# Patient Record
Sex: FEMALE | Race: WHITE | Employment: FULL TIME | URBAN - METROPOLITAN AREA
[De-identification: names, ages, dates, MRNs, and addresses within clinical notes are randomized per-mention and may not be internally consistent; named-entity substitution may affect disease eponyms.]

---

## 2017-10-04 ENCOUNTER — HOSPITAL ENCOUNTER (OUTPATIENT)
Age: 42
Setting detail: SPECIMEN
Discharge: HOME | End: 2017-10-04
Payer: COMMERCIAL

## 2017-10-04 DIAGNOSIS — R20.2 PARESTHESIA OF LEFT ARM: ICD-10-CM

## 2017-10-04 DIAGNOSIS — R20.2 PARESTHESIA OF LEFT FOOT: ICD-10-CM

## 2017-10-04 LAB
ABSOLUTE EOS #: 0.1 K/UL (ref 0–0.4)
ABSOLUTE LYMPH #: 2.5 K/UL (ref 1–4.8)
ABSOLUTE MONO #: 0.7 K/UL (ref 0.1–1.2)
BASOPHILS # BLD: 0 %
BASOPHILS ABSOLUTE: 0 K/UL (ref 0–0.2)
DIFFERENTIAL TYPE: NORMAL
EOSINOPHILS RELATIVE PERCENT: 2 %
HCT VFR BLD CALC: 41 % (ref 36–46)
HEMOGLOBIN: 13.7 G/DL (ref 12–16)
LYMPHOCYTES # BLD: 37 %
MCH RBC QN AUTO: 27.6 PG (ref 26–34)
MCHC RBC AUTO-ENTMCNC: 33.3 G/DL (ref 31–37)
MCV RBC AUTO: 82.8 FL (ref 80–100)
MONOCYTES # BLD: 11 %
PDW BLD-RTO: 14.1 % (ref 12.5–15.4)
PLATELET # BLD: 231 K/UL (ref 140–450)
PLATELET ESTIMATE: NORMAL
PMV BLD AUTO: 8.9 FL (ref 6–12)
RBC # BLD: 4.95 M/UL (ref 4–5.2)
RBC # BLD: NORMAL 10*6/UL
SEG NEUTROPHILS: 50 %
SEGMENTED NEUTROPHILS ABSOLUTE COUNT: 3.4 K/UL (ref 1.8–7.7)
THYROXINE, FREE: 1.41 NG/DL (ref 0.93–1.7)
TSH SERPL DL<=0.05 MIU/L-ACNC: 2.03 MIU/L (ref 0.3–5)
VITAMIN B-12: 554 PG/ML (ref 211–946)
WBC # BLD: 6.7 K/UL (ref 3.5–11)
WBC # BLD: NORMAL 10*3/UL

## 2017-10-12 ENCOUNTER — HOSPITAL ENCOUNTER (OUTPATIENT)
Dept: GENERAL RADIOLOGY | Facility: CLINIC | Age: 42
Discharge: HOME | End: 2017-10-12
Payer: COMMERCIAL

## 2017-10-12 DIAGNOSIS — R20.2 PARESTHESIA OF LEFT ARM: ICD-10-CM

## 2017-10-12 PROCEDURE — 72040 X-RAY EXAM NECK SPINE 2-3 VW: CPT

## 2017-11-08 ENCOUNTER — OFFICE VISIT (OUTPATIENT)
Dept: NEUROLOGY | Age: 42
End: 2017-11-08
Payer: COMMERCIAL

## 2017-11-08 VITALS
HEART RATE: 83 BPM | SYSTOLIC BLOOD PRESSURE: 125 MMHG | DIASTOLIC BLOOD PRESSURE: 83 MMHG | BODY MASS INDEX: 55.32 KG/M2 | HEIGHT: 61 IN | WEIGHT: 293 LBS

## 2017-11-08 DIAGNOSIS — M54.12 CERVICAL RADICULOPATHY AT C6: Primary | ICD-10-CM

## 2017-11-08 PROBLEM — R35.0 FREQUENCY OF URINATION: Status: ACTIVE | Noted: 2017-11-08

## 2017-11-08 PROBLEM — R39.15 URGENCY OF URINATION: Status: ACTIVE | Noted: 2017-11-08

## 2017-11-08 PROCEDURE — 99203 OFFICE O/P NEW LOW 30 MIN: CPT | Performed by: NURSE PRACTITIONER

## 2017-11-08 NOTE — PROGRESS NOTES
A1c 5.0, cholesterol 125, HDL 33, LDL 74, and triglycerides 91. Past Medical History:   Diagnosis Date    Allergic rhinitis     Asthma     GERD (gastroesophageal reflux disease)     Hypertension     Insulin resistance 9/9/2014    Iron deficiency anemia, unspecified        Past Surgical History:   Procedure Laterality Date    TONSILLECTOMY         Family History   Problem Relation Age of Onset    Cancer Mother     High Blood Pressure Mother     High Blood Pressure Father     High Blood Pressure Brother     Heart Disease Maternal Grandmother        Social History     Social History    Marital status: Single     Spouse name: N/A    Number of children: N/A    Years of education: N/A     Social History Main Topics    Smoking status: Never Smoker    Smokeless tobacco: Not on file    Alcohol use No    Drug use: Unknown    Sexual activity: Not on file     Other Topics Concern    Not on file     Social History Narrative    No narrative on file       Current Outpatient Prescriptions   Medication Sig Dispense Refill    pioglitazone (ACTOS) 45 MG tablet TAKE 1 TABLET BY MOUTH ONE TIME A DAY 30 tablet 5    lisinopril (PRINIVIL;ZESTRIL) 20 MG tablet TAKE 1 TABLET BY MOUTH ONE TIME A DAY 30 tablet 5    ranitidine (ZANTAC) 150 MG tablet Take 1 tablet by mouth 2 times daily 60 tablet 5    cetirizine (ZYRTEC) 10 MG tablet TAKE 1 TABLET BY MOUTH ONE TIME A DAY 30 tablet 5    nystatin (MYCOSTATIN) 025883 UNIT/GM powder Apply 3 times daily.  60 g 0     Current Facility-Administered Medications   Medication Dose Route Frequency Provider Last Rate Last Dose    methylPREDNISolone acetate (DEPO-MEDROL) injection 80 mg  80 mg Intramuscular Once Heather Alicea, DO           Allergies   Allergen Reactions    Dicloxacillin     Metformin And Related Diarrhea            REVIEW OF SYSTEMS    CONSTITUTIONAL Weight: absent, Appetite: absent, Fatigue: present      HEENT Ears: ringing, Eyes: glasses, Visual Examination    Neurologic Exam     Mental Status   Oriented to person, place, and time. Attention: normal.   Speech: speech is normal   Level of consciousness: alert  Normal comprehension. Cranial Nerves     CN II   Visual fields full to confrontation. CN III, IV, VI   Pupils are equal, round, and reactive to light. Extraocular motions are normal.     CN V   Facial sensation intact. CN VII   Facial expression full, symmetric. CN VIII   CN VIII normal.     CN IX, X   CN IX normal.     CN XI   CN XI normal.     CN XII   CN XII normal.     Motor Exam   Muscle bulk: normal  Overall muscle tone: normal  Right arm pronator drift: absent    Strength   Strength 5/5 throughout. Sensory Exam   Light touch normal.   Vibration normal.   Pinprick normal.   Sensory deficit distribution on right: C6    Gait, Coordination, and Reflexes     Gait  Gait: normal    Coordination   Romberg: negative  Finger to nose coordination: normal  Tandem walking coordination: normal    Tremor   Resting tremor: absent  Intention tremor: absent    Reflexes   Right brachioradialis: 2+  Left brachioradialis: 2+  Right biceps: 2+  Left biceps: 2+  Right triceps: 2+  Left triceps: 2+  Right patellar: 1+  Left patellar: 1+  Right achilles: 1+  Left achilles: 1+  Right plantar: normal  Left plantar: normal  Right ankle clonus: absent  Left ankle clonus: absent            Assessment/Plan: :    Symptoms are suggestive of a C6 cervical radiculopathy, with tingling primarily in her left arm in the first 2 fingers, as well as the left foot. Her symptoms are also associated with neck pain. There is no evidence of myelopathy. EMG/nerve conduction studies were completed showing no evidence of a mononeuropathy. Laboratory evaluations, including B12, thyroid studies, and hemoglobin A1c were negative. 1. We will obtain an MRI  Of the cervical spine without contrast  2.  Patient was also advised to try wearing wrist splints, especially at night, to relieve her symptoms  3. We discussed the use of gabapentin, but patient does not feel that her symptoms are intolerable at this time.   4. She will return in follow up in 3-4 weeks             Signed: Zaki Wu CNP

## 2017-11-25 ENCOUNTER — HOSPITAL ENCOUNTER (OUTPATIENT)
Dept: MRI IMAGING | Age: 42
Discharge: HOME | End: 2017-11-25
Payer: COMMERCIAL

## 2017-11-25 DIAGNOSIS — M54.12 CERVICAL RADICULOPATHY AT C6: ICD-10-CM

## 2017-11-25 PROCEDURE — 72141 MRI NECK SPINE W/O DYE: CPT

## 2017-11-30 ENCOUNTER — TELEPHONE (OUTPATIENT)
Dept: NEUROLOGY | Age: 42
End: 2017-11-30

## 2017-11-30 DIAGNOSIS — M48.02 CERVICAL STENOSIS OF SPINE: Primary | ICD-10-CM

## 2017-11-30 NOTE — TELEPHONE ENCOUNTER
Sharita March CNP has referred Moni to NS due to abnormal MRI C spine. Moni states she can see Dr. Baljinder Jordan. [place]. Will do a referral and fax MRI and 11/8 visit note.

## 2018-01-18 PROBLEM — M48.02 NEURAL FORAMINAL STENOSIS OF CERVICAL SPINE: Status: ACTIVE | Noted: 2018-01-18

## 2018-02-07 PROBLEM — N39.3 STRESS INCONTINENCE: Status: ACTIVE | Noted: 2018-02-07

## 2018-02-07 PROBLEM — N39.41 URGE INCONTINENCE OF URINE: Status: ACTIVE | Noted: 2018-02-07

## 2018-03-05 ENCOUNTER — TELEPHONE (OUTPATIENT)
Dept: NEUROLOGY | Age: 43
End: 2018-03-05

## 2018-09-05 ENCOUNTER — HOSPITAL ENCOUNTER (EMERGENCY)
Facility: CLINIC | Age: 43
Discharge: HOME | End: 2018-09-05
Attending: EMERGENCY MEDICINE
Payer: COMMERCIAL

## 2018-09-05 ENCOUNTER — HOSPITAL ENCOUNTER (EMERGENCY)
Facility: CLINIC | Age: 43
Discharge: HOME | End: 2018-09-05
Attending: SPECIALIST
Payer: COMMERCIAL

## 2018-09-05 ENCOUNTER — APPOINTMENT (OUTPATIENT)
Dept: GENERAL RADIOLOGY | Facility: CLINIC | Age: 43
End: 2018-09-05
Payer: COMMERCIAL

## 2018-09-05 VITALS
DIASTOLIC BLOOD PRESSURE: 80 MMHG | BODY MASS INDEX: 55.32 KG/M2 | OXYGEN SATURATION: 96 % | TEMPERATURE: 97.8 F | RESPIRATION RATE: 16 BRPM | HEIGHT: 61 IN | SYSTOLIC BLOOD PRESSURE: 135 MMHG | HEART RATE: 106 BPM | WEIGHT: 293 LBS

## 2018-09-05 VITALS
OXYGEN SATURATION: 94 % | TEMPERATURE: 97.8 F | BODY MASS INDEX: 63.68 KG/M2 | RESPIRATION RATE: 19 BRPM | WEIGHT: 293 LBS | DIASTOLIC BLOOD PRESSURE: 79 MMHG | SYSTOLIC BLOOD PRESSURE: 157 MMHG | HEART RATE: 93 BPM

## 2018-09-05 DIAGNOSIS — N39.0 URINARY TRACT INFECTION WITHOUT HEMATURIA, SITE UNSPECIFIED: ICD-10-CM

## 2018-09-05 DIAGNOSIS — S39.012A BACK STRAIN, INITIAL ENCOUNTER: Primary | ICD-10-CM

## 2018-09-05 DIAGNOSIS — M54.31 SCIATICA OF RIGHT SIDE: Primary | ICD-10-CM

## 2018-09-05 LAB
-: ABNORMAL
AMORPHOUS: ABNORMAL
BACTERIA: ABNORMAL
BILIRUBIN URINE: NEGATIVE
CASTS UA: ABNORMAL /LPF (ref 0–2)
COLOR: YELLOW
COMMENT UA: ABNORMAL
CRYSTALS, UA: ABNORMAL /HPF
EPITHELIAL CELLS UA: ABNORMAL /HPF (ref 0–5)
GLUCOSE URINE: NEGATIVE
KETONES, URINE: NEGATIVE
LEUKOCYTE ESTERASE, URINE: ABNORMAL
MUCUS: ABNORMAL
NITRITE, URINE: NEGATIVE
OTHER OBSERVATIONS UA: ABNORMAL
PH UA: 5.5 (ref 5–8)
PROTEIN UA: ABNORMAL
RBC UA: ABNORMAL /HPF (ref 0–2)
RENAL EPITHELIAL, UA: ABNORMAL /HPF
SPECIFIC GRAVITY UA: 1.01 (ref 1–1.03)
TRICHOMONAS: ABNORMAL
TURBIDITY: CLEAR
URINE HGB: ABNORMAL
UROBILINOGEN, URINE: NORMAL
WBC UA: ABNORMAL /HPF (ref 0–5)
YEAST: ABNORMAL

## 2018-09-05 PROCEDURE — 72100 X-RAY EXAM L-S SPINE 2/3 VWS: CPT

## 2018-09-05 PROCEDURE — 99282 EMERGENCY DEPT VISIT SF MDM: CPT

## 2018-09-05 PROCEDURE — 6370000000 HC RX 637 (ALT 250 FOR IP): Performed by: EMERGENCY MEDICINE

## 2018-09-05 PROCEDURE — 81001 URINALYSIS AUTO W/SCOPE: CPT

## 2018-09-05 PROCEDURE — 96372 THER/PROPH/DIAG INJ SC/IM: CPT

## 2018-09-05 PROCEDURE — 6360000002 HC RX W HCPCS: Performed by: EMERGENCY MEDICINE

## 2018-09-05 PROCEDURE — 87086 URINE CULTURE/COLONY COUNT: CPT

## 2018-09-05 PROCEDURE — 99283 EMERGENCY DEPT VISIT LOW MDM: CPT

## 2018-09-05 PROCEDURE — 6360000002 HC RX W HCPCS: Performed by: SPECIALIST

## 2018-09-05 RX ORDER — SULFAMETHOXAZOLE AND TRIMETHOPRIM 800; 160 MG/1; MG/1
1 TABLET ORAL 2 TIMES DAILY
Qty: 14 TABLET | Refills: 0 | Status: SHIPPED | OUTPATIENT
Start: 2018-09-05 | End: 2018-09-12

## 2018-09-05 RX ORDER — KETOROLAC TROMETHAMINE 30 MG/ML
30 INJECTION, SOLUTION INTRAMUSCULAR; INTRAVENOUS ONCE
Status: COMPLETED | OUTPATIENT
Start: 2018-09-05 | End: 2018-09-05

## 2018-09-05 RX ORDER — CYCLOBENZAPRINE HCL 10 MG
10 TABLET ORAL 3 TIMES DAILY PRN
Qty: 15 TABLET | Refills: 0 | Status: SHIPPED | OUTPATIENT
Start: 2018-09-05 | End: 2018-09-15

## 2018-09-05 RX ORDER — PREDNISONE 10 MG/1
TABLET ORAL
Qty: 20 TABLET | Refills: 0 | Status: SHIPPED | OUTPATIENT
Start: 2018-09-05 | End: 2018-09-10 | Stop reason: ALTCHOICE

## 2018-09-05 RX ORDER — PREDNISONE 20 MG/1
60 TABLET ORAL ONCE
Status: COMPLETED | OUTPATIENT
Start: 2018-09-05 | End: 2018-09-05

## 2018-09-05 RX ORDER — ORPHENADRINE CITRATE 30 MG/ML
60 INJECTION INTRAMUSCULAR; INTRAVENOUS ONCE
Status: COMPLETED | OUTPATIENT
Start: 2018-09-05 | End: 2018-09-05

## 2018-09-05 RX ADMIN — KETOROLAC TROMETHAMINE 30 MG: 30 INJECTION, SOLUTION INTRAMUSCULAR at 02:27

## 2018-09-05 RX ADMIN — ORPHENADRINE CITRATE 60 MG: 30 INJECTION INTRAMUSCULAR; INTRAVENOUS at 12:09

## 2018-09-05 RX ADMIN — PREDNISONE 60 MG: 20 TABLET ORAL at 02:26

## 2018-09-05 ASSESSMENT — PAIN DESCRIPTION - FREQUENCY: FREQUENCY: CONTINUOUS

## 2018-09-05 ASSESSMENT — ENCOUNTER SYMPTOMS
ABDOMINAL PAIN: 0
DIARRHEA: 0
NAUSEA: 0
VOMITING: 0
BACK PAIN: 1

## 2018-09-05 ASSESSMENT — PAIN SCALES - GENERAL
PAINLEVEL_OUTOF10: 8
PAINLEVEL_OUTOF10: 4
PAINLEVEL_OUTOF10: 6

## 2018-09-05 ASSESSMENT — PAIN DESCRIPTION - PROGRESSION: CLINICAL_PROGRESSION: GRADUALLY IMPROVING

## 2018-09-05 ASSESSMENT — PAIN DESCRIPTION - ORIENTATION: ORIENTATION: RIGHT

## 2018-09-05 ASSESSMENT — PAIN DESCRIPTION - DESCRIPTORS: DESCRIPTORS: SHARP;CONSTANT

## 2018-09-05 ASSESSMENT — PAIN DESCRIPTION - LOCATION: LOCATION: HIP

## 2018-09-05 ASSESSMENT — PAIN DESCRIPTION - PAIN TYPE: TYPE: CHRONIC PAIN

## 2018-09-05 NOTE — ED TRIAGE NOTES
Pt presents to ED with complaint of right hip pain. She was seen here last night and was given injections for pain. Pt states the \" shots made the pain worse. \"  She took Motrin 800mg this morning without relief. Pt states pain is worse after she urinates. Pt denies back pain, flank pain but points to her back when asked where pain is. Pt states \" I know this is hip pain, it is not back pain. \"  Denies fever, chills.

## 2018-09-05 NOTE — ED PROVIDER NOTES
[facility]  [address] [address]  Phone: [phone]      Pt Name: Liv Chung  MRN: [id number]  [place] 1975  Date of evaluation: 9/5/2018      CHIEF COMPLAINT       Chief Complaint   Patient presents with    Hip Pain    Back Pain         HISTORY OF PRESENT ILLNESS    Moni Guzman is a 37 y.o. female who presents   Chief Complaint   Patient presents with    Hip Pain    Back Pain   . 58-year-old female patient presents to the emergency department complaining of low back pain and right hip pain since last 3 days, worse since last night. Patient denies any radiation of pain to the lower extremities or to the abdomen. She describes pain as a pressure-like pain 6 out of 10 in intensity worse with the movements and better with rest.  She states with the movements of the pain occasionally radiates towards the left hip. She denies any tingling, numbness or weakness in the lower extremities and denies any radiation of pain to the lower extremities. She denies any bladder or bowel incontinence, fever or chills. She also denies any abdominal pain, lightheadedness or dizziness. Pain increases after sitting on the toilet for urination but denies any riya dysuria and denies any hematuria. Seen here in the early this morning and was given Toradol injection and started on prednisone for sciatica. Patient states that the sharp made the pain worse and that she returns to the emergency department. She also took Motrin this morning without any relief. Patient states she has had the pain in the past and has seen her primary care physician in the office and was advised to the weight loss. REVIEW OF SYSTEMS       Review of Systems   Constitutional: Negative for chills and fever. Cardiovascular: Negative for palpitations and leg swelling. Gastrointestinal: Negative for abdominal pain, diarrhea, nausea and vomiting.    Genitourinary: Negative for dysuria, flank pain, without hematuria, site unspecified          DISPOSITION/PLAN   DISPOSITION Decision To Discharge 09/05/2018 01:26:01 PM      Condition on Disposition    Stable    PATIENT REFERRED TO:  Desi Reeves, [address] [facility] [address]  [facility] [address] [phone]    Call in 2 days  For reevaluation of current symptoms    [facility]  [address]  [phone]    If symptoms worsen      DISCHARGE MEDICATIONS:  Discharge Medication List as of 9/5/2018  1:27 PM      START taking these medications    Details   sulfamethoxazole-trimethoprim (BACTRIM DS) 800-160 MG per tablet Take 1 tablet by mouth 2 times daily for 7 days, Disp-14 tablet, R-0Print      cyclobenzaprine (FLEXERIL) 10 MG tablet Take 1 tablet by mouth 3 times daily as needed for Muscle spasms, Disp-15 tablet, R-0Print             (Please note that portions of this note were completed with a voice recognition program.  Efforts were made to edit the dictations but occasionally words are mis-transcribed.)    Cassidy MD, F.A.C.E.P.   Attending Emergency Physician        Madeline Berrios MD  09/05/18 [id number]

## 2018-09-05 NOTE — ED NOTES
Pt. To room # 10 via wheelchair with her daughter. Pt. C/o right hip/back pain since Sunday. Pt. States she has hx of hips problems. Pt. States she has been trying motrin 800 mg without relief, last taken tonight around 0100. Pt. States she also used ice tonight. Pt. Denies injury. Pt. Alert and oriented x3. RR equal and unlabored. Pt. Does appear uncomfortable. Call light within reach.      Naomi Lino RN  09/05/18 Tricia Tavares

## 2018-09-06 LAB
CULTURE: NORMAL
Lab: NORMAL
SPECIMEN DESCRIPTION: NORMAL
STATUS: NORMAL

## 2018-09-10 ENCOUNTER — HOSPITAL ENCOUNTER (OUTPATIENT)
Age: 43
Setting detail: SPECIMEN
Discharge: HOME | End: 2018-09-10
Payer: COMMERCIAL

## 2018-09-10 DIAGNOSIS — R23.0 BLUE LIPS: ICD-10-CM

## 2018-09-10 LAB
ABSOLUTE EOS #: 0.13 K/UL (ref 0–0.44)
ABSOLUTE IMMATURE GRANULOCYTE: 0.14 K/UL (ref 0–0.3)
ABSOLUTE LYMPH #: 4.33 K/UL (ref 1.1–3.7)
ABSOLUTE MONO #: 1.35 K/UL (ref 0.1–1.2)
BASOPHILS # BLD: 0 % (ref 0–2)
BASOPHILS ABSOLUTE: 0.03 K/UL (ref 0–0.2)
D-DIMER QUANTITATIVE: 0.21 MG/L FEU
DIFFERENTIAL TYPE: ABNORMAL
EOSINOPHILS RELATIVE PERCENT: 1 % (ref 1–4)
HCT VFR BLD CALC: 47.7 % (ref 36.3–47.1)
HEMOGLOBIN: 14.9 G/DL (ref 11.9–15.1)
IMMATURE GRANULOCYTES: 1 %
LYMPHOCYTES # BLD: 29 % (ref 24–43)
MCH RBC QN AUTO: 27.3 PG (ref 25.2–33.5)
MCHC RBC AUTO-ENTMCNC: 31.2 G/DL (ref 28.4–34.8)
MCV RBC AUTO: 87.5 FL (ref 82.6–102.9)
MONOCYTES # BLD: 9 % (ref 3–12)
NRBC AUTOMATED: 0 PER 100 WBC
PDW BLD-RTO: 13.6 % (ref 11.8–14.4)
PLATELET # BLD: 322 K/UL (ref 138–453)
PLATELET ESTIMATE: ABNORMAL
PMV BLD AUTO: 10.5 FL (ref 8.1–13.5)
RBC # BLD: 5.45 M/UL (ref 3.95–5.11)
RBC # BLD: ABNORMAL 10*6/UL
SEG NEUTROPHILS: 60 % (ref 36–65)
SEGMENTED NEUTROPHILS ABSOLUTE COUNT: 8.87 K/UL (ref 1.5–8.1)
WBC # BLD: 14.9 K/UL (ref 3.5–11.3)
WBC # BLD: ABNORMAL 10*3/UL

## 2018-09-11 ENCOUNTER — HOSPITAL ENCOUNTER (OUTPATIENT)
Dept: GENERAL RADIOLOGY | Facility: CLINIC | Age: 43
Discharge: HOME | End: 2018-09-13
Payer: COMMERCIAL

## 2018-09-11 DIAGNOSIS — D72.829 LEUKOCYTOSIS, UNSPECIFIED TYPE: ICD-10-CM

## 2018-09-11 PROCEDURE — 71046 X-RAY EXAM CHEST 2 VIEWS: CPT

## 2018-09-29 ENCOUNTER — HOSPITAL ENCOUNTER (EMERGENCY)
Facility: CLINIC | Age: 43
Discharge: HOME | End: 2018-09-29
Attending: EMERGENCY MEDICINE
Payer: COMMERCIAL

## 2018-09-29 VITALS
HEIGHT: 61 IN | HEART RATE: 101 BPM | RESPIRATION RATE: 20 BRPM | BODY MASS INDEX: 55.32 KG/M2 | DIASTOLIC BLOOD PRESSURE: 79 MMHG | WEIGHT: 293 LBS | OXYGEN SATURATION: 99 % | TEMPERATURE: 98.1 F | SYSTOLIC BLOOD PRESSURE: 115 MMHG

## 2018-09-29 DIAGNOSIS — L91.8 SKIN TAG: Primary | ICD-10-CM

## 2018-09-29 PROCEDURE — 99282 EMERGENCY DEPT VISIT SF MDM: CPT

## 2018-09-29 NOTE — ED PROVIDER NOTES
neurologic deficits   Skin: Warm and dry     Physical Exam  DIFFERENTIAL DIAGNOSIS/ MDM:     Nonbleeding skin tag. Follow up with dermatology. DIAGNOSTIC RESULTS     EKG: All EKG's are interpreted by the Emergency Department Physician who either signs or Co-signs this chart in the absence of a cardiologist.        Not indicated unless otherwise documented above    LABS:  No results found for this visit on 09/29/18. Not indicated unless otherwise documented above    RADIOLOGY:   I reviewed the radiologist interpretations:    No orders to display       Not indicated unless otherwise documented above    EMERGENCY DEPARTMENT COURSE:     The patient was given the following medications:  No orders of the defined types were placed in this encounter. Vitals:    Vitals:    09/29/18 1327 09/29/18 1338   BP: (!) 129/93 115/79   Pulse: 101    Resp: 20    Temp: 98.1 °F (36.7 °C)    TempSrc: Oral    SpO2: 99%    Weight: (!) 150.6 kg (332 lb)    Height: 5' 1\" (1.549 m)      -------------------------  /79   Pulse 101   Temp 98.1 °F (36.7 °C) (Oral)   Resp 20   Ht 5' 1\" (1.549 m)   Wt (!) 150.6 kg (332 lb)   SpO2 99%   BMI 62.73 kg/m²         I have reviewed the disposition diagnosis with the patient and or their family/guardian. I have answered their questions and given discharge instructions. They voiced understanding of these instructions and did not have any further questions or complaints. CRITICAL CARE:    None    CONSULTS:    None    PROCEDURES:    None      OARRS Report if indicated             FINAL IMPRESSION      1.  Skin tag          DISPOSITION/PLAN   DISPOSITION Decision To Discharge 09/29/2018 01:33:33 PM        PATIENT REFERRED TO:  J Luis Maya MD  [facility] [address], [address] [address] [phone]    Call in 2 days        DISCHARGE MEDICATIONS:  Discharge Medication List as of 9/29/2018  1:34 PM          (Please note that portions of this note were completed with a voice recognition program.  Efforts were made to edit the dictations but occasionally words are mis-transcribed.)    Monet Watson,   Attending Emergency Physician       Monet Watson DO  09/29/18 [id number]

## 2018-09-29 NOTE — LETTER
[facility]  [address] [facility] [address] [place] [address] [address] [address]  Phone: [phone]               September 29, 2018    Patient: Midlred Fritz   YOB: 1975   Date of Visit: 9/29/2018       To Whom It May Concern:    Rozell Closs was seen and treated in our emergency department on 9/29/2018. her daughter may return to work on 09/30/2018.       Sincerely,       Robi Ziegler RN         Signature:__________________________________

## 2019-01-14 PROBLEM — R39.15 URGENCY OF URINATION: Status: RESOLVED | Noted: 2017-11-08 | Resolved: 2019-01-14

## 2019-01-14 PROBLEM — E66.813 CLASS 3 SEVERE OBESITY DUE TO EXCESS CALORIES WITH SERIOUS COMORBIDITY AND BODY MASS INDEX (BMI) OF 60.0 TO 69.9 IN ADULT: Status: ACTIVE | Noted: 2018-01-18

## 2019-01-14 PROBLEM — R35.0 FREQUENCY OF URINATION: Status: RESOLVED | Noted: 2017-11-08 | Resolved: 2019-01-14

## 2019-01-14 PROBLEM — E66.01 CLASS 3 SEVERE OBESITY DUE TO EXCESS CALORIES WITH SERIOUS COMORBIDITY AND BODY MASS INDEX (BMI) OF 60.0 TO 69.9 IN ADULT (HCC): Status: ACTIVE | Noted: 2018-01-18

## 2019-07-15 ENCOUNTER — HOSPITAL ENCOUNTER (OUTPATIENT)
Age: 44
Setting detail: SPECIMEN
Discharge: HOME | End: 2019-07-15
Payer: COMMERCIAL

## 2019-07-15 DIAGNOSIS — E66.01 CLASS 3 SEVERE OBESITY DUE TO EXCESS CALORIES WITH SERIOUS COMORBIDITY AND BODY MASS INDEX (BMI) OF 60.0 TO 69.9 IN ADULT (HCC): ICD-10-CM

## 2019-07-15 DIAGNOSIS — Z13.220 LIPID SCREENING: ICD-10-CM

## 2019-07-15 DIAGNOSIS — I10 ESSENTIAL HYPERTENSION, BENIGN: ICD-10-CM

## 2019-07-15 DIAGNOSIS — E88.81 INSULIN RESISTANCE: ICD-10-CM

## 2019-07-15 PROBLEM — J45.20 MILD INTERMITTENT ASTHMA WITHOUT COMPLICATION: Status: ACTIVE | Noted: 2019-07-15

## 2019-07-15 LAB
ALBUMIN SERPL-MCNC: 4.5 G/DL (ref 3.5–5.2)
ALBUMIN/GLOBULIN RATIO: 1.4 (ref 1–2.5)
ALP BLD-CCNC: 54 U/L (ref 35–104)
ALT SERPL-CCNC: 11 U/L (ref 5–33)
ANION GAP SERPL CALCULATED.3IONS-SCNC: 20 MMOL/L (ref 9–17)
AST SERPL-CCNC: 10 U/L
BILIRUB SERPL-MCNC: 0.36 MG/DL (ref 0.3–1.2)
BUN BLDV-MCNC: 20 MG/DL (ref 6–20)
BUN/CREAT BLD: ABNORMAL (ref 9–20)
CALCIUM SERPL-MCNC: 9.6 MG/DL (ref 8.6–10.4)
CHLORIDE BLD-SCNC: 102 MMOL/L (ref 98–107)
CHOLESTEROL, FASTING: 143 MG/DL
CHOLESTEROL/HDL RATIO: 3.4
CO2: 19 MMOL/L (ref 20–31)
CREAT SERPL-MCNC: 0.73 MG/DL (ref 0.5–0.9)
GFR AFRICAN AMERICAN: >60 ML/MIN
GFR NON-AFRICAN AMERICAN: >60 ML/MIN
GFR SERPL CREATININE-BSD FRML MDRD: ABNORMAL ML/MIN/{1.73_M2}
GFR SERPL CREATININE-BSD FRML MDRD: ABNORMAL ML/MIN/{1.73_M2}
GLUCOSE FASTING: 95 MG/DL (ref 70–99)
HDLC SERPL-MCNC: 42 MG/DL
INSULIN COMMENT: NORMAL
INSULIN REFERENCE RANGE:: NORMAL
INSULIN: 28 MU/L
LDL CHOLESTEROL: 65 MG/DL (ref 0–130)
POTASSIUM SERPL-SCNC: 4.9 MMOL/L (ref 3.7–5.3)
SODIUM BLD-SCNC: 141 MMOL/L (ref 135–144)
TOTAL PROTEIN: 7.8 G/DL (ref 6.4–8.3)
TRIGLYCERIDE, FASTING: 179 MG/DL
TSH SERPL DL<=0.05 MIU/L-ACNC: 2.19 MIU/L (ref 0.3–5)
VLDLC SERPL CALC-MCNC: ABNORMAL MG/DL (ref 1–30)

## 2019-10-09 ENCOUNTER — HOSPITAL ENCOUNTER (OUTPATIENT)
Dept: OCCUPATIONAL THERAPY | Age: 44
Setting detail: THERAPIES SERIES
Discharge: HOME | End: 2019-10-09
Payer: COMMERCIAL

## 2019-10-09 PROCEDURE — 97535 SELF CARE MNGMENT TRAINING: CPT

## 2019-10-09 PROCEDURE — 97167 OT EVAL HIGH COMPLEX 60 MIN: CPT

## 2019-10-28 ENCOUNTER — HOSPITAL ENCOUNTER (OUTPATIENT)
Dept: OCCUPATIONAL THERAPY | Age: 44
Setting detail: THERAPIES SERIES
Discharge: HOME | End: 2019-10-28
Payer: COMMERCIAL

## 2019-10-28 PROCEDURE — 97110 THERAPEUTIC EXERCISES: CPT

## 2019-10-28 PROCEDURE — 97535 SELF CARE MNGMENT TRAINING: CPT

## 2019-11-04 ENCOUNTER — HOSPITAL ENCOUNTER (OUTPATIENT)
Dept: OCCUPATIONAL THERAPY | Age: 44
Setting detail: THERAPIES SERIES
Discharge: HOME | End: 2019-11-04
Payer: COMMERCIAL

## 2019-11-04 PROCEDURE — 97535 SELF CARE MNGMENT TRAINING: CPT

## 2019-11-04 PROCEDURE — 97140 MANUAL THERAPY 1/> REGIONS: CPT

## 2019-11-04 PROCEDURE — 97110 THERAPEUTIC EXERCISES: CPT

## 2019-11-11 ENCOUNTER — HOSPITAL ENCOUNTER (OUTPATIENT)
Dept: OCCUPATIONAL THERAPY | Age: 44
Setting detail: THERAPIES SERIES
Discharge: HOME | End: 2019-11-11
Payer: COMMERCIAL

## 2019-11-11 PROCEDURE — 97535 SELF CARE MNGMENT TRAINING: CPT

## 2019-11-11 PROCEDURE — 97110 THERAPEUTIC EXERCISES: CPT

## 2019-11-18 ENCOUNTER — HOSPITAL ENCOUNTER (OUTPATIENT)
Dept: OCCUPATIONAL THERAPY | Age: 44
Setting detail: THERAPIES SERIES
Discharge: HOME | End: 2019-11-18
Payer: COMMERCIAL

## 2019-11-18 PROCEDURE — 97535 SELF CARE MNGMENT TRAINING: CPT

## 2019-11-18 PROCEDURE — 97530 THERAPEUTIC ACTIVITIES: CPT

## 2019-11-18 PROCEDURE — 97110 THERAPEUTIC EXERCISES: CPT

## 2019-11-27 ENCOUNTER — HOSPITAL ENCOUNTER (OUTPATIENT)
Dept: OCCUPATIONAL THERAPY | Age: 44
Setting detail: THERAPIES SERIES
Discharge: HOME | End: 2019-11-27
Payer: COMMERCIAL

## 2019-11-27 PROCEDURE — 97535 SELF CARE MNGMENT TRAINING: CPT

## 2019-12-02 ENCOUNTER — HOSPITAL ENCOUNTER (OUTPATIENT)
Dept: OCCUPATIONAL THERAPY | Age: 44
Setting detail: THERAPIES SERIES
Discharge: HOME | End: 2019-12-02
Payer: COMMERCIAL

## 2019-12-02 PROCEDURE — 97110 THERAPEUTIC EXERCISES: CPT

## 2019-12-02 PROCEDURE — 97535 SELF CARE MNGMENT TRAINING: CPT

## 2020-01-06 ENCOUNTER — HOSPITAL ENCOUNTER (OUTPATIENT)
Dept: OCCUPATIONAL THERAPY | Age: 45
Setting detail: THERAPIES SERIES
Discharge: HOME | End: 2020-01-06
Payer: COMMERCIAL

## 2020-01-06 PROCEDURE — 97140 MANUAL THERAPY 1/> REGIONS: CPT

## 2020-01-06 PROCEDURE — 97535 SELF CARE MNGMENT TRAINING: CPT

## 2020-01-06 NOTE — FLOWSHEET NOTE
[facility]  Lymphedema Services  Treatment Note      Date:  2020  Patient: Yin Rodriguez  : 1975             MRN: [id number]  Referring Physician: Rosana Henao DO Phone Number: [phone]  Fax Number: [phone]  Insurance: [facility]  Medical Diagnosis: Lymphedema of the panis     Rehab Codes: I89.0  Onset Date: Pt states approx 3 weeks ago she started getting pain in the lower abdomen and it would start to grow and become very uncomfortable. Next 's appt. : 10-30-19    Visit number: 6    Patient is a 53year old []male [x]female referred to the Lymphedema Clinic with a diagnosis of  Lymphedema of the panis. Pt states since her infection cleared she is noticing a small decrease in size on each of the sides of her abdomen areas ( Obliques). Pt states she has not noticed much of a change anywhere else in that she continues to have edema. Pt states that she did purchase the compression andrea pants that she starts  of which she is finding is somewhat helpful in that she is notices she is going to the bathroom more. She does feel that she ordered a size to big of which she states when she gets more money she will plan on ordering another pair. 19: Pt arrives to today's treatment with compression pants in place. She states she continues to wear the andrea pants daily. She states she continues to take the lasix and has gained 2 pounds back. She states that she also continues to do them 2-4 x a day through out the week which she reports will continue to have to go to the bathroom after completing them. Pt states that she trail the biocompression pump her legs felt like Jelly while they were here, however, through out the day the continued to feel fine. Pt is agreeable to trail the Flexitouch pump at todays session to compare it to the [facility]. OT explains the difference of the devices, and how the [facility] ([facility]) would function.  Pt is educated on how to don the device with the rep present and trails at normal pressure for 30 minutes to compelte a full rotation of the abdomen/ panis and R LE. Upon completion pt states that she felt the R LE/ side of the abdomen is now softer than prior use. During use of the pump, pt is educated  On a wearing schedule of which she states she is agreeable to. Once pump is completed, pt is educated on various types of products to be fitted for at her following visit. Pt is educated on the difference of Jobst, Juzo and Medi flat knits as well as [employer] Tribute Night, shorts. Pt is agreeable to schedule 2 additional visits to be fitted for the garments and then have info sent to [facility] for insurance coverage. 11/27/19: Pt arrives to today's treatment with compression andrea in place. Pt reports that a small scab/ wound has started at her waist line which she believes from these pants. OT encourages pt to keep pulling the pants up higher to prevent this from occuring, pt states she is already attempting to do this. Pt states that she continues to complete her exercises daily and she is down another 3 pounds, for a total of 13 pounds. Pt is agreeable to move forward with being fitted for the compression garment she will be fitted for today. Measurements were taken today for Tribute Night Device as well as Jobst [employer] pants. Pt would benefit from having both of these products to reduce the size of her panis, allow for a better quality of life and reduce the number of infections she has had occuring in this area. Pt is also agreeable to order a panis swell spot to assist with genitale edema that has also accumulated. Orders will be sent to [facility] for processing today. Panis specific measurements are taken and recorded below. A reduction in size is noted. Pt last follows up on her pump order of which she states that she has been in contact with [facility] to move forward with the order. 12/2/19: Pt states that she continues to wear the andrea pants over the weekend and she also continued to complete her exercises 2-4/ day. Pt states that she has noticed she is now continuing to urinate more through out the night without the use of the lasix. Pt states over the holiday weekend she did gain 3 pounds from the family parties of mashed potato's and turkey. Pt states that she still had not heard from [facility], however she did get her pump on Wednesday. Pt states that the rep is coming out Friday at 9 am to train her on how to use the pump and she also contacted [employer] In[employer] letting them know she would not like to move forward with their pump. Pt is also provided with the [facility] phone number to follow up on. Pt then reviews MLD/deep breathing techniques that they were educated on at the previous session. Pt compelted 5-10 reps with  ( min ) vc's required for correct techniques. Pt states she has no other questions as this time. Pt next is provided with the full body lymph  out of which she also demonstrates good understanding by completing 5 reps of each exercise. Pt states that she has no other questions at this time. 1/6/2019:  Pt arrives to today's treatment without her compression ( Jobst [employer])  on stating that she received these on Atkins Eve, instead she has the [employer] compression pant that she has been wearing daily . She states that she has tried on the device of which she really enjoyed having it on, however, it did not stay up and continuously fell down. OT provided pt with the information to proceed with getting this fixed for the vendor of which she is agreeable to contact. Pt also has received the tribute night which she brought with her today but states she is not able to get it on by herself. OT assist pt is trailing the device to ensure proper fit. The devices fits properly and OT is able to adjust the device using the velcro straps.  Pt is reminded on a wearing schedule of which she states she is agreeable to trail. She states she has no other questions about it at this time - Pt last states that she has received the vaspneumatic pump that she states she has been using daily on her legs only. Pt states she has not been wearing the torso portion because her pump is not set up for that. OT provided the pt with vendor info to contact them and have the pump set up properly as this is the primary area she will need the massage and compression. Pt is agreeable. OT also sent out e-mail to vendor. Prior to leaving pt states that she is feeling good about the all techniques she has learned. She states that she did get down to 340 pounds from 356 pounds of which she is pleased about. She also states that she is getting surgery on her neck in March and inquires on what to do during this time. OT educates her that she will need her dtr help to get the compression devices on to prevent any pulling on the upper shoulders/ neck area. Pt is agreeable and also states she would like to be put on a 60 day hold to ensure she is able to figure everything out before being discharged. Diagnosis ICD 10 Code:  [x] I89.0   Secondary lymphedema, not otherwise classified    Allergies:  [] None       [] Latex       [] Adhesive tape       [x] Other: Medications     Pain:  [x] No    [] Yes   - Pt states she has had no pain.    Location:  Pain Rating: ( 0-10 scale) :   Pain Description:  [] Achy,Worse at end of day  [] Distention, Discomfort  [] Painful with palpation  [] Guarding, Tingling, Numbness      [] Hypersensitivity    [] Radiation fibrosis    Interruption of Treatment [] Yes  [] No    Medications:  [x]See charted information in EPIC    Past Medical History:  [x] See charted information in EPIC    Restrictions/Precautions:   Fall Risk   [x] No    [] Yes        If yes, intervention:     Self MLD suspended d/t:  [] Acute cellulitis  [] recent thrombosis  [] inflammation w/ infection [] untreated CHF []Other:    Conservative Treatments Utilized in the Past:  [x] None     [] Compression Garments      [] Bandaging [] Elevation  [] Exercise    []Self MLD       [] Other         Frequency/Duration:    Current Lymphedmea Treatments:   [x] None     [] Compression Garments    [] Bandaging [] Elevation  [] Exercise    []Self MLD       [] Other            Lymphedema Contraindication Assessment:  [x] None      [] CHF primary cause of swelling- Monitor weight loss - (no more than 5 pounds in 1 day)    [] Hyperthyroidism-No MLD to neck   []DVT-acute, No MLD to limb       [] Pelvic DVT-MLD lifetime contraindication    [] Kidney Dysfunction     [] Prenancy   [] Advanced Renal Dz-No compression bandaging  [] Age 60+-No MLD to neck         [] GFF-No abdominal MLD     [] Limb paralysis-Precaution with bandaging d/t no sensory feedback  [] Cardiac edema/Heart disease-absolute contraindication-No pneumo-massage or bandaging  [] Inflammatory intestine (Chrohns, diverticulitis, ulcerative colitis) -No abdominal MLD  [] Cellulitis (warm, tender/pain, swelling, rash, fever, less defined erythematous borders) - No MLD until 72 hours of antibiotics   [] Erysipelas (fiery/red, swollen/shiny, raised defined borders). Fever, chills, shivering - No MLD until 72 hours of antibiotics        History of Cancer:  []Yes [x]No    Came to Clinic  [] Bandaged   R   L   []Unbandaged   R   L    [] With Stocking    R   L     [] With Sleeve    R   L    [] Unna Boot   R   L    [x]With alternative compression:  [employer] SOHA PANTS WITH 20% SPANDEX IN PLACE.   [] Kinesiotaped:    [] R Hand [] R Arm   [] R Leg  [] R Breast  [] R Upper back      [] L Hand        [] L Arm   [] L Leg   [] L Breast   [] L Upper back     [] Skin Sensitivity      Skin Integrity/Appearance: location   [] Normal [x] Dry   []Moist/weeping/blisters     [] Warmth/mild inflammation  [] Congestive Dermatitis  [] Brawny/hardened   [] Strafford hump-dorsum   [] Rash    [] Calor, Warm, Red, hot  [] Chronic erythema  [] Crusted/bumpy       [x] Fatty lobules            [] Loose, lobular          []  Soft, doughy  [x] Hyperkeratosis        [] Hyperplasia             [] Hyperpigmentation/hemosiderin staining/gaitor distribution    [] Skin breakdown with lymphorrhea             [] Recent cellulitis     [] Cording (BYRON)  [] Fibrosis        [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        [] Elephantiasis           [] Peau d' orange skin change   [] Firm edema  [] Pitting edema (1 min + to refill)  [] Non-pitting edema (refills < 1 min)  Other Symptoms:  [] Symptom Onset:    [x] Slow            [] Rapid     [] Acute  [x] Truncal/abdominal swelling [] Chest/axillary swelling   [] Genital swelling  [] Fungal rash in skin folds [] Scapular swelling     [] Impaired ROM       [] Impaired mobility           [] Other:  [] Stemmers sign:     [x] Absent         [] Positive  [] Scars:                    [] Thick/rigid   [] Raised    [] Hypertrophic/contracted    [] Flattened     [] Softened  [] Mobility of scar:     [] Poor            [] Fair        [] Good           [] Normal         Wounds Location:   [x] None  [] Being addressed by Sarath Odell  [] Shallow, painful venous   [] denudement (top layer removed by moisture)                   [] Erythema      [] Maceration (soft from wetness but skin not broken)   [] Exudate (fluids expelled)         [] Purulent   [] Radiation burns/ulcers     [] Superficial abrasions  [] Excoriation (compulsive picking) [] Eschar (hard crust/scab)                Color: location  [x] Normal [] Mottled [] Flushed/erythema   [] Other    Pitting Edema:   Edema Location: Abdomen  [] 1+ Edema [x] 2+ Edema  [] 3+ Edema [] 4+ Edema       Aggravating factors:  [] Activity  [] Stress  [x] Sleep Position, - on L side specifically [] Travel [] Hot Temperatures [] Diet   [x]  Other- Sitting,   Relieving factors:       [] Activity  [] Compression  [] Rest  [] Cold Temperatures [] Diet   [x]  Other:laying down    Response to elevation: [] Persists   [] Reduces   [x] Unaffected    Education Included:  [x] General Lymphedema Information  [x] Dos and Donts [x] Self MLD [] Self Bandaging  [] Kinesiotaping      Pt received education on the following healthy behaviors:   [x] Nutrition and Compression garments   [] Home Exercises   [] Hygiene  [] Skin/nail care   [x] NLN Risk Reduction Practice handout    Learner: [x] Patient [] Spouse  []Other [] Family  Method: [x] Verbal [x] Demonstration [x] Handouts [] Exercise booklet  Response:  [x] Verbalized understanding [x] Demonstrated understanding      [] Needs assist            [] No understanding    Physical Status:  Range of motion: Deficits [] Yes [x] No       Comment:  Strength:       Deficits [] Yes [x] No        Comment:  Sensation:       Deficits [] Yes [x] No        Comment:  Transfer:       Deficits [] Yes [x] No        Comment:  Ambulation:       Deficits [] Yes [x] No        Comment:  Functional Status:  Self Care:   [x] Independent [] Needs Assist [] Dependent   Home Maintenance:    [x] Independent [] Needs Assist [] Dependent  Fatigue:   [] None identified [x] Minimum  [] Moderate [] Significant      Suggested Professional Referral:  [] Dietician      [x] Nutritionist     [] Physical Therapy   [] Counseling   [] Wound Care   [] Endovenous Surgeon  [] Podiatrist    Measurements Lower Extremity  Measurements in 10 cm increments at areas noted. They are circumferential.   Measurements in Centimeters Right Left   MTS          Dorsum    24.0 24.0   Inframalleolar       33.0 32.4   Supramalleolar     26.0 25.0   10 41.5 37.0   20 51.0 48.7   30 46.0 45.5   10 cm above knee 79.0 78.0   Abdomen 138cm -   Upper Panis 171cm -   Mid Panis 156 cm -   Lower Panis 144cm -   Total 300.5 290.6     ** PANIS MEASUREMENTS ARE NOT ADDED INTO TOTALS.       Assessment  Knowledge of home program:  [] Good [x] Fair  [] Poor  Family can assist (99008)-pt educ re: self MLD for home program, self bandaging, do's/don'ts, activity guidelines 15    Other:      Total Treatment Time    68        Time In:  7:00 am  Time Out: 8:08 am      Electronically signed by Ziyad Estes OT on 1/6/2020 at 7:07 AM

## 2020-02-06 ENCOUNTER — OFFICE VISIT (OUTPATIENT)
Dept: BARIATRICS/WEIGHT MGMT | Age: 45
End: 2020-02-06
Payer: COMMERCIAL

## 2020-02-06 VITALS
HEIGHT: 61 IN | RESPIRATION RATE: 20 BRPM | HEART RATE: 86 BPM | WEIGHT: 293 LBS | SYSTOLIC BLOOD PRESSURE: 124 MMHG | DIASTOLIC BLOOD PRESSURE: 82 MMHG | BODY MASS INDEX: 55.32 KG/M2

## 2020-02-06 PROBLEM — R73.03 PREDIABETES: Status: ACTIVE | Noted: 2020-02-06

## 2020-02-06 PROBLEM — I89.0 LYMPHEDEMA: Status: ACTIVE | Noted: 2020-02-06

## 2020-02-06 PROCEDURE — 99204 OFFICE O/P NEW MOD 45 MIN: CPT | Performed by: NURSE PRACTITIONER

## 2020-02-06 NOTE — PROGRESS NOTES
Pressure Brother     Thyroid Cancer Brother     Cancer Brother     Heart Disease Maternal Grandmother     Depression Other     High Blood Pressure Other        Social History:  Social History     Socioeconomic History    Marital status: Single     Spouse name: Not on file    Number of children: Not on file    Years of education: Not on file    Highest education level: Not on file   Occupational History    Not on file   Social Needs    Financial resource strain: Not on file    Food insecurity:     Worry: Not on file     Inability: Not on file    Transportation needs:     Medical: Not on file     Non-medical: Not on file   Tobacco Use    Smoking status: Never Smoker    Smokeless tobacco: Never Used   Substance and Sexual Activity    Alcohol use: No    Drug use: No    Sexual activity: Yes     Partners: Male   Lifestyle    Physical activity:     Days per week: Not on file     Minutes per session: Not on file    Stress: Not on file   Relationships    Social connections:     Talks on phone: Not on file     Gets together: Not on file     Attends [employer]al service: Not on file     Active member of club or organization: Not on file     Attends meetings of clubs or organizations: Not on file     Relationship status: Not on file    Intimate partner violence:     Fear of current or ex partner: Not on file     Emotionally abused: Not on file     Physically abused: Not on file     Forced sexual activity: Not on file   Other Topics Concern    Not on file   Social History Narrative    Not on file       Current Medications:  Current Outpatient Medications   Medication Sig Dispense Refill    lisinopril (PRINIVIL;ZESTRIL) 20 MG tablet TAKE 1 TABLET BY MOUTH ONE TIME A DAY 90 tablet 1    cetirizine (ZYRTEC) 10 MG tablet TAKE 1 TABLET BY MOUTH ONE TIME A DAY 90 tablet 1    montelukast (SINGULAIR) 10 MG tablet Take 1 tablet by mouth daily 90 tablet 1    pioglitazone (ACTOS) 15 MG tablet TAKE 1 TABLET BY MOUTH ONE TIME A DAY 90 tablet 1    furosemide (LASIX) 20 MG tablet Take 1 tablet by mouth 2 times daily as needed (edema) 180 tablet 1    famotidine (PEPCID) 40 MG tablet Take 1 tablet by mouth 2 times daily 180 tablet 1    albuterol sulfate  (90 Base) MCG/ACT inhaler Inhale 2 puffs into the lungs 4 times daily as needed for Wheezing 1 Inhaler 0    ibuprofen (ADVIL;MOTRIN) 800 MG tablet Take 1 tablet by mouth every 8 hours as needed for Pain 90 tablet 1    nystatin (MYCOSTATIN) 151867 UNIT/GM powder APPLY EXTERNALLY THREE TIMES A DAY  60 g 0     Current Facility-Administered Medications   Medication Dose Route Frequency Provider Last Rate Last Dose    methylPREDNISolone acetate (DEPO-MEDROL) injection 80 mg  80 mg Intramuscular Once Soco Lapping, DO           Vital Signs:  /82 (Site: Right Upper Arm, Position: Sitting, Cuff Size: Large Adult)   Pulse 86   Resp 20   Ht 5' 0.98\" (1.549 m)   Wt (!) 340 lb (154.2 kg)   BMI 64.28 kg/m²     BMI/Height/Weight:  Body mass index is 64.28 kg/m². Waist Circumference 69\"          Review of Systems - A review of systems was performed. All was negative unless otherwise documented in HPI. Constitutional: Negative for fever, chills and diaphoresis. HENT: Negative for hearing loss and trouble swallowing. Eyes: Negative for photophobia and visual disturbance. Respiratory: Negative for cough, shortness of breath and wheezing. Cardiovascular: Negative for chest pain and palpitations. Gastrointestinal: Negative for nausea, vomiting, abdominal pain, diarrhea, constipation, blood in stool and abdominal distention. Endocrine: Negative for polydipsia, polyphagia and polyuria. Genitourinary: Negative for dysuria, frequency, hematuria and difficulty urinating. Musculoskeletal: Negative for myalgias, joint swelling. Skin: Negative for pallor and rash. Neurological: Negative for dizziness, tremors, light-headedness and headaches. Auto Differential    Comprehensive Metabolic Panel    Hemoglobin A1C    Lipid Panel    Uric Acid    EKG 12 Lead   9. Prediabetes  CBC Auto Differential    Comprehensive Metabolic Panel    Hemoglobin A1C    Lipid Panel    Uric Acid    EKG 12 Lead   10. Lymphedema  CBC Auto Differential    Comprehensive Metabolic Panel    Hemoglobin A1C    Lipid Panel    Uric Acid    EKG 12 Lead       Plan:      Class schedule reviewed. Consent and confidentiality agreement discussed. Patient aware group medical appointment is voluntary and individual appointments are available as desired. [x] EKG ordered. [x] Baseline lab work ordered. [] Diabetic teaching done. Low blood sugar protocol reviewed with pt.      [] Discussed targets and management of blood sugar, Hgb A1C, lipids, and blood pressure. [x] Reviewed medications and discussed potential need for adjustments while following the program and losing weight.     [] Smoking cessation discussed. [] Avoidance of alcohol discussed. [x] Specific questions answered. Follow up:  Return in about 12 days (around 2/18/2020).     This encounters orders:  Orders Placed This Encounter   Procedures    CBC Auto Differential     Standing Status:   Future     Standing Expiration Date:   2/5/2021    Comprehensive Metabolic Panel     Standing Status:   Future     Standing Expiration Date:   2/5/2021    Hemoglobin A1C     Standing Status:   Future     Standing Expiration Date:   2/5/2021    Lipid Panel     Standing Status:   Future     Standing Expiration Date:   2/5/2021     Order Specific Question:   Is Patient Fasting?/# of Hours     Answer:   12    Uric Acid     Standing Status:   Future     Standing Expiration Date:   2/5/2021    EKG 12 Lead     Standing Status:   Future     Standing Expiration Date:   2/6/2021     Order Specific Question:   Reason for Exam?     Answer:   Pre-op       This encounters prescriptions:  No orders of the defined types were placed in

## 2020-02-14 ENCOUNTER — HOSPITAL ENCOUNTER (OUTPATIENT)
Age: 45
Discharge: HOME | End: 2020-02-14
Payer: COMMERCIAL

## 2020-02-14 LAB
ABSOLUTE EOS #: 0.22 K/UL (ref 0–0.44)
ABSOLUTE IMMATURE GRANULOCYTE: 0.03 K/UL (ref 0–0.3)
ABSOLUTE LYMPH #: 2.59 K/UL (ref 1.1–3.7)
ABSOLUTE MONO #: 0.86 K/UL (ref 0.1–1.2)
ALBUMIN SERPL-MCNC: 4.2 G/DL (ref 3.5–5.2)
ALBUMIN/GLOBULIN RATIO: 1.4 (ref 1–2.5)
ALP BLD-CCNC: 54 U/L (ref 35–104)
ALT SERPL-CCNC: 9 U/L (ref 5–33)
ANION GAP SERPL CALCULATED.3IONS-SCNC: 11 MMOL/L (ref 9–17)
AST SERPL-CCNC: 11 U/L
BASOPHILS # BLD: 0 % (ref 0–2)
BASOPHILS ABSOLUTE: 0.03 K/UL (ref 0–0.2)
BILIRUB SERPL-MCNC: 0.29 MG/DL (ref 0.3–1.2)
BUN BLDV-MCNC: 25 MG/DL (ref 6–20)
BUN/CREAT BLD: ABNORMAL (ref 9–20)
CALCIUM SERPL-MCNC: 9.4 MG/DL (ref 8.6–10.4)
CHLORIDE BLD-SCNC: 102 MMOL/L (ref 98–107)
CHOLESTEROL/HDL RATIO: 4.3
CHOLESTEROL: 143 MG/DL
CO2: 25 MMOL/L (ref 20–31)
CREAT SERPL-MCNC: 0.88 MG/DL (ref 0.5–0.9)
DIFFERENTIAL TYPE: NORMAL
EOSINOPHILS RELATIVE PERCENT: 3 % (ref 1–4)
ESTIMATED AVERAGE GLUCOSE: 100 MG/DL
GFR AFRICAN AMERICAN: >60 ML/MIN
GFR NON-AFRICAN AMERICAN: >60 ML/MIN
GFR SERPL CREATININE-BSD FRML MDRD: ABNORMAL ML/MIN/{1.73_M2}
GFR SERPL CREATININE-BSD FRML MDRD: ABNORMAL ML/MIN/{1.73_M2}
GLUCOSE BLD-MCNC: 99 MG/DL (ref 70–99)
HBA1C MFR BLD: 5.1 % (ref 4–6)
HCT VFR BLD CALC: 42 % (ref 36.3–47.1)
HDLC SERPL-MCNC: 33 MG/DL
HEMOGLOBIN: 13.2 G/DL (ref 11.9–15.1)
IMMATURE GRANULOCYTES: 0 %
LDL CHOLESTEROL: 80 MG/DL (ref 0–130)
LYMPHOCYTES # BLD: 35 % (ref 24–43)
MCH RBC QN AUTO: 27.7 PG (ref 25.2–33.5)
MCHC RBC AUTO-ENTMCNC: 31.4 G/DL (ref 28.4–34.8)
MCV RBC AUTO: 88.1 FL (ref 82.6–102.9)
MONOCYTES # BLD: 12 % (ref 3–12)
NRBC AUTOMATED: 0 PER 100 WBC
PDW BLD-RTO: 13.2 % (ref 11.8–14.4)
PLATELET # BLD: 229 K/UL (ref 138–453)
PLATELET ESTIMATE: NORMAL
PMV BLD AUTO: 10.8 FL (ref 8.1–13.5)
POTASSIUM SERPL-SCNC: 4.6 MMOL/L (ref 3.7–5.3)
RBC # BLD: 4.77 M/UL (ref 3.95–5.11)
RBC # BLD: NORMAL 10*6/UL
SEG NEUTROPHILS: 50 % (ref 36–65)
SEGMENTED NEUTROPHILS ABSOLUTE COUNT: 3.65 K/UL (ref 1.5–8.1)
SODIUM BLD-SCNC: 138 MMOL/L (ref 135–144)
TOTAL PROTEIN: 7.3 G/DL (ref 6.4–8.3)
TRIGL SERPL-MCNC: 152 MG/DL
URIC ACID: 6.4 MG/DL (ref 2.4–5.7)
VLDLC SERPL CALC-MCNC: ABNORMAL MG/DL (ref 1–30)
WBC # BLD: 7.4 K/UL (ref 3.5–11.3)
WBC # BLD: NORMAL 10*3/UL

## 2020-02-14 PROCEDURE — 83036 HEMOGLOBIN GLYCOSYLATED A1C: CPT

## 2020-02-14 PROCEDURE — 80061 LIPID PANEL: CPT

## 2020-02-14 PROCEDURE — 93005 ELECTROCARDIOGRAM TRACING: CPT

## 2020-02-14 PROCEDURE — 80053 COMPREHEN METABOLIC PANEL: CPT

## 2020-02-14 PROCEDURE — 85025 COMPLETE CBC W/AUTO DIFF WBC: CPT

## 2020-02-14 PROCEDURE — 36415 COLL VENOUS BLD VENIPUNCTURE: CPT

## 2020-02-14 PROCEDURE — 84550 ASSAY OF BLOOD/URIC ACID: CPT

## 2020-02-16 LAB
EKG ATRIAL RATE: 72 BPM
EKG P AXIS: 36 DEGREES
EKG P-R INTERVAL: 152 MS
EKG Q-T INTERVAL: 368 MS
EKG QRS DURATION: 78 MS
EKG QTC CALCULATION (BAZETT): 402 MS
EKG R AXIS: 45 DEGREES
EKG T AXIS: 38 DEGREES
EKG VENTRICULAR RATE: 72 BPM

## 2020-03-13 ENCOUNTER — HOSPITAL ENCOUNTER (OUTPATIENT)
Dept: OCCUPATIONAL THERAPY | Age: 45
Setting detail: THERAPIES SERIES
Discharge: HOME | End: 2020-03-13
Payer: COMMERCIAL

## 2020-07-14 ENCOUNTER — OFFICE VISIT (OUTPATIENT)
Dept: BARIATRICS/WEIGHT MGMT | Age: 45
End: 2020-07-14
Payer: COMMERCIAL

## 2020-07-14 VITALS
WEIGHT: 293 LBS | HEIGHT: 60 IN | HEART RATE: 84 BPM | DIASTOLIC BLOOD PRESSURE: 76 MMHG | BODY MASS INDEX: 57.52 KG/M2 | SYSTOLIC BLOOD PRESSURE: 126 MMHG

## 2020-07-14 PROCEDURE — 99213 OFFICE O/P EST LOW 20 MIN: CPT | Performed by: NURSE PRACTITIONER

## 2020-07-15 NOTE — PROGRESS NOTES
Group Lifestyle Balance Follow-up Progress Note      Subjective     Patient is here for group medical appointment for Group Lifestyle Balance weight management program follow-up for the chronic conditions of HTN, Prediabetes, Asthma, Urinary Incontinence, GERD, Cervical Stenosis, Lymphedema. Patient continues on the program and tolerating well. Total weight gain of 2 lbs. No current issues. 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     Past Surgical History:  Past Surgical History:   Procedure Laterality Date    FOOT SURGERY      TONSILLECTOMY         Family History:  Family History   Problem Relation Age of Onset    High Blood Pressure Mother     Lung Cancer Mother     Cancer Mother     High Blood Pressure Father     Diabetes Father     COPD Father     High Blood Pressure Brother     Thyroid Cancer Brother     Cancer Brother     Heart Disease Maternal Grandmother     Depression Other     High Blood Pressure Other        Social History:  Social History     Socioeconomic History    Marital status: Single     Spouse name: Not on file    Number of children: Not on file    Years of education: Not on file    Highest education level: Not on file   Occupational History    Not on file   Social Needs    Financial resource strain: Not on file    Food insecurity Worry: Not on file     Inability: Not on file    Transportation needs     Medical: Not on file     Non-medical: Not on file   Tobacco Use    Smoking status: Never Smoker    Smokeless tobacco: Never Used   Substance and Sexual Activity    Alcohol use: No    Drug use: No    Sexual activity: Yes     Partners: Male   Lifestyle    Physical activity     Days per week: Not on file     Minutes per session: Not on file    Stress: Not on file   Relationships    Social connections     Talks on phone: Not on file     Gets together: Not on file     Attends Bahai service: Not on file     Active member of club or organization: Not on file     Attends meetings of clubs or organizations: Not on file     Relationship status: Not on file    Intimate partner violence     Fear of current or ex partner: Not on file     Emotionally abused: Not on file     Physically abused: Not on file     Forced sexual activity: Not on file   Other Topics Concern    Not on file   Social History Narrative    Not on file       Current Medications:  Current Outpatient Medications   Medication Sig Dispense Refill    lisinopril (PRINIVIL;ZESTRIL) 20 MG tablet TAKE 1 TABLET BY MOUTH ONE TIME A DAY 90 tablet 1    cetirizine (ZYRTEC) 10 MG tablet TAKE 1 TABLET BY MOUTH ONE TIME A DAY 90 tablet 1    montelukast (SINGULAIR) 10 MG tablet Take 1 tablet by mouth daily 90 tablet 1    pioglitazone (ACTOS) 15 MG tablet TAKE 1 TABLET BY MOUTH ONE TIME A DAY 90 tablet 1    furosemide (LASIX) 20 MG tablet Take 1 tablet by mouth 2 times daily as needed (edema) 180 tablet 1    famotidine (PEPCID) 40 MG tablet Take 1 tablet by mouth 2 times daily 180 tablet 1    albuterol sulfate  (90 Base) MCG/ACT inhaler Inhale 2 puffs into the lungs 4 times daily as needed for Wheezing 1 Inhaler 0    ibuprofen (ADVIL;MOTRIN) 800 MG tablet Take 1 tablet by mouth every 8 hours as needed for Pain 90 tablet 1    nystatin (MYCOSTATIN) 844947 UNIT/GM powder APPLY Mindful Movement   ? Manage Your Stress   ? Sit Less for Health   ? More Volume, Fewer Calories   ? Stay Active   ? Balance Your Thoughts   ? Heart Health    ? Looking Back and Looking Forward    Total time:  90 minutes, greater than 50% of visit spent in group counseling/education. Assessment:       Diagnosis Orders   1. Essential hypertension, benign     2. Gastroesophageal reflux disease, esophagitis presence not specified     3. Morbid obesity ([address]     4. Insulin resistance     5. Mild intermittent asthma without complication     6. Prediabetes     7. Lymphedema         Plan:      Track food and weight. Return to clinic as per group medical appointment schedule. Follow-up:  Return in about 1 week (around 7/21/2020). Orders:  No orders of the defined types were placed in this encounter. Prescriptions:  No orders of the defined types were placed in this encounter.       Electronically signed by:  Hiwot Garcia CNP

## 2020-07-21 ENCOUNTER — OFFICE VISIT (OUTPATIENT)
Dept: BARIATRICS/WEIGHT MGMT | Age: 45
End: 2020-07-21
Payer: COMMERCIAL

## 2020-07-21 VITALS
BODY MASS INDEX: 57.52 KG/M2 | SYSTOLIC BLOOD PRESSURE: 118 MMHG | DIASTOLIC BLOOD PRESSURE: 72 MMHG | HEART RATE: 74 BPM | HEIGHT: 60 IN | WEIGHT: 293 LBS

## 2020-07-21 PROCEDURE — 99213 OFFICE O/P EST LOW 20 MIN: CPT | Performed by: NURSE PRACTITIONER

## 2020-07-21 NOTE — PROGRESS NOTES
Group Lifestyle Balance Follow-up Progress Note      Subjective     Patient is here for group medical appointment for Group Lifestyle Balance weight management program follow-up for the chronic conditions of HTN, Prediabetes, Asthma, Urinary Incontinence, GERD, Cervical Stenosis, Lymphedema. Patient continues on the program and tolerating well. Total weight loss of 1 lb. No current issues. 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     Past Surgical History:  Past Surgical History:   Procedure Laterality Date    FOOT SURGERY      TONSILLECTOMY         Family History:  Family History   Problem Relation Age of Onset    High Blood Pressure Mother     Lung Cancer Mother     Cancer Mother     High Blood Pressure Father     Diabetes Father     COPD Father     High Blood Pressure Brother     Thyroid Cancer Brother     Cancer Brother     Heart Disease Maternal Grandmother     Depression Other     High Blood Pressure Other        Social History:  Social History     Socioeconomic History    Marital status: Single     Spouse name: Not on file    Number of children: Not on file    Years of education: Not on file    Highest education level: Not on file   Occupational History    Not on file   Social Needs    Financial resource strain: Not on file    Food insecurity Worry: Not on file     Inability: Not on file    Transportation needs     Medical: Not on file     Non-medical: Not on file   Tobacco Use    Smoking status: Never Smoker    Smokeless tobacco: Never Used   Substance and Sexual Activity    Alcohol use: No    Drug use: No    Sexual activity: Yes     Partners: Male   Lifestyle    Physical activity     Days per week: Not on file     Minutes per session: Not on file    Stress: Not on file   Relationships    Social connections     Talks on phone: Not on file     Gets together: Not on file     Attends Rastafari service: Not on file     Active member of club or organization: Not on file     Attends meetings of clubs or organizations: Not on file     Relationship status: Not on file    Intimate partner violence     Fear of current or ex partner: Not on file     Emotionally abused: Not on file     Physically abused: Not on file     Forced sexual activity: Not on file   Other Topics Concern    Not on file   Social History Narrative    Not on file       Current Medications:  Current Outpatient Medications   Medication Sig Dispense Refill    montelukast (SINGULAIR) 10 MG tablet Take 1 tablet by mouth daily 90 tablet 1    pioglitazone (ACTOS) 15 MG tablet TAKE 1 TABLET BY MOUTH ONE TIME A DAY 90 tablet 1    furosemide (LASIX) 20 MG tablet Take 1 tablet by mouth 2 times daily as needed (edema) 180 tablet 1    famotidine (PEPCID) 40 MG tablet Take 1 tablet by mouth 2 times daily 180 tablet 1    lisinopril (PRINIVIL;ZESTRIL) 20 MG tablet TAKE 1 TABLET BY MOUTH ONE TIME A DAY 90 tablet 1    cetirizine (ZYRTEC) 10 MG tablet TAKE 1 TABLET BY MOUTH ONE TIME A DAY 90 tablet 1    doxycycline hyclate (VIBRA-TABS) 100 MG tablet Take 1 tablet by mouth 2 times daily for 10 days 20 tablet 0    albuterol sulfate  (90 Base) MCG/ACT inhaler Inhale 2 puffs into the lungs 4 times daily as needed for Wheezing 1 Inhaler 0    ibuprofen (ADVIL;MOTRIN) 800 MG tablet Take 1 tablet by mouth every 8 hours as needed for Pain 90 tablet 1    nystatin (MYCOSTATIN) 936720 UNIT/GM powder APPLY EXTERNALLY THREE TIMES A DAY  60 g 0     Current Facility-Administered Medications   Medication Dose Route Frequency Provider Last Rate Last Dose    methylPREDNISolone acetate (DEPO-MEDROL) injection 80 mg  80 mg Intramuscular Once Rebbeca Jamie, DO           Vital Signs:  /72 (Site: Right Upper Arm, Position: Sitting, Cuff Size: Large Adult)   Pulse 74   Ht 5' (1.524 m)   Wt (!) 339 lb (153.8 kg)   BMI 66.21 kg/m²     BMI/Height/Weight:  Body mass index is 66.21 kg/m². Review of Systems  Constitutional: Weight loss      Objective:      Physical Exam   Vital signs reviewed. General Appearance: Well-developed and well-nourished. No acute distress. Skin: Warm, dry. Head: Normocephalic. Pulmonary/Chest: Normal respiratory effort. Musculoskeletal: Movement x4. Neurological:  Alert and oriented. Individual goal for this encounter:  Get off meds or take less meds, help daughter lose weight also, lessen back/knee pain, be more active with grandkids, learn to eat better. X? Vital signs reviewed and discussed with patient. X ? Labs/EKG reviewed and discussed with patient. ? Blood sugar log reviewed and discussed with patient. ? Physical activity discussed. ? Medication changes recommended. ? Smoking cessation discussed. X? Specific questions/concerns addressed. Specific group medical question(s) addressed in this encounter:  Discussion about fatty liver. Group discussion topic for this encounter:     ? Welcome Jumpstart  X ? Be a Fat & Calorie    ? Healthy Eating   ? Move Those Muscles   ? Tip the Calorie Balance   ? Take charge of What's Around You   ? Problem Solving   ? Step Up Your Physical Activity Plan   ? Manage Slips and Self-Defeating Thoughts   ? [address]   ? Make Social Cues Work for Eliot Griffiths   ?  Ways to Stay Motivated   ? Preparing for Long Term Self-Management   ? Take Charge of Your Lifestyle   ? Mindful Eating, Mindful Movement   ? Manage Your Stress   ? Sit Less for Health   ? More Volume, Fewer Calories   ? Stay Active   ? Balance Your Thoughts   ? Heart Health    ? Looking Back and Looking Forward    Total time:  90 minutes, greater than 50% of visit spent in group counseling/education. Assessment:       Diagnosis Orders   1. Essential hypertension, benign     2. Gastroesophageal reflux disease, esophagitis presence not specified     3. Morbid obesity ([address]     4. Mild intermittent asthma without complication     5. Prediabetes     6. Lymphedema         Plan:      Track food and weight. Return to clinic as per group medical appointment schedule. Follow-up:  Return in about 1 week (around 7/28/2020). Orders:  No orders of the defined types were placed in this encounter. Prescriptions:  No orders of the defined types were placed in this encounter.       Electronically signed by:  Paulette Gomez CNP

## 2020-08-06 ENCOUNTER — OFFICE VISIT (OUTPATIENT)
Dept: BARIATRICS/WEIGHT MGMT | Age: 45
End: 2020-08-06
Payer: COMMERCIAL

## 2020-08-06 VITALS
WEIGHT: 293 LBS | BODY MASS INDEX: 57.52 KG/M2 | SYSTOLIC BLOOD PRESSURE: 118 MMHG | HEART RATE: 76 BPM | DIASTOLIC BLOOD PRESSURE: 78 MMHG | HEIGHT: 60 IN

## 2020-08-06 PROCEDURE — 99213 OFFICE O/P EST LOW 20 MIN: CPT | Performed by: NURSE PRACTITIONER

## 2020-08-06 NOTE — PROGRESS NOTES
Worry: Not on file     Inability: Not on file    Transportation needs     Medical: Not on file     Non-medical: Not on file   Tobacco Use    Smoking status: Never Smoker    Smokeless tobacco: Never Used   Substance and Sexual Activity    Alcohol use: No    Drug use: No    Sexual activity: Yes     Partners: Male   Lifestyle    Physical activity     Days per week: Not on file     Minutes per session: Not on file    Stress: Not on file   Relationships    Social connections     Talks on phone: Not on file     Gets together: Not on file     Attends Cheondoism service: Not on file     Active member of club or organization: Not on file     Attends meetings of clubs or organizations: Not on file     Relationship status: Not on file    Intimate partner violence     Fear of current or ex partner: Not on file     Emotionally abused: Not on file     Physically abused: Not on file     Forced sexual activity: Not on file   Other Topics Concern    Not on file   Social History Narrative    Not on file       Current Medications:  Current Outpatient Medications   Medication Sig Dispense Refill    montelukast (SINGULAIR) 10 MG tablet Take 1 tablet by mouth daily 90 tablet 1    pioglitazone (ACTOS) 15 MG tablet TAKE 1 TABLET BY MOUTH ONE TIME A DAY 90 tablet 1    furosemide (LASIX) 20 MG tablet Take 1 tablet by mouth 2 times daily as needed (edema) 180 tablet 1    famotidine (PEPCID) 40 MG tablet Take 1 tablet by mouth 2 times daily 180 tablet 1    lisinopril (PRINIVIL;ZESTRIL) 20 MG tablet TAKE 1 TABLET BY MOUTH ONE TIME A DAY 90 tablet 1    cetirizine (ZYRTEC) 10 MG tablet TAKE 1 TABLET BY MOUTH ONE TIME A DAY 90 tablet 1    albuterol sulfate  (90 Base) MCG/ACT inhaler Inhale 2 puffs into the lungs 4 times daily as needed for Wheezing 1 Inhaler 0    ibuprofen (ADVIL;MOTRIN) 800 MG tablet Take 1 tablet by mouth every 8 hours as needed for Pain 90 tablet 1    nystatin (MYCOSTATIN) 915822 UNIT/GM powder APPLY EXTERNALLY THREE TIMES A DAY  60 g 0     Current Facility-Administered Medications   Medication Dose Route Frequency Provider Last Rate Last Dose    methylPREDNISolone acetate (DEPO-MEDROL) injection 80 mg  80 mg Intramuscular Once Rebbeca Jamie, DO           Vital Signs:  /78 (Site: Right Upper Arm, Position: Sitting, Cuff Size: Large Adult)   Pulse 76   Ht 5' (1.524 m)   Wt (!) 336 lb (152.4 kg)   BMI 65.62 kg/m²     BMI/Height/Weight:  Body mass index is 65.62 kg/m². Review of Systems  Constitutional: Weight loss      Objective:      Physical Exam   Vital signs reviewed. General Appearance: Well-developed and well-nourished. No acute distress. Skin: Warm, dry. Head: Normocephalic. Pulmonary/Chest: Normal respiratory effort. Musculoskeletal: Movement x4. Neurological:  Alert and oriented. Individual goal for this encounter:  Get off meds or take less meds, help daughter lose weight also, lessen back/knee pain, be more active with grandkids, learn to eat better. X? Vital signs reviewed and discussed with patient. ? Labs/EKG reviewed and discussed with patient. ? Blood sugar log reviewed and discussed with patient. ? Physical activity discussed. ? Medication changes recommended. ? Smoking cessation discussed. X? Specific questions/concerns addressed. Specific group medical question(s) addressed in this encounter:  Discussion about gallbladder disease. Group discussion topic for this encounter:     ? [place]ric[place] Must   ? Be a Fat & Calorie    ? Healthy Eating   ? Move Those Muscles  X ? Tip the Calorie Balance   ? Take charge of What's Around You   ? Problem Solving   ? Step Up Your Physical Activity Plan   ? Manage Slips and Self-Defeating Thoughts   ? [address]   ? Make Social Cues Work for Eliot Griffiths   ? Ways to Stay Motivated   ? Preparing for Long Term Self-Management   ? Take Charge of Your Lifestyle   ?  Mindful Eating, Mindful Movement   ? Manage Your Stress   ? Sit Less for Health   ? More Volume, Fewer Calories   ? Stay Active   ? Balance Your Thoughts   ? Heart Health    ? Looking Back and Looking Forward    Total time:  90 minutes, greater than 50% of visit spent in group counseling/education. Assessment:       Diagnosis Orders   1. Essential hypertension, benign     2. Gastroesophageal reflux disease, esophagitis presence not specified     3. Morbid obesity ([address]     4. Insulin resistance     5. Mild intermittent asthma without complication     6. Prediabetes     7. Lymphedema         Plan:      Track food and weight. Return to clinic as per group medical appointment schedule. Follow-up:  Return in about 1 week (around 8/13/2020). Orders:  No orders of the defined types were placed in this encounter. Prescriptions:  No orders of the defined types were placed in this encounter.       Electronically signed by:  Elan Castanon CNP

## 2020-08-20 ENCOUNTER — OFFICE VISIT (OUTPATIENT)
Dept: BARIATRICS/WEIGHT MGMT | Age: 45
End: 2020-08-20
Payer: COMMERCIAL

## 2020-08-20 VITALS
HEIGHT: 60 IN | DIASTOLIC BLOOD PRESSURE: 74 MMHG | HEART RATE: 80 BPM | SYSTOLIC BLOOD PRESSURE: 126 MMHG | WEIGHT: 293 LBS | BODY MASS INDEX: 57.52 KG/M2

## 2020-08-20 PROCEDURE — 99213 OFFICE O/P EST LOW 20 MIN: CPT | Performed by: NURSE PRACTITIONER

## 2020-08-20 NOTE — PROGRESS NOTES
Group Lifestyle Balance Follow-up Progress Note      Subjective     Patient is here for group medical appointment for Group Lifestyle Balance weight management program follow-up for the chronic conditions of HTN, Prediabetes, Asthma, Urinary Incontinence, GERD, Cervical Stenosis, Lymphedema. Patient continues on the program and tolerating well. Total weight loss of 4 lbs. No current issues. 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     Past Surgical History:  Past Surgical History:   Procedure Laterality Date    FOOT SURGERY      TONSILLECTOMY         Family History:  Family History   Problem Relation Age of Onset    High Blood Pressure Mother     Lung Cancer Mother     Cancer Mother     High Blood Pressure Father     Diabetes Father     COPD Father     High Blood Pressure Brother     Thyroid Cancer Brother     Cancer Brother     Heart Disease Maternal Grandmother     Depression Other     High Blood Pressure Other        Social History:  Social History     Socioeconomic History    Marital status: Single     Spouse name: Not on file    Number of children: Not on file    Years of education: Not on file    Highest education level: Not on file   Occupational History    Not on file   Social Needs    Financial resource strain: Not on file    Food insecurity Worry: Not on file     Inability: Not on file    Transportation needs     Medical: Not on file     Non-medical: Not on file   Tobacco Use    Smoking status: Never Smoker    Smokeless tobacco: Never Used   Substance and Sexual Activity    Alcohol use: No    Drug use: No    Sexual activity: Yes     Partners: Male   Lifestyle    Physical activity     Days per week: Not on file     Minutes per session: Not on file    Stress: Not on file   Relationships    Social connections     Talks on phone: Not on file     Gets together: Not on file     Attends Muslim service: Not on file     Active member of club or organization: Not on file     Attends meetings of clubs or organizations: Not on file     Relationship status: Not on file    Intimate partner violence     Fear of current or ex partner: Not on file     Emotionally abused: Not on file     Physically abused: Not on file     Forced sexual activity: Not on file   Other Topics Concern    Not on file   Social History Narrative    Not on file       Current Medications:  Current Outpatient Medications   Medication Sig Dispense Refill    ibuprofen (ADVIL;MOTRIN) 800 MG tablet Take 1 tablet by mouth every 8 hours as needed for Pain 90 tablet 1    montelukast (SINGULAIR) 10 MG tablet Take 1 tablet by mouth daily 90 tablet 1    pioglitazone (ACTOS) 15 MG tablet TAKE 1 TABLET BY MOUTH ONE TIME A DAY 90 tablet 1    furosemide (LASIX) 20 MG tablet Take 1 tablet by mouth 2 times daily as needed (edema) 180 tablet 1    famotidine (PEPCID) 40 MG tablet Take 1 tablet by mouth 2 times daily 180 tablet 1    lisinopril (PRINIVIL;ZESTRIL) 20 MG tablet TAKE 1 TABLET BY MOUTH ONE TIME A DAY 90 tablet 1    cetirizine (ZYRTEC) 10 MG tablet TAKE 1 TABLET BY MOUTH ONE TIME A DAY 90 tablet 1    albuterol sulfate  (90 Base) MCG/ACT inhaler Inhale 2 puffs into the lungs 4 times daily as needed for Wheezing 1 Inhaler 0    nystatin (MYCOSTATIN) 674736 UNIT/GM powder APPLY EXTERNALLY THREE TIMES A DAY  60 g 0     Current Facility-Administered Medications   Medication Dose Route Frequency Provider Last Rate Last Dose    methylPREDNISolone acetate (DEPO-MEDROL) injection 80 mg  80 mg Intramuscular Once Nan Reining, DO           Vital Signs:  /74 (Site: Right Upper Arm, Position: Sitting, Cuff Size: Large Adult)   Pulse 80   Ht 5' (1.524 m)   Wt (!) 336 lb (152.4 kg)   BMI 65.62 kg/m²     BMI/Height/Weight:  Body mass index is 65.62 kg/m². Review of Systems  Constitutional: Weight loss      Objective:      Physical Exam   Vital signs reviewed. General Appearance: Well-developed and well-nourished. No acute distress. Skin: Warm, dry. Head: Normocephalic. Pulmonary/Chest: Normal respiratory effort. Musculoskeletal: Movement x4. Neurological:  Alert and oriented. Individual goal for this encounter:  Get off meds or take less meds, help daughter lose weight also, lessen back/knee pain, be more active with grandkids, learn to eat better. X? Vital signs reviewed and discussed with patient. ? Labs/EKG reviewed and discussed with patient. ? Blood sugar log reviewed and discussed with patient. ? Physical activity discussed. ? Medication changes recommended. ? Smoking cessation discussed. X? Specific questions/concerns addressed. Specific group medical question(s) addressed in this encounter:  Discussion about diabetes. Group discussion topic for this encounter:     ? Monika Eng   ? Be a Fat & Calorie    ? Healthy Eating   ? Move Those Muscles   ? Tip the Calorie Balance   ? Take charge of What's Around You  X ? Problem Solving   ? Step Up Your Physical Activity Plan   ? Manage Slips and Self-Defeating Thoughts   ? [address]   ? Make Social Cues Work for Eliot Griffiths   ? Ways to Stay Motivated   ? Preparing for Long Term Self-Management   ? Take Charge of Your Lifestyle   ?  Mindful Eating, Mindful

## 2020-08-27 ENCOUNTER — OFFICE VISIT (OUTPATIENT)
Dept: BARIATRICS/WEIGHT MGMT | Age: 45
End: 2020-08-27
Payer: COMMERCIAL

## 2020-08-27 VITALS
HEIGHT: 60 IN | WEIGHT: 293 LBS | HEART RATE: 82 BPM | DIASTOLIC BLOOD PRESSURE: 80 MMHG | SYSTOLIC BLOOD PRESSURE: 122 MMHG | BODY MASS INDEX: 57.52 KG/M2

## 2020-08-27 PROCEDURE — 99213 OFFICE O/P EST LOW 20 MIN: CPT | Performed by: NURSE PRACTITIONER

## 2020-08-27 NOTE — PROGRESS NOTES
Group Lifestyle Balance Follow-up Progress Note      Subjective     Patient is here for group medical appointment for Group Lifestyle Balance weight management program follow-up for the chronic conditions of HTN, Prediabetes, Asthma, Urinary Incontinence, GERD, Cervical Stenosis, Lymphedema. Patient continues on the program and tolerating well. Total weight loss of 4 lbs. No current issues. 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     Past Surgical History:  Past Surgical History:   Procedure Laterality Date    FOOT SURGERY      TONSILLECTOMY         Family History:  Family History   Problem Relation Age of Onset    High Blood Pressure Mother     Lung Cancer Mother     Cancer Mother     High Blood Pressure Father     Diabetes Father     COPD Father     High Blood Pressure Brother     Thyroid Cancer Brother     Cancer Brother     Heart Disease Maternal Grandmother     Depression Other     High Blood Pressure Other        Social History:  Social History     Socioeconomic History    Marital status: Single     Spouse name: Not on file    Number of children: Not on file    Years of education: Not on file    Highest education level: Not on file   Occupational History    Not on file   Social Needs    Financial resource strain: Not on file    Food insecurity Worry: Not on file     Inability: Not on file    Transportation needs     Medical: Not on file     Non-medical: Not on file   Tobacco Use    Smoking status: Never Smoker    Smokeless tobacco: Never Used   Substance and Sexual Activity    Alcohol use: No    Drug use: No    Sexual activity: Yes     Partners: Male   Lifestyle    Physical activity     Days per week: Not on file     Minutes per session: Not on file    Stress: Not on file   Relationships    Social connections     Talks on phone: Not on file     Gets together: Not on file     Attends Confucianism service: Not on file     Active member of club or organization: Not on file     Attends meetings of clubs or organizations: Not on file     Relationship status: Not on file    Intimate partner violence     Fear of current or ex partner: Not on file     Emotionally abused: Not on file     Physically abused: Not on file     Forced sexual activity: Not on file   Other Topics Concern    Not on file   Social History Narrative    Not on file       Current Medications:  Current Outpatient Medications   Medication Sig Dispense Refill    ibuprofen (ADVIL;MOTRIN) 800 MG tablet Take 1 tablet by mouth every 8 hours as needed for Pain 90 tablet 1    montelukast (SINGULAIR) 10 MG tablet Take 1 tablet by mouth daily 90 tablet 1    pioglitazone (ACTOS) 15 MG tablet TAKE 1 TABLET BY MOUTH ONE TIME A DAY 90 tablet 1    furosemide (LASIX) 20 MG tablet Take 1 tablet by mouth 2 times daily as needed (edema) 180 tablet 1    famotidine (PEPCID) 40 MG tablet Take 1 tablet by mouth 2 times daily 180 tablet 1    lisinopril (PRINIVIL;ZESTRIL) 20 MG tablet TAKE 1 TABLET BY MOUTH ONE TIME A DAY 90 tablet 1    cetirizine (ZYRTEC) 10 MG tablet TAKE 1 TABLET BY MOUTH ONE TIME A DAY 90 tablet 1    albuterol sulfate  (90 Base) MCG/ACT inhaler Inhale 2 puffs into the lungs 4 times daily as needed for Wheezing 1 Inhaler 0    nystatin (MYCOSTATIN) 529773 UNIT/GM powder APPLY EXTERNALLY THREE TIMES A DAY  60 g 0     Current Facility-Administered Medications   Medication Dose Route Frequency Provider Last Rate Last Dose    methylPREDNISolone acetate (DEPO-MEDROL) injection 80 mg  80 mg Intramuscular Once Stephany Shock, DO           Vital Signs:  /80 (Site: Right Upper Arm, Position: Sitting, Cuff Size: Large Adult)   Pulse 82   Ht 5' (1.524 m)   Wt (!) 336 lb (152.4 kg)   BMI 65.62 kg/m²     BMI/Height/Weight:  Body mass index is 65.62 kg/m². Review of Systems  Constitutional: Weight loss      Objective:      Physical Exam   Vital signs reviewed. General Appearance: Well-developed and well-nourished. No acute distress. Skin: Warm, dry. Head: Normocephalic. Pulmonary/Chest: Normal respiratory effort. Musculoskeletal: Movement x4. Neurological:  Alert and oriented. Individual goal for this encounter:  Get off meds or take less meds, help daughter lose weight also, lessen back/knee pain, be more active with grandkids, learn to eat better. X? Vital signs reviewed and discussed with patient. ? Labs/EKG reviewed and discussed with patient. ? Blood sugar log reviewed and discussed with patient. ? Physical activity discussed. ? Medication changes recommended. ? Smoking cessation discussed. X? Specific questions/concerns addressed. Specific group medical question(s) addressed in this encounter:  Discussion about stress. Group discussion topic for this encounter:     ? Dolores Fay   ? Be a Fat & Calorie    ? Healthy Eating   ? Move Those Muscles   ? Tip the Calorie Balance   ? Take charge of What's Around You   ? Problem Solving  X ? Step Up Your Physical Activity Plan   ? Manage Slips and Self-Defeating Thoughts   ? [address]   ? Make Social Cues Work for [place]ia [place]   ? Ways to Stay Motivated   ? Preparing for Long Term Self-Management   ? Take Charge of Your Lifestyle   ?  Mindful Eating, Mindful Movement   ? Manage Your Stress   ? Sit Less for Health   ? More Volume, Fewer Calories   ? Stay Active   ? Balance Your Thoughts   ? Heart Health    ? Looking Back and Looking Forward    Total time:  90 minutes, greater than 50% of visit spent in group counseling/education. Assessment:       Diagnosis Orders   1. Essential hypertension, benign     2. Gastroesophageal reflux disease, esophagitis presence not specified     3. Morbid obesity ([address]     4. Insulin resistance     5. Urge incontinence of urine     6. Mild intermittent asthma without complication     7. Prediabetes     8. Lymphedema         Plan:      Track food and weight. Return to clinic as per group medical appointment schedule. Follow-up:  Return in about 1 week (around 9/3/2020). Orders:  No orders of the defined types were placed in this encounter. Prescriptions:  No orders of the defined types were placed in this encounter.       Electronically signed by:  Landon Gan CNP

## 2020-09-03 ENCOUNTER — OFFICE VISIT (OUTPATIENT)
Dept: BARIATRICS/WEIGHT MGMT | Age: 45
End: 2020-09-03
Payer: COMMERCIAL

## 2020-09-03 VITALS
SYSTOLIC BLOOD PRESSURE: 118 MMHG | HEART RATE: 74 BPM | BODY MASS INDEX: 57.52 KG/M2 | WEIGHT: 293 LBS | HEIGHT: 60 IN | DIASTOLIC BLOOD PRESSURE: 76 MMHG

## 2020-09-03 PROCEDURE — 99213 OFFICE O/P EST LOW 20 MIN: CPT | Performed by: NURSE PRACTITIONER

## 2020-09-04 NOTE — PROGRESS NOTES
Group Lifestyle Balance Follow-up Progress Note      Subjective     Patient is here for group medical appointment for Group Lifestyle Balance weight management program follow-up for the chronic conditions of HTN, Prediabetes, Asthma, Urinary Incontinence, GERD, Cervical Stenosis, Lymphedema. Patient continues on the program and tolerating well. Total weight loss of 0 lbs. No current issues. 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     Past Surgical History:  Past Surgical History:   Procedure Laterality Date    FOOT SURGERY      TONSILLECTOMY         Family History:  Family History   Problem Relation Age of Onset    High Blood Pressure Mother     Lung Cancer Mother     Cancer Mother     High Blood Pressure Father     Diabetes Father     COPD Father     High Blood Pressure Brother     Thyroid Cancer Brother     Cancer Brother     Heart Disease Maternal Grandmother     Depression Other     High Blood Pressure Other        Social History:  Social History     Socioeconomic History    Marital status: Single     Spouse name: Not on file    Number of children: Not on file    Years of education: Not on file    Highest education level: Not on file   Occupational History    Not on file   Social Needs    Financial resource strain: Not on file    Food insecurity Worry: Not on file     Inability: Not on file    Transportation needs     Medical: Not on file     Non-medical: Not on file   Tobacco Use    Smoking status: Never Smoker    Smokeless tobacco: Never Used   Substance and Sexual Activity    Alcohol use: No    Drug use: No    Sexual activity: Yes     Partners: Male   Lifestyle    Physical activity     Days per week: Not on file     Minutes per session: Not on file    Stress: Not on file   Relationships    Social connections     Talks on phone: Not on file     Gets together: Not on file     Attends Sikh service: Not on file     Active member of club or organization: Not on file     Attends meetings of clubs or organizations: Not on file     Relationship status: Not on file    Intimate partner violence     Fear of current or ex partner: Not on file     Emotionally abused: Not on file     Physically abused: Not on file     Forced sexual activity: Not on file   Other Topics Concern    Not on file   Social History Narrative    Not on file       Current Medications:  Current Outpatient Medications   Medication Sig Dispense Refill    ibuprofen (ADVIL;MOTRIN) 800 MG tablet Take 1 tablet by mouth every 8 hours as needed for Pain 90 tablet 1    montelukast (SINGULAIR) 10 MG tablet Take 1 tablet by mouth daily 90 tablet 1    pioglitazone (ACTOS) 15 MG tablet TAKE 1 TABLET BY MOUTH ONE TIME A DAY 90 tablet 1    furosemide (LASIX) 20 MG tablet Take 1 tablet by mouth 2 times daily as needed (edema) 180 tablet 1    famotidine (PEPCID) 40 MG tablet Take 1 tablet by mouth 2 times daily 180 tablet 1    lisinopril (PRINIVIL;ZESTRIL) 20 MG tablet TAKE 1 TABLET BY MOUTH ONE TIME A DAY 90 tablet 1    cetirizine (ZYRTEC) 10 MG tablet TAKE 1 TABLET BY MOUTH ONE TIME A DAY 90 tablet 1    albuterol sulfate  (90 Base) MCG/ACT inhaler Inhale 2 puffs into the lungs 4 times daily as needed for Wheezing 1 Inhaler 0    nystatin (MYCOSTATIN) 074697 UNIT/GM powder APPLY EXTERNALLY THREE TIMES A DAY  60 g 0     Current Facility-Administered Medications   Medication Dose Route Frequency Provider Last Rate Last Dose    methylPREDNISolone acetate (DEPO-MEDROL) injection 80 mg  80 mg Intramuscular Once Ame Landis, DO           Vital Signs:  /76 (Site: Right Upper Arm, Position: Sitting, Cuff Size: Large Adult)   Pulse 74   Ht 5' (1.524 m)   Wt (!) 340 lb (154.2 kg)   BMI 66.40 kg/m²     BMI/Height/Weight:  Body mass index is 66.4 kg/m². Review of Systems  Constitutional: No weight loss/gain      Objective:      Physical Exam   Vital signs reviewed. General Appearance: Well-developed and well-nourished. No acute distress. Skin: Warm, dry. Head: Normocephalic. Pulmonary/Chest: Normal respiratory effort. Musculoskeletal: Movement x4. Neurological:  Alert and oriented. Individual goal for this encounter:  Get off meds or take less meds, help daughter lose weight also, lessen back/knee pain, be more active with grandkids, learn to eat better. X? Vital signs reviewed and discussed with patient. ? Labs/EKG reviewed and discussed with patient. ? Blood sugar log reviewed and discussed with patient. ? Physical activity discussed. ? Medication changes recommended. ? Smoking cessation discussed. X? Specific questions/concerns addressed. Specific group medical question(s) addressed in this encounter:  Discussion about sleep apnea. Group discussion topic for this encounter:     ? Josh Gildardo   ? Be a Fat & Calorie    ? Healthy Eating   ? Move Those Muscles   ? Tip the Calorie Balance   ? Take charge of What's Around You   ? Problem Solving   ? Step Up Your Physical Activity Plan  X ? Manage Slips and Self-Defeating Thoughts   ? [address]   ? Make Social Cues Work for Eliot Griffiths   ? Ways to Stay Motivated   ? Preparing for Long Term Self-Management   ? Take Charge of Your Lifestyle   ?  Mindful Eating, Mindful Movement   ? Manage Your Stress   ? Sit Less for Health   ? More Volume, Fewer Calories   ? Stay Active   ? Balance Your Thoughts   ? Heart Health    ? Looking Back and Looking Forward    Total time:  90 minutes, greater than 50% of visit spent in group counseling/education. Assessment:       Diagnosis Orders   1. Essential hypertension, benign     2. Gastroesophageal reflux disease, esophagitis presence not specified     3. Morbid obesity ([address]     4. Insulin resistance     5. Stress incontinence     6. Mild intermittent asthma without complication     7. Prediabetes     8. Lymphedema         Plan:      Track food and weight. Return to clinic as per group medical appointment schedule. Follow-up:  Return in about 1 week (around 9/10/2020). Orders:  No orders of the defined types were placed in this encounter. Prescriptions:  No orders of the defined types were placed in this encounter.       Electronically signed by:  Hiwot Garcia CNP

## 2020-09-10 ENCOUNTER — OFFICE VISIT (OUTPATIENT)
Dept: BARIATRICS/WEIGHT MGMT | Age: 45
End: 2020-09-10
Payer: COMMERCIAL

## 2020-09-10 VITALS
DIASTOLIC BLOOD PRESSURE: 80 MMHG | WEIGHT: 293 LBS | HEART RATE: 76 BPM | SYSTOLIC BLOOD PRESSURE: 122 MMHG | BODY MASS INDEX: 57.52 KG/M2 | HEIGHT: 60 IN

## 2020-09-10 PROCEDURE — 99213 OFFICE O/P EST LOW 20 MIN: CPT | Performed by: NURSE PRACTITIONER

## 2020-09-10 NOTE — PROGRESS NOTES
Group Lifestyle Balance Follow-up Progress Note      Subjective     Patient is here for group medical appointment for Group Lifestyle Balance weight management program follow-up for the chronic conditions of HTN, Prediabetes, Asthma, Urinary Incontinence, GERD, Cervical Stenosis, Lymphedema. Patient continues on the program and tolerating well. Total weight loss of 6 lbs. No current issues. 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     Past Surgical History:  Past Surgical History:   Procedure Laterality Date    FOOT SURGERY      TONSILLECTOMY         Family History:  Family History   Problem Relation Age of Onset    High Blood Pressure Mother     Lung Cancer Mother     Cancer Mother     High Blood Pressure Father     Diabetes Father     COPD Father     High Blood Pressure Brother     Thyroid Cancer Brother     Cancer Brother     Heart Disease Maternal Grandmother     Depression Other     High Blood Pressure Other        Social History:  Social History     Socioeconomic History    Marital status: Single     Spouse name: Not on file    Number of children: Not on file    Years of education: Not on file    Highest education level: Not on file   Occupational History    Not on file   Social Needs    Financial resource strain: Not on file    Food insecurity Worry: Not on file     Inability: Not on file    Transportation needs     Medical: Not on file     Non-medical: Not on file   Tobacco Use    Smoking status: Never Smoker    Smokeless tobacco: Never Used   Substance and Sexual Activity    Alcohol use: No    Drug use: No    Sexual activity: Yes     Partners: Male   Lifestyle    Physical activity     Days per week: Not on file     Minutes per session: Not on file    Stress: Not on file   Relationships    Social connections     Talks on phone: Not on file     Gets together: Not on file     Attends Oriental orthodox service: Not on file     Active member of club or organization: Not on file     Attends meetings of clubs or organizations: Not on file     Relationship status: Not on file    Intimate partner violence     Fear of current or ex partner: Not on file     Emotionally abused: Not on file     Physically abused: Not on file     Forced sexual activity: Not on file   Other Topics Concern    Not on file   Social History Narrative    Not on file       Current Medications:  Current Outpatient Medications   Medication Sig Dispense Refill    ibuprofen (ADVIL;MOTRIN) 800 MG tablet Take 1 tablet by mouth every 8 hours as needed for Pain 90 tablet 1    montelukast (SINGULAIR) 10 MG tablet Take 1 tablet by mouth daily 90 tablet 1    pioglitazone (ACTOS) 15 MG tablet TAKE 1 TABLET BY MOUTH ONE TIME A DAY 90 tablet 1    furosemide (LASIX) 20 MG tablet Take 1 tablet by mouth 2 times daily as needed (edema) 180 tablet 1    famotidine (PEPCID) 40 MG tablet Take 1 tablet by mouth 2 times daily 180 tablet 1    lisinopril (PRINIVIL;ZESTRIL) 20 MG tablet TAKE 1 TABLET BY MOUTH ONE TIME A DAY 90 tablet 1    cetirizine (ZYRTEC) 10 MG tablet TAKE 1 TABLET BY MOUTH ONE TIME A DAY 90 tablet 1    albuterol sulfate  (90 Base) MCG/ACT inhaler Inhale 2 puffs into the lungs 4 times daily as needed for Wheezing 1 Inhaler 0    nystatin (MYCOSTATIN) 227023 UNIT/GM powder APPLY EXTERNALLY THREE TIMES A DAY  60 g 0     Current Facility-Administered Medications   Medication Dose Route Frequency Provider Last Rate Last Dose    methylPREDNISolone acetate (DEPO-MEDROL) injection 80 mg  80 mg Intramuscular Once Oscar Guardado,            Vital Signs:  /80 (Site: Right Upper Arm, Position: Sitting, Cuff Size: Large Adult)   Pulse 76   Ht 5' (1.524 m)   Wt (!) 334 lb (151.5 kg)   BMI 65.23 kg/m²     BMI/Height/Weight:  Body mass index is 65.23 kg/m². Review of Systems  Constitutional: Weight loss      Objective:      Physical Exam   Vital signs reviewed. General Appearance: Well-developed and well-nourished. No acute distress. Skin: Warm, dry. Head: Normocephalic. Pulmonary/Chest: Normal respiratory effort. Musculoskeletal: Movement x4. Neurological:  Alert and oriented. Individual goal for this encounter:  Get off meds or take less meds, help daughter lose weight also, lessen back/knee pain, be more active with grandkids, learn to eat better. X? Vital signs reviewed and discussed with patient. ? Labs/EKG reviewed and discussed with patient. ? Blood sugar log reviewed and discussed with patient. ? Physical activity discussed. ? Medication changes recommended. ? Smoking cessation discussed. X? Specific questions/concerns addressed. Specific group medical question(s) addressed in this encounter:  Discussion about varicose veins. Group discussion topic for this encounter:     ? Gerline November   ? Be a Fat & Calorie    ? Healthy Eating   ? Move Those Muscles   ? Tip the Calorie Balance   ? Take charge of What's Around You   ? Problem Solving   ? Step Up Your Physical Activity Plan   ? Manage Slips and Self-Defeating Thoughts  X ? [address]   ? Make Social Cues Work for Eliot Griffiths   ? Ways to Stay Motivated   ? Preparing for Long Term Self-Management   ? Take Charge of Your Lifestyle   ?  Mindful Eating, Mindful Movement   ? Manage Your Stress   ? Sit Less for Health   ? More Volume, Fewer Calories   ? Stay Active   ? Balance Your Thoughts   ? Heart Health    ? Looking Back and Looking Forward    Total time:  90 minutes, greater than 50% of visit spent in group counseling/education. Assessment:       Diagnosis Orders   1. Essential hypertension, benign     2. Gastroesophageal reflux disease, esophagitis presence not specified     3. Morbid obesity ([address]     4. Insulin resistance     5. Mild intermittent asthma without complication     6. Prediabetes     7. Lymphedema         Plan:      Track food and weight. Return to clinic as per group medical appointment schedule. Follow-up:  Return in about 1 week (around 9/17/2020). Orders:  No orders of the defined types were placed in this encounter. Prescriptions:  No orders of the defined types were placed in this encounter.       Electronically signed by:  Freddy Szymanski CNP

## 2020-09-17 ENCOUNTER — OFFICE VISIT (OUTPATIENT)
Dept: BARIATRICS/WEIGHT MGMT | Age: 45
End: 2020-09-17
Payer: COMMERCIAL

## 2020-09-17 VITALS
SYSTOLIC BLOOD PRESSURE: 124 MMHG | HEIGHT: 60 IN | HEART RATE: 74 BPM | DIASTOLIC BLOOD PRESSURE: 82 MMHG | WEIGHT: 293 LBS | BODY MASS INDEX: 57.52 KG/M2

## 2020-09-17 PROCEDURE — 99213 OFFICE O/P EST LOW 20 MIN: CPT | Performed by: NURSE PRACTITIONER

## 2020-09-17 NOTE — PROGRESS NOTES
Group Lifestyle Balance Follow-up Progress Note      Subjective     Patient is here for group medical appointment for Group Lifestyle Balance weight management program follow-up for the chronic conditions of HTN, Prediabetes, Asthma, Urinary Incontinence, GERD, Cervical Stenosis, Lymphedema. Patient continues on the program and tolerating well. Total weight loss of 4 lbs. No current issues. 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     Past Surgical History:  Past Surgical History:   Procedure Laterality Date    FOOT SURGERY      TONSILLECTOMY         Family History:  Family History   Problem Relation Age of Onset    High Blood Pressure Mother     Lung Cancer Mother     Cancer Mother     High Blood Pressure Father     Diabetes Father     COPD Father     High Blood Pressure Brother     Thyroid Cancer Brother     Cancer Brother     Heart Disease Maternal Grandmother     Depression Other     High Blood Pressure Other        Social History:  Social History     Socioeconomic History    Marital status: Single     Spouse name: Not on file    Number of children: Not on file    Years of education: Not on file    Highest education level: Not on file   Occupational History    Not on file   Social Needs    Financial resource strain: Not on file    Food insecurity Worry: Not on file     Inability: Not on file    Transportation needs     Medical: Not on file     Non-medical: Not on file   Tobacco Use    Smoking status: Never Smoker    Smokeless tobacco: Never Used   Substance and Sexual Activity    Alcohol use: No    Drug use: No    Sexual activity: Yes     Partners: Male   Lifestyle    Physical activity     Days per week: Not on file     Minutes per session: Not on file    Stress: Not on file   Relationships    Social connections     Talks on phone: Not on file     Gets together: Not on file     Attends Jainism service: Not on file     Active member of club or organization: Not on file     Attends meetings of clubs or organizations: Not on file     Relationship status: Not on file    Intimate partner violence     Fear of current or ex partner: Not on file     Emotionally abused: Not on file     Physically abused: Not on file     Forced sexual activity: Not on file   Other Topics Concern    Not on file   Social History Narrative    Not on file       Current Medications:  Current Outpatient Medications   Medication Sig Dispense Refill    ibuprofen (ADVIL;MOTRIN) 800 MG tablet Take 1 tablet by mouth every 8 hours as needed for Pain 90 tablet 1    montelukast (SINGULAIR) 10 MG tablet Take 1 tablet by mouth daily 90 tablet 1    pioglitazone (ACTOS) 15 MG tablet TAKE 1 TABLET BY MOUTH ONE TIME A DAY 90 tablet 1    furosemide (LASIX) 20 MG tablet Take 1 tablet by mouth 2 times daily as needed (edema) 180 tablet 1    famotidine (PEPCID) 40 MG tablet Take 1 tablet by mouth 2 times daily 180 tablet 1    lisinopril (PRINIVIL;ZESTRIL) 20 MG tablet TAKE 1 TABLET BY MOUTH ONE TIME A DAY 90 tablet 1    cetirizine (ZYRTEC) 10 MG tablet TAKE 1 TABLET BY MOUTH ONE TIME A DAY 90 tablet 1    albuterol sulfate  (90 Base) MCG/ACT inhaler Inhale 2 puffs into the lungs 4 times daily as needed for Wheezing 1 Inhaler 0    nystatin (MYCOSTATIN) 981517 UNIT/GM powder APPLY EXTERNALLY THREE TIMES A DAY  60 g 0     Current Facility-Administered Medications   Medication Dose Route Frequency Provider Last Rate Last Dose    methylPREDNISolone acetate (DEPO-MEDROL) injection 80 mg  80 mg Intramuscular Once Chalice Gnosticism, DO           Vital Signs:  /82 (Site: Right Upper Arm, Position: Sitting, Cuff Size: Large Adult)   Pulse 74   Ht 5' (1.524 m)   Wt (!) 336 lb (152.4 kg)   BMI 65.62 kg/m²     BMI/Height/Weight:  Body mass index is 65.62 kg/m². Review of Systems  Constitutional: Weight loss      Objective:      Physical Exam   Vital signs reviewed. General Appearance: Well-developed and well-nourished. No acute distress. Skin: Warm, dry. Head: Normocephalic. Pulmonary/Chest: Normal respiratory effort. Musculoskeletal: Movement x4. Neurological:  Alert and oriented. Individual goal for this encounter:  Get off meds or take less meds, help daughter lose weight also, lessen back/knee pain, be more active with grandkids, learn to eat better. X? Vital signs reviewed and discussed with patient. ? Labs/EKG reviewed and discussed with patient. ? Blood sugar log reviewed and discussed with patient. ? Physical activity discussed. ? Medication changes recommended. ? Smoking cessation discussed. X? Specific questions/concerns addressed. Specific group medical question(s) addressed in this encounter:  Discussion about osteoarthritis. Group discussion topic for this encounter:     ? Lou Quails   ? Be a Fat & Calorie    ? Healthy Eating   ? Move Those Muscles   ? Tip the Calorie Balance   ? Take charge of What's Around You   ? Problem Solving   ? Step Up Your Physical Activity Plan   ? Manage Slips and Self-Defeating Thoughts   ? [address]  X ? Make Social Cues Work for Dee Stabs   ? Ways to Stay Motivated   ? Preparing for Long Term Self-Management   ? Take Charge of Your Lifestyle   ?  Mindful Eating, Mindful Movement   ? Manage Your Stress   ? Sit Less for Health   ? More Volume, Fewer Calories   ? Stay Active   ? Balance Your Thoughts   ? Heart Health    ? Looking Back and Looking Forward    Total time:  90 minutes, greater than 50% of visit spent in group counseling/education. Assessment:       Diagnosis Orders   1. Essential hypertension, benign     2. Gastroesophageal reflux disease, esophagitis presence not specified     3. Morbid obesity ([address]     4. Mild intermittent asthma without complication     5. Prediabetes     6. Lymphedema         Plan:      Track food and weight. Return to clinic as per group medical appointment schedule. Follow-up:  Return in about 1 week (around 9/24/2020). Orders:  No orders of the defined types were placed in this encounter. Prescriptions:  No orders of the defined types were placed in this encounter.       Electronically signed by:  May Mckeon CNP

## 2020-09-24 ENCOUNTER — OFFICE VISIT (OUTPATIENT)
Dept: BARIATRICS/WEIGHT MGMT | Age: 45
End: 2020-09-24
Payer: COMMERCIAL

## 2020-09-24 VITALS
BODY MASS INDEX: 57.52 KG/M2 | SYSTOLIC BLOOD PRESSURE: 122 MMHG | DIASTOLIC BLOOD PRESSURE: 70 MMHG | HEART RATE: 76 BPM | WEIGHT: 293 LBS | HEIGHT: 60 IN

## 2020-09-24 PROCEDURE — 99213 OFFICE O/P EST LOW 20 MIN: CPT | Performed by: NURSE PRACTITIONER

## 2020-09-24 NOTE — PROGRESS NOTES
Worry: Not on file     Inability: Not on file    Transportation needs     Medical: Not on file     Non-medical: Not on file   Tobacco Use    Smoking status: Never Smoker    Smokeless tobacco: Never Used   Substance and Sexual Activity    Alcohol use: No    Drug use: No    Sexual activity: Yes     Partners: Male   Lifestyle    Physical activity     Days per week: Not on file     Minutes per session: Not on file    Stress: Not on file   Relationships    Social connections     Talks on phone: Not on file     Gets together: Not on file     Attends Orthodoxy service: Not on file     Active member of club or organization: Not on file     Attends meetings of clubs or organizations: Not on file     Relationship status: Not on file    Intimate partner violence     Fear of current or ex partner: Not on file     Emotionally abused: Not on file     Physically abused: Not on file     Forced sexual activity: Not on file   Other Topics Concern    Not on file   Social History Narrative    Not on file       Current Medications:  Current Outpatient Medications   Medication Sig Dispense Refill    ibuprofen (ADVIL;MOTRIN) 800 MG tablet Take 1 tablet by mouth every 8 hours as needed for Pain 90 tablet 1    montelukast (SINGULAIR) 10 MG tablet Take 1 tablet by mouth daily 90 tablet 1    pioglitazone (ACTOS) 15 MG tablet TAKE 1 TABLET BY MOUTH ONE TIME A DAY 90 tablet 1    furosemide (LASIX) 20 MG tablet Take 1 tablet by mouth 2 times daily as needed (edema) 180 tablet 1    famotidine (PEPCID) 40 MG tablet Take 1 tablet by mouth 2 times daily 180 tablet 1    lisinopril (PRINIVIL;ZESTRIL) 20 MG tablet TAKE 1 TABLET BY MOUTH ONE TIME A DAY 90 tablet 1    cetirizine (ZYRTEC) 10 MG tablet TAKE 1 TABLET BY MOUTH ONE TIME A DAY 90 tablet 1    albuterol sulfate  (90 Base) MCG/ACT inhaler Inhale 2 puffs into the lungs 4 times daily as needed for Wheezing 1 Inhaler 0    nystatin (MYCOSTATIN) 560155 UNIT/GM powder APPLY EXTERNALLY THREE TIMES A DAY  60 g 0     Current Facility-Administered Medications   Medication Dose Route Frequency Provider Last Rate Last Dose    methylPREDNISolone acetate (DEPO-MEDROL) injection 80 mg  80 mg Intramuscular Once Norris Quiroz,            Vital Signs:  /70 (Site: Right Upper Arm, Position: Sitting, Cuff Size: Large Adult)   Pulse 76   Ht 5' (1.524 m)   Wt (!) 338 lb (153.3 kg)   BMI 66.01 kg/m²     BMI/Height/Weight:  Body mass index is 66.01 kg/m². Review of Systems  Constitutional: Weight loss      Objective:      Physical Exam   Vital signs reviewed. General Appearance: Well-developed and well-nourished. No acute distress. Skin: Warm, dry. Head: Normocephalic. Pulmonary/Chest: Normal respiratory effort. Musculoskeletal: Movement x4. Neurological:  Alert and oriented. Individual goal for this encounter:  Get off meds or take less meds, help daughter lose weight also, lessen back/knee pain, be more active with grandkids, learn to eat better. X? Vital signs reviewed and discussed with patient. ? Labs/EKG reviewed and discussed with patient. ? Blood sugar log reviewed and discussed with patient. ? Physical activity discussed. ? Medication changes recommended. ? Smoking cessation discussed. X? Specific questions/concerns addressed. Specific group medical question(s) addressed in this encounter:  Discussion about back pain. Group discussion topic for this encounter:     ? Junious Jumbo   ? Be a Fat & Calorie    ? Healthy Eating   ? Move Those Muscles   ? Tip the Calorie Balance   ? Take charge of What's Around You   ? Problem Solving   ? Step Up Your Physical Activity Plan   ? Manage Slips and Self-Defeating Thoughts   ? [address]   ? Make Social Cues Work for Eliot Griffiths  X ? Ways to Stay Motivated   ? Preparing for Long Term Self-Management   ? Take Charge of Your Lifestyle   ?  Mindful Eating, Mindful Movement   ? Manage Your Stress   ? Sit Less for Health   ? More Volume, Fewer Calories   ? Stay Active   ? Balance Your Thoughts   ? Heart Health    ? Looking Back and Looking Forward    Total time:  90 minutes, greater than 50% of visit spent in group counseling/education. Assessment:       Diagnosis Orders   1. Essential hypertension, benign     2. Gastroesophageal reflux disease, esophagitis presence not specified     3. Morbid obesity ([address]     4. Insulin resistance     5. Mild intermittent asthma without complication     6. Prediabetes     7. Lymphedema         Plan:      Track food and weight. Return to clinic as per group medical appointment schedule. Follow-up:  Return in about 1 week (around 10/1/2020). Orders:  No orders of the defined types were placed in this encounter. Prescriptions:  No orders of the defined types were placed in this encounter.       Electronically signed by:  Belkis Cesar CNP

## 2020-10-08 ENCOUNTER — OFFICE VISIT (OUTPATIENT)
Dept: BARIATRICS/WEIGHT MGMT | Age: 45
End: 2020-10-08
Payer: COMMERCIAL

## 2020-10-08 VITALS
HEART RATE: 74 BPM | SYSTOLIC BLOOD PRESSURE: 126 MMHG | WEIGHT: 293 LBS | HEIGHT: 60 IN | DIASTOLIC BLOOD PRESSURE: 78 MMHG | BODY MASS INDEX: 57.52 KG/M2

## 2020-10-08 PROCEDURE — 99213 OFFICE O/P EST LOW 20 MIN: CPT | Performed by: NURSE PRACTITIONER

## 2020-10-08 NOTE — PROGRESS NOTES
Group Lifestyle Balance Follow-up Progress Note      Subjective     Patient is here for group medical appointment for Group Lifestyle Balance weight management program follow-up for the chronic conditions of HTN, Prediabetes, Asthma, Urinary Incontinence, GERD, Cervical Stenosis, Lymphedema. Patient continues on the program and tolerating well. Total weight loss of 4 lbs. No current issues. 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     Past Surgical History:  Past Surgical History:   Procedure Laterality Date    FOOT SURGERY      TONSILLECTOMY         Family History:  Family History   Problem Relation Age of Onset    High Blood Pressure Mother     Lung Cancer Mother     Cancer Mother     High Blood Pressure Father     Diabetes Father     COPD Father     High Blood Pressure Brother     Thyroid Cancer Brother     Cancer Brother     Heart Disease Maternal Grandmother     Depression Other     High Blood Pressure Other        Social History:  Social History     Socioeconomic History    Marital status: Single     Spouse name: Not on file    Number of children: Not on file    Years of education: Not on file    Highest education level: Not on file   Occupational History    Not on file   Social Needs    Financial resource strain: Not on file    Food insecurity Worry: Not on file     Inability: Not on file    Transportation needs     Medical: Not on file     Non-medical: Not on file   Tobacco Use    Smoking status: Never Smoker    Smokeless tobacco: Never Used   Substance and Sexual Activity    Alcohol use: No    Drug use: No    Sexual activity: Yes     Partners: Male   Lifestyle    Physical activity     Days per week: Not on file     Minutes per session: Not on file    Stress: Not on file   Relationships    Social connections     Talks on phone: Not on file     Gets together: Not on file     Attends Alevism service: Not on file     Active member of club or organization: Not on file     Attends meetings of clubs or organizations: Not on file     Relationship status: Not on file    Intimate partner violence     Fear of current or ex partner: Not on file     Emotionally abused: Not on file     Physically abused: Not on file     Forced sexual activity: Not on file   Other Topics Concern    Not on file   Social History Narrative    Not on file       Current Medications:  Current Outpatient Medications   Medication Sig Dispense Refill    ibuprofen (ADVIL;MOTRIN) 800 MG tablet Take 1 tablet by mouth every 8 hours as needed for Pain 90 tablet 1    montelukast (SINGULAIR) 10 MG tablet Take 1 tablet by mouth daily 90 tablet 1    pioglitazone (ACTOS) 15 MG tablet TAKE 1 TABLET BY MOUTH ONE TIME A DAY 90 tablet 1    furosemide (LASIX) 20 MG tablet Take 1 tablet by mouth 2 times daily as needed (edema) 180 tablet 1    famotidine (PEPCID) 40 MG tablet Take 1 tablet by mouth 2 times daily 180 tablet 1    lisinopril (PRINIVIL;ZESTRIL) 20 MG tablet TAKE 1 TABLET BY MOUTH ONE TIME A DAY 90 tablet 1    cetirizine (ZYRTEC) 10 MG tablet TAKE 1 TABLET BY MOUTH ONE TIME A DAY 90 tablet 1    albuterol sulfate  (90 Base) MCG/ACT inhaler Inhale 2 puffs into the lungs 4 times daily as needed for Wheezing 1 Inhaler 0    nystatin (MYCOSTATIN) [id number] UNIT/GM powder APPLY EXTERNALLY THREE TIMES A DAY  60 g 0     Current Facility-Administered Medications   Medication Dose Route Frequency Provider Last Rate Last Dose    methylPREDNISolone acetate (DEPO-MEDROL) injection 80 mg  80 mg Intramuscular Once Brand Rings, DO           Vital Signs:  /78 (Site: Right Upper Arm, Position: Sitting, Cuff Size: Large Adult)   Pulse 74   Ht 5' (1.524 m)   Wt (!) 336 lb (152.4 kg)   BMI 65.62 kg/m²     BMI/Height/Weight:  Body mass index is 65.62 kg/m². Review of Systems  Constitutional: Weight loss      Objective:      Physical Exam   Vital signs reviewed. General Appearance: Well-developed and well-nourished. No acute distress. Skin: Warm, dry. Head: Normocephalic. Pulmonary/Chest: Normal respiratory effort. Musculoskeletal: Movement x4. Neurological:  Alert and oriented. Individual goal for this encounter:  Get off meds or take less meds, help daughter lose weight also, lessen back/knee pain, be more active with grandkids, learn to eat better. X? Vital signs reviewed and discussed with patient. ? Labs/EKG reviewed and discussed with patient. ? Blood sugar log reviewed and discussed with patient. ? Physical activity discussed. ? Medication changes recommended. ? Smoking cessation discussed. X? Specific questions/concerns addressed. Specific group medical question(s) addressed in this encounter:  Discussion about GERD. Group discussion topic for this encounter:     ? Damaris Fisherman   ? Be a Fat & Calorie   X ? Healthy Eating   ? Move Those Muscles   ? Tip the Calorie Balance   ? Take charge of What's Around You   ? Problem Solving   ? Step Up Your Physical Activity Plan   ? Manage Slips and Self-Defeating Thoughts   ? [address]   ? Make Social Cues Work for Ernesto Esquivela   ? Ways to Stay Motivated   ? Preparing for Long Term Self-Management   ? Take Charge of Your Lifestyle   ?  Mindful Eating, Mindful Movement   ? Manage Your Stress   ? Sit Less for Health   ? More Volume, Fewer Calories   ? Stay Active   ? Balance Your Thoughts   ? Heart Health    ? Looking Back and Looking Forward    Total time:  90 minutes, greater than 50% of visit spent in group counseling/education. Assessment:       Diagnosis Orders   1. Essential hypertension, benign     2. Gastroesophageal reflux disease, unspecified whether esophagitis present     3. Insulin resistance     4. Stress incontinence     5. Mild intermittent asthma without complication     6. Prediabetes     7. Lymphedema     8. Morbid obesity ([address]         Plan:      Track food and weight. Return to clinic as per group medical appointment schedule. Follow-up:  Return in about 1 week (around 10/15/2020). Orders:  No orders of the defined types were placed in this encounter. Prescriptions:  No orders of the defined types were placed in this encounter.       Electronically signed by:  Harry Garcia CNP

## 2020-10-15 ENCOUNTER — OFFICE VISIT (OUTPATIENT)
Dept: BARIATRICS/WEIGHT MGMT | Age: 45
End: 2020-10-15
Payer: COMMERCIAL

## 2020-10-15 VITALS
DIASTOLIC BLOOD PRESSURE: 70 MMHG | WEIGHT: 293 LBS | BODY MASS INDEX: 57.52 KG/M2 | HEART RATE: 68 BPM | SYSTOLIC BLOOD PRESSURE: 122 MMHG | HEIGHT: 60 IN

## 2020-10-15 PROCEDURE — 99213 OFFICE O/P EST LOW 20 MIN: CPT | Performed by: NURSE PRACTITIONER

## 2020-10-15 NOTE — PROGRESS NOTES
Group Lifestyle Balance Follow-up Progress Note      Subjective     Patient is here for group medical appointment for Group Lifestyle Balance weight management program follow-up for the chronic conditions of HTN, Prediabetes, Asthma, Urinary Incontinence, GERD, Cervical Stenosis, Lymphedema. Patient continues on the program and tolerating well. Total weight loss of 6 lbs. No current issues. 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     Past Surgical History:  Past Surgical History:   Procedure Laterality Date    FOOT SURGERY      TONSILLECTOMY         Family History:  Family History   Problem Relation Age of Onset    High Blood Pressure Mother     Lung Cancer Mother     Cancer Mother     High Blood Pressure Father     Diabetes Father     COPD Father     High Blood Pressure Brother     Thyroid Cancer Brother     Cancer Brother     Heart Disease Maternal Grandmother     Depression Other     High Blood Pressure Other        Social History:  Social History     Socioeconomic History    Marital status: Single     Spouse name: Not on file    Number of children: Not on file    Years of education: Not on file    Highest education level: Not on file   Occupational History    Not on file   Social Needs    Financial resource strain: Not on file    Food insecurity Worry: Not on file     Inability: Not on file    Transportation needs     Medical: Not on file     Non-medical: Not on file   Tobacco Use    Smoking status: Never Smoker    Smokeless tobacco: Never Used   Substance and Sexual Activity    Alcohol use: No    Drug use: No    Sexual activity: Yes     Partners: Male   Lifestyle    Physical activity     Days per week: Not on file     Minutes per session: Not on file    Stress: Not on file   Relationships    Social connections     Talks on phone: Not on file     Gets together: Not on file     Attends Taoist service: Not on file     Active member of club or organization: Not on file     Attends meetings of clubs or organizations: Not on file     Relationship status: Not on file    Intimate partner violence     Fear of current or ex partner: Not on file     Emotionally abused: Not on file     Physically abused: Not on file     Forced sexual activity: Not on file   Other Topics Concern    Not on file   Social History Narrative    Not on file       Current Medications:  Current Outpatient Medications   Medication Sig Dispense Refill    ibuprofen (ADVIL;MOTRIN) 800 MG tablet Take 1 tablet by mouth every 8 hours as needed for Pain 90 tablet 1    montelukast (SINGULAIR) 10 MG tablet Take 1 tablet by mouth daily 90 tablet 1    pioglitazone (ACTOS) 15 MG tablet TAKE 1 TABLET BY MOUTH ONE TIME A DAY 90 tablet 1    furosemide (LASIX) 20 MG tablet Take 1 tablet by mouth 2 times daily as needed (edema) 180 tablet 1    famotidine (PEPCID) 40 MG tablet Take 1 tablet by mouth 2 times daily 180 tablet 1    lisinopril (PRINIVIL;ZESTRIL) 20 MG tablet TAKE 1 TABLET BY MOUTH ONE TIME A DAY 90 tablet 1    cetirizine (ZYRTEC) 10 MG tablet TAKE 1 TABLET BY MOUTH ONE TIME A DAY 90 tablet 1    albuterol sulfate  (90 Base) MCG/ACT inhaler Inhale 2 puffs into the lungs 4 times daily as needed for Wheezing 1 Inhaler 0    nystatin (MYCOSTATIN) 164049 UNIT/GM powder APPLY EXTERNALLY THREE TIMES A DAY  60 g 0     Current Facility-Administered Medications   Medication Dose Route Frequency Provider Last Rate Last Dose    methylPREDNISolone acetate (DEPO-MEDROL) injection 80 mg  80 mg Intramuscular Once Sam Peasant, DO           Vital Signs:  /70 (Site: Right Upper Arm, Position: Sitting, Cuff Size: Large Adult)   Pulse 68   Ht 5' (1.524 m)   Wt (!) 334 lb (151.5 kg)   BMI 65.23 kg/m²     BMI/Height/Weight:  Body mass index is 65.23 kg/m². Review of Systems  Constitutional: Weight loss      Objective:      Physical Exam   Vital signs reviewed. General Appearance: Well-developed and well-nourished. No acute distress. Skin: Warm, dry. Head: Normocephalic. Pulmonary/Chest: Normal respiratory effort. Musculoskeletal: Movement x4. Neurological:  Alert and oriented. Individual goal for this encounter:  Get off meds or take less meds, help daughter lose weight also, lessen back/knee pain, be more active with grandkids, learn to eat better. X? Vital signs reviewed and discussed with patient. ? Labs/EKG reviewed and discussed with patient. ? Blood sugar log reviewed and discussed with patient. ? Physical activity discussed. ? Medication changes recommended. ? Smoking cessation discussed. X? Specific questions/concerns addressed. Specific group medical question(s) addressed in this encounter:  Discussion about depression. Group discussion topic for this encounter:     ? Daneil Manas   ? Be a Fat & Calorie    ? Healthy Eating  X ? Move Those Muscles   ? Tip the Calorie Balance   ? Take charge of What's Around You   ? Problem Solving   ? Step Up Your Physical Activity Plan   ? Manage Slips and Self-Defeating Thoughts   ? [address]   ? Make Social Cues Work for [place]   ? Ways to Stay Motivated   ? Preparing for Long Term Self-Management   ? Take Charge of Your Lifestyle   ?  Mindful Eating, Mindful

## 2020-11-10 ENCOUNTER — OFFICE VISIT (OUTPATIENT)
Dept: BARIATRICS/WEIGHT MGMT | Age: 45
End: 2020-11-10
Payer: COMMERCIAL

## 2020-11-10 VITALS
BODY MASS INDEX: 57.52 KG/M2 | HEIGHT: 60 IN | DIASTOLIC BLOOD PRESSURE: 76 MMHG | SYSTOLIC BLOOD PRESSURE: 128 MMHG | HEART RATE: 74 BPM | WEIGHT: 293 LBS

## 2020-11-10 PROCEDURE — 99213 OFFICE O/P EST LOW 20 MIN: CPT | Performed by: NURSE PRACTITIONER

## 2020-11-10 NOTE — PROGRESS NOTES
Group Lifestyle Balance Follow-up Progress Note      Subjective     Patient is here for group medical appointment for Group Lifestyle Balance weight management program follow-up for the chronic conditions of HTN, Prediabetes, Asthma, Urinary Incontinence, GERD, Cervical Stenosis, Lymphedema. Patient continues on the program and tolerating well. Total weight loss of 4 lbs. No current issues. 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     Past Surgical History:  Past Surgical History:   Procedure Laterality Date    FOOT SURGERY      TONSILLECTOMY         Family History:  Family History   Problem Relation Age of Onset    High Blood Pressure Mother     Lung Cancer Mother     Cancer Mother     High Blood Pressure Father     Diabetes Father     COPD Father     High Blood Pressure Brother     Thyroid Cancer Brother     Cancer Brother     Heart Disease Maternal Grandmother     Depression Other     High Blood Pressure Other        Social History:  Social History     Socioeconomic History    Marital status: Single     Spouse name: Not on file    Number of children: Not on file    Years of education: Not on file    Highest education level: Not on file   Occupational History    Not on file   Social Needs    Financial resource strain: Not on file    Food insecurity Worry: Not on file     Inability: Not on file    Transportation needs     Medical: Not on file     Non-medical: Not on file   Tobacco Use    Smoking status: Never Smoker    Smokeless tobacco: Never Used   Substance and Sexual Activity    Alcohol use: No    Drug use: No    Sexual activity: Yes     Partners: Male   Lifestyle    Physical activity     Days per week: Not on file     Minutes per session: Not on file    Stress: Not on file   Relationships    Social connections     Talks on phone: Not on file     Gets together: Not on file     Attends Yazidi service: Not on file     Active member of club or organization: Not on file     Attends meetings of clubs or organizations: Not on file     Relationship status: Not on file    Intimate partner violence     Fear of current or ex partner: Not on file     Emotionally abused: Not on file     Physically abused: Not on file     Forced sexual activity: Not on file   Other Topics Concern    Not on file   Social History Narrative    Not on file       Current Medications:  Current Outpatient Medications   Medication Sig Dispense Refill    ibuprofen (ADVIL;MOTRIN) 800 MG tablet Take 1 tablet by mouth every 8 hours as needed for Pain 90 tablet 1    montelukast (SINGULAIR) 10 MG tablet Take 1 tablet by mouth daily 90 tablet 1    pioglitazone (ACTOS) 15 MG tablet TAKE 1 TABLET BY MOUTH ONE TIME A DAY 90 tablet 1    furosemide (LASIX) 20 MG tablet Take 1 tablet by mouth 2 times daily as needed (edema) 180 tablet 1    famotidine (PEPCID) 40 MG tablet Take 1 tablet by mouth 2 times daily 180 tablet 1    lisinopril (PRINIVIL;ZESTRIL) 20 MG tablet TAKE 1 TABLET BY MOUTH ONE TIME A DAY 90 tablet 1    cetirizine (ZYRTEC) 10 MG tablet TAKE 1 TABLET BY MOUTH ONE TIME A DAY 90 tablet 1    albuterol sulfate  (90 Base) MCG/ACT inhaler Inhale 2 puffs into the lungs 4 times daily as needed for Wheezing 1 Inhaler 0    nystatin (MYCOSTATIN) 788012 UNIT/GM powder APPLY EXTERNALLY THREE TIMES A DAY  60 g 0     Current Facility-Administered Medications   Medication Dose Route Frequency Provider Last Rate Last Dose    methylPREDNISolone acetate (DEPO-MEDROL) injection 80 mg  80 mg Intramuscular Once Alice Trell, DO           Vital Signs:  /76 (Site: Right Upper Arm, Position: Sitting, Cuff Size: Large Adult)   Pulse 74   Ht 5' (1.524 m)   Wt (!) 336 lb (152.4 kg)   BMI 65.62 kg/m²     BMI/Height/Weight:  Body mass index is 65.62 kg/m². Review of Systems  Constitutional: Weight loss      Objective:      Physical Exam   Vital signs reviewed. General Appearance: Well-developed and well-nourished. No acute distress. Skin: Warm, dry. Head: Normocephalic. Pulmonary/Chest: Normal respiratory effort. Musculoskeletal: Movement x4. Neurological:  Alert and oriented. Individual goal for this encounter:  Get off meds or take less meds, help daughter lose weight also, lessen back/knee pain, be more active with grandkids, learn to eat better. X? Vital signs reviewed and discussed with patient. ? Labs/EKG reviewed and discussed with patient. ? Blood sugar log reviewed and discussed with patient. ? Physical activity discussed. ? Medication changes recommended. ? Smoking cessation discussed. X? Specific questions/concerns addressed. Specific group medical question(s) addressed in this encounter:  Discussion about metabolism. Group discussion topic for this encounter:     ? Skyler Loth   ? Be a Fat & Calorie    ? Healthy Eating   ? Move Those Muscles   ? Tip the Calorie Balance   ? Take charge of What's Around You   ? Problem Solving   ? Step Up Your Physical Activity Plan   ? Manage Slips and Self-Defeating Thoughts   ? [address]   ? Make Social Cues Work for Julieth Gordon   ? Ways to Stay Motivated   ? Preparing for Long Term Self-Management   ? Take Charge of Your Lifestyle   ?  Mindful Eating, Mindful Movement   ? Manage Your Stress   ? Sit Less for Health   ? More Volume, Fewer Calories   ? Stay Active  X ? Balance Your Thoughts   ? Heart Health    ? Looking Back and Looking Forward    Total time:  90 minutes, greater than 50% of visit spent in group counseling/education. Assessment:       Diagnosis Orders   1. Essential hypertension, benign     2. Gastroesophageal reflux disease, unspecified whether esophagitis present     3. Morbid obesity ([address]     4. Prediabetes     5. Lymphedema     6. Mild intermittent asthma without complication     7. Stress incontinence         Plan:      Track food and weight. Return to clinic as per group medical appointment schedule. Follow-up:  Return in about 1 month (around 12/10/2020). Orders:  No orders of the defined types were placed in this encounter. Prescriptions:  No orders of the defined types were placed in this encounter.       Electronically signed by:  Bernadette Lam CNP

## 2021-01-12 ENCOUNTER — OFFICE VISIT (OUTPATIENT)
Dept: BARIATRICS/WEIGHT MGMT | Age: 46
End: 2021-01-12
Payer: COMMERCIAL

## 2021-01-12 VITALS
DIASTOLIC BLOOD PRESSURE: 70 MMHG | HEART RATE: 74 BPM | HEIGHT: 60 IN | BODY MASS INDEX: 57.52 KG/M2 | WEIGHT: 293 LBS | SYSTOLIC BLOOD PRESSURE: 122 MMHG

## 2021-01-12 DIAGNOSIS — J45.20 MILD INTERMITTENT ASTHMA WITHOUT COMPLICATION: ICD-10-CM

## 2021-01-12 DIAGNOSIS — K21.9 GASTROESOPHAGEAL REFLUX DISEASE, UNSPECIFIED WHETHER ESOPHAGITIS PRESENT: ICD-10-CM

## 2021-01-12 DIAGNOSIS — E66.01 MORBID OBESITY (HCC): ICD-10-CM

## 2021-01-12 DIAGNOSIS — I89.0 LYMPHEDEMA: ICD-10-CM

## 2021-01-12 DIAGNOSIS — I10 ESSENTIAL HYPERTENSION, BENIGN: Primary | ICD-10-CM

## 2021-01-12 DIAGNOSIS — R73.03 PREDIABETES: ICD-10-CM

## 2021-01-12 PROCEDURE — 99213 OFFICE O/P EST LOW 20 MIN: CPT | Performed by: NURSE PRACTITIONER

## 2021-01-12 NOTE — PROGRESS NOTES
Group Lifestyle Balance Follow-up Progress Note      Subjective     Patient is here for group medical appointment for Group Lifestyle Balance weight management program follow-up for the chronic conditions of HTN, Prediabetes, Asthma, Urinary Incontinence, GERD, Cervical Stenosis, Lymphedema. Patient continues on the program and tolerating well. Total weight gain of 9 lbs. No current issues. 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     Past Surgical History:  Past Surgical History:   Procedure Laterality Date    FOOT SURGERY      TONSILLECTOMY         Family History:  Family History   Problem Relation Age of Onset    High Blood Pressure Mother     Lung Cancer Mother     Cancer Mother     High Blood Pressure Father     Diabetes Father     COPD Father     High Blood Pressure Brother     Thyroid Cancer Brother     Cancer Brother     Heart Disease Maternal Grandmother     Depression Other     High Blood Pressure Other        Social History:  Social History     Socioeconomic History    Marital status: Single     Spouse name: Not on file    Number of children: Not on file    Years of education: Not on file    Highest education level: Not on file   Occupational History    Not on file   Social Needs    Financial resource strain: Not on file    Food insecurity Worry: Not on file     Inability: Not on file    Transportation needs     Medical: Not on file     Non-medical: Not on file   Tobacco Use    Smoking status: Never Smoker    Smokeless tobacco: Never Used   Substance and Sexual Activity    Alcohol use: No    Drug use: No    Sexual activity: Yes     Partners: Male   Lifestyle    Physical activity     Days per week: Not on file     Minutes per session: Not on file    Stress: Not on file   Relationships    Social connections     Talks on phone: Not on file     Gets together: Not on file     Attends [employer] service: Not on file     Active member of club or organization: Not on file     Attends meetings of clubs or organizations: Not on file     Relationship status: Not on file    Intimate partner violence     Fear of current or ex partner: Not on file     Emotionally abused: Not on file     Physically abused: Not on file     Forced sexual activity: Not on file   Other Topics Concern    Not on file   Social History Narrative    Not on file       Current Medications:  Current Outpatient Medications   Medication Sig Dispense Refill    ibuprofen (ADVIL;MOTRIN) 800 MG tablet Take 1 tablet by mouth every 8 hours as needed for Pain 90 tablet 1    montelukast (SINGULAIR) 10 MG tablet Take 1 tablet by mouth daily 90 tablet 1    pioglitazone (ACTOS) 15 MG tablet TAKE 1 TABLET BY MOUTH ONE TIME A DAY 90 tablet 1    furosemide (LASIX) 20 MG tablet Take 1 tablet by mouth 2 times daily as needed (edema) 180 tablet 1    famotidine (PEPCID) 40 MG tablet Take 1 tablet by mouth 2 times daily 180 tablet 1    lisinopril (PRINIVIL;ZESTRIL) 20 MG tablet TAKE 1 TABLET BY MOUTH ONE TIME A DAY 90 tablet 1    cetirizine (ZYRTEC) 10 MG tablet TAKE 1 TABLET BY MOUTH ONE TIME A DAY 90 tablet 1    albuterol sulfate  (90 Base) MCG/ACT inhaler Inhale 2 puffs into the lungs 4 times daily as needed for Wheezing 1 Inhaler 0    nystatin (MYCOSTATIN) 097887 UNIT/GM powder APPLY EXTERNALLY THREE TIMES A DAY  60 g 0     Current Facility-Administered Medications   Medication Dose Route Frequency Provider Last Rate Last Admin    methylPREDNISolone acetate (DEPO-MEDROL) injection 80 mg  80 mg Intramuscular Once Umer Backers, DO           Vital Signs:  /70 (Site: Right Upper Arm, Position: Sitting, Cuff Size: Large Adult)   Pulse 74   Ht 5' (1.524 m)   Wt (!) 349 lb (158.3 kg)   BMI 68.16 kg/m²     BMI/Height/Weight:  Body mass index is 68.16 kg/m². Review of Systems  Constitutional: Weight gain      Objective:      Physical Exam   Vital signs reviewed. General Appearance: Well-developed and well-nourished. No acute distress. Skin: Warm, dry. Head: Normocephalic. Pulmonary/Chest: Normal respiratory effort. Musculoskeletal: Movement x4. Neurological:  Alert and oriented. Individual goal for this encounter:  Get off meds or take less meds, help daughter lose weight also, lessen back/knee pain, be more active with grandkids, learn to eat better. X? Vital signs reviewed and discussed with patient. ? Labs/EKG reviewed and discussed with patient. ? Blood sugar log reviewed and discussed with patient. ? Physical activity discussed. ? Medication changes recommended. ? Smoking cessation discussed. X? Specific questions/concerns addressed. Specific group medical question(s) addressed in this encounter:  Discussion about cancer. Group discussion topic for this encounter:     ? Anell Bilis   ? Be a Fat & Calorie    ? Healthy Eating   ? Move Those Muscles   ? Tip the Calorie Balance   ? Take charge of What's Around You   ? Problem Solving   ? Step Up Your Physical Activity Plan   ? Manage Slips and Self-Defeating Thoughts   ? [address]   ? Make Social Cues Work for Liat Ruiz   ? Ways to Stay Motivated   ? Preparing for Long Term Self-Management   ? Take Charge of Your Lifestyle   ?  Mindful Eating, Mindful Movement   ? Manage Your Stress  X ? Sit Less for Health   ? More Volume, Fewer Calories   ? Stay Active   ? Balance Your Thoughts   ? Heart Health    ? Looking Back and Looking Forward    Total time:  90 minutes, greater than 50% of visit spent in group counseling/education. Assessment:       Diagnosis Orders   1. Essential hypertension, benign     2. Gastroesophageal reflux disease, unspecified whether esophagitis present     3. Morbid obesity ([address]     4. Mild intermittent asthma without complication     5. Prediabetes     6. Lymphedema         Plan:      Track food and weight. Return to clinic as per group medical appointment schedule. Follow-up:  Return in about 1 month (around 2/12/2021). Orders:  No orders of the defined types were placed in this encounter. Prescriptions:  No orders of the defined types were placed in this encounter.       Electronically signed by:  Mark Anthony Mckinney CNP

## 2021-03-09 ENCOUNTER — OFFICE VISIT (OUTPATIENT)
Dept: BARIATRICS/WEIGHT MGMT | Age: 46
End: 2021-03-09
Payer: COMMERCIAL

## 2021-03-09 VITALS
HEIGHT: 60 IN | DIASTOLIC BLOOD PRESSURE: 80 MMHG | SYSTOLIC BLOOD PRESSURE: 130 MMHG | BODY MASS INDEX: 57.52 KG/M2 | WEIGHT: 293 LBS

## 2021-03-09 DIAGNOSIS — K21.9 GASTROESOPHAGEAL REFLUX DISEASE, UNSPECIFIED WHETHER ESOPHAGITIS PRESENT: ICD-10-CM

## 2021-03-09 DIAGNOSIS — E66.01 MORBID OBESITY (HCC): ICD-10-CM

## 2021-03-09 DIAGNOSIS — J45.20 MILD INTERMITTENT ASTHMA WITHOUT COMPLICATION: ICD-10-CM

## 2021-03-09 DIAGNOSIS — I10 ESSENTIAL HYPERTENSION, BENIGN: Primary | ICD-10-CM

## 2021-03-09 DIAGNOSIS — E88.81 INSULIN RESISTANCE: ICD-10-CM

## 2021-03-09 DIAGNOSIS — I89.0 LYMPHEDEMA: ICD-10-CM

## 2021-03-09 DIAGNOSIS — R73.03 PREDIABETES: ICD-10-CM

## 2021-03-09 DIAGNOSIS — N39.3 STRESS INCONTINENCE: ICD-10-CM

## 2021-03-09 PROCEDURE — 99213 OFFICE O/P EST LOW 20 MIN: CPT | Performed by: NURSE PRACTITIONER

## 2021-03-09 NOTE — PROGRESS NOTES
Group Lifestyle Balance Follow-up Progress Note      Subjective     Patient is here for group medical appointment for Group Lifestyle Balance weight management program follow-up for the chronic conditions of HTN, Prediabetes, Asthma, Urinary Incontinence, GERD, Cervical Stenosis, Lymphedema. Patient continues on the program and tolerating well. Total weight gain of 20 lbs. No current issues. 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     Past Surgical History:  Past Surgical History:   Procedure Laterality Date    FOOT SURGERY      TONSILLECTOMY         Family History:  Family History   Problem Relation Age of Onset    High Blood Pressure Mother     Lung Cancer Mother     Cancer Mother     High Blood Pressure Father     Diabetes Father     COPD Father     High Blood Pressure Brother     Thyroid Cancer Brother     Cancer Brother     Heart Disease Maternal Grandmother     Depression Other     High Blood Pressure Other        Social History:  Social History     Socioeconomic History    Marital status: Single     Spouse name: Not on file    Number of children: Not on file    Years of education: Not on file    Highest education level: Not on file   Occupational History    Not on file   Social Needs    Financial resource strain: Not on file    Food insecurity Worry: Not on file     Inability: Not on file    Transportation needs     Medical: Not on file     Non-medical: Not on file   Tobacco Use    Smoking status: Never Smoker    Smokeless tobacco: Never Used   Substance and Sexual Activity    Alcohol use: No    Drug use: No    Sexual activity: Yes     Partners: Male   Lifestyle    Physical activity     Days per week: Not on file     Minutes per session: Not on file    Stress: Not on file   Relationships    Social connections     Talks on phone: Not on file     Gets together: Not on file     Attends Judaism service: Not on file     Active member of club or organization: Not on file     Attends meetings of clubs or organizations: Not on file     Relationship status: Not on file    Intimate partner violence     Fear of current or ex partner: Not on file     Emotionally abused: Not on file     Physically abused: Not on file     Forced sexual activity: Not on file   Other Topics Concern    Not on file   Social History Narrative    Not on file       Current Medications:  Current Outpatient Medications   Medication Sig Dispense Refill    montelukast (SINGULAIR) 10 MG tablet Take 1 tablet by mouth daily 90 tablet 1    pioglitazone (ACTOS) 15 MG tablet TAKE 1 TABLET BY MOUTH ONE TIME A DAY 90 tablet 1    furosemide (LASIX) 20 MG tablet Take 1 tablet by mouth 2 times daily as needed (edema) 180 tablet 1    famotidine (PEPCID) 40 MG tablet Take 1 tablet by mouth 2 times daily 180 tablet 1    lisinopril (PRINIVIL;ZESTRIL) 20 MG tablet TAKE 1 TABLET BY MOUTH ONE TIME A DAY 90 tablet 1    cetirizine (ZYRTEC) 10 MG tablet TAKE 1 TABLET BY MOUTH ONE TIME A DAY 90 tablet 1    ibuprofen (ADVIL;MOTRIN) 800 MG tablet Take 1 tablet by mouth every 8 hours as needed for Pain 90 tablet 1    albuterol sulfate  (90 Base) MCG/ACT inhaler Inhale 2 puffs into the lungs 4 times daily as needed for Wheezing 1 Inhaler 0    nystatin (MYCOSTATIN) 937920 UNIT/GM powder APPLY EXTERNALLY THREE TIMES A DAY  60 g 0     Current Facility-Administered Medications   Medication Dose Route Frequency Provider Last Rate Last Admin    methylPREDNISolone acetate (DEPO-MEDROL) injection 80 mg  80 mg Intramuscular Once Neena Handsome, DO           Vital Signs:  /80 (Site: Right Upper Arm, Position: Sitting, Cuff Size: Large Adult)   Ht 5' (1.524 m)   Wt (!) 360 lb (163.3 kg)   BMI 70.31 kg/m²     BMI/Height/Weight:  Body mass index is 70.31 kg/m². Review of Systems  Constitutional: Weight gain      Objective:      Physical Exam   Vital signs reviewed. General Appearance: Well-developed and well-nourished. No acute distress. Skin: Warm, dry. Head: Normocephalic. Pulmonary/Chest: Normal respiratory effort. Musculoskeletal: Movement x4. Neurological:  Alert and oriented. Individual goal for this encounter:  Get off meds or take less meds, help daughter lose weight also, lessen back/knee pain, be more active with grandkids, learn to eat better. X? Vital signs reviewed and discussed with patient. ? Labs/EKG reviewed and discussed with patient. ? Blood sugar log reviewed and discussed with patient. ? Physical activity discussed. ? Medication changes recommended. ? Smoking cessation discussed. X? Specific questions/concerns addressed. Specific group medical question(s) addressed in this encounter:  Discussion about Vitamin D.      Group discussion topic for this encounter:     ? Welcome Rocky Moreno   ? Be a Fat & Calorie    ? Healthy Eating   ? Move Those Muscles   ? Tip the Calorie Balance   ? Take charge of What's Around You   ? Problem Solving   ? Step Up Your Physical Activity Plan   ? Manage Slips and Self-Defeating Thoughts   ? [address]   ? Make Social Cues Work for Yazminsaranyadanielle Trinidad   ? Ways to Stay Motivated   ? Preparing for Long Term Self-Management  X ? Take Charge of Your Lifestyle   ? Mindful Eating, Mindful Movement   ? Manage Your Stress   ? Sit Less for Health   ? More Volume, Fewer Calories   ? Stay Active   ? Balance Your Thoughts   ? Heart Health    ? Looking Back and Looking Forward    Total time:  90 minutes, greater than 50% of visit spent in group counseling/education. Assessment:       Diagnosis Orders   1. Essential hypertension, benign     2. Gastroesophageal reflux disease, unspecified whether esophagitis present     3. Morbid obesity ([address]     4. Insulin resistance     5. Mild intermittent asthma without complication     6. Prediabetes     7. Lymphedema     8. Stress incontinence         Plan:      Track food and weight. Return to clinic as per group medical appointment schedule. Follow-up:  Return in about 1 month (around 4/9/2021). Orders:  No orders of the defined types were placed in this encounter. Prescriptions:  No orders of the defined types were placed in this encounter.       Electronically signed by:  Greyson Grey CNP

## 2021-03-10 ENCOUNTER — HOSPITAL ENCOUNTER (OUTPATIENT)
Age: 46
Setting detail: SPECIMEN
Discharge: HOME | End: 2021-03-10
Payer: COMMERCIAL

## 2021-03-10 DIAGNOSIS — I89.0 LYMPHEDEMA: ICD-10-CM

## 2021-03-10 DIAGNOSIS — I10 ESSENTIAL HYPERTENSION, BENIGN: ICD-10-CM

## 2021-03-10 DIAGNOSIS — E88.81 INSULIN RESISTANCE: ICD-10-CM

## 2021-03-10 DIAGNOSIS — Z13.220 LIPID SCREENING: ICD-10-CM

## 2021-03-17 ENCOUNTER — HOSPITAL ENCOUNTER (OUTPATIENT)
Age: 46
Setting detail: SPECIMEN
Discharge: HOME | End: 2021-03-17
Payer: COMMERCIAL

## 2021-03-17 LAB
ALBUMIN SERPL-MCNC: 4.4 G/DL (ref 3.5–5.2)
ALBUMIN/GLOBULIN RATIO: 1.3 (ref 1–2.5)
ALP BLD-CCNC: 58 U/L (ref 35–104)
ALT SERPL-CCNC: 11 U/L (ref 5–33)
ANION GAP SERPL CALCULATED.3IONS-SCNC: 16 MMOL/L (ref 9–17)
AST SERPL-CCNC: 13 U/L
BILIRUB SERPL-MCNC: 0.46 MG/DL (ref 0.3–1.2)
BUN BLDV-MCNC: 17 MG/DL (ref 6–20)
BUN/CREAT BLD: NORMAL (ref 9–20)
CALCIUM SERPL-MCNC: 9.3 MG/DL (ref 8.6–10.4)
CHLORIDE BLD-SCNC: 100 MMOL/L (ref 98–107)
CHOLESTEROL/HDL RATIO: 3.1
CHOLESTEROL: 147 MG/DL
CO2: 23 MMOL/L (ref 20–31)
CREAT SERPL-MCNC: 0.7 MG/DL (ref 0.5–0.9)
ESTIMATED AVERAGE GLUCOSE: 105 MG/DL
GFR AFRICAN AMERICAN: >60 ML/MIN
GFR NON-AFRICAN AMERICAN: >60 ML/MIN
GFR SERPL CREATININE-BSD FRML MDRD: NORMAL ML/MIN/{1.73_M2}
GFR SERPL CREATININE-BSD FRML MDRD: NORMAL ML/MIN/{1.73_M2}
GLUCOSE BLD-MCNC: 93 MG/DL (ref 70–99)
HBA1C MFR BLD: 5.3 % (ref 4–6)
HDLC SERPL-MCNC: 48 MG/DL
INSULIN COMMENT: NORMAL
INSULIN REFERENCE RANGE:: NORMAL
INSULIN: 36.7 MU/L
LDL CHOLESTEROL: 61 MG/DL (ref 0–130)
POTASSIUM SERPL-SCNC: 4.6 MMOL/L (ref 3.7–5.3)
SODIUM BLD-SCNC: 139 MMOL/L (ref 135–144)
TOTAL PROTEIN: 7.7 G/DL (ref 6.4–8.3)
TRIGL SERPL-MCNC: 188 MG/DL
VLDLC SERPL CALC-MCNC: ABNORMAL MG/DL (ref 1–30)

## 2021-08-11 ENCOUNTER — HOSPITAL ENCOUNTER (OUTPATIENT)
Age: 46
Setting detail: SPECIMEN
Discharge: HOME | End: 2021-08-11
Payer: COMMERCIAL

## 2021-08-11 DIAGNOSIS — R25.2 MUSCLE CRAMPING: ICD-10-CM

## 2021-08-11 LAB
ALBUMIN SERPL-MCNC: 4.1 G/DL (ref 3.5–5.2)
ALBUMIN/GLOBULIN RATIO: 1.4 (ref 1–2.5)
ALP BLD-CCNC: 62 U/L (ref 35–104)
ALT SERPL-CCNC: 14 U/L (ref 5–33)
ANION GAP SERPL CALCULATED.3IONS-SCNC: 18 MMOL/L (ref 9–17)
AST SERPL-CCNC: 13 U/L
BILIRUB SERPL-MCNC: 0.32 MG/DL (ref 0.3–1.2)
BUN BLDV-MCNC: 15 MG/DL (ref 6–20)
BUN/CREAT BLD: ABNORMAL (ref 9–20)
CALCIUM SERPL-MCNC: 9 MG/DL (ref 8.6–10.4)
CHLORIDE BLD-SCNC: 103 MMOL/L (ref 98–107)
CO2: 21 MMOL/L (ref 20–31)
CREAT SERPL-MCNC: 0.72 MG/DL (ref 0.5–0.9)
GFR AFRICAN AMERICAN: >60 ML/MIN
GFR NON-AFRICAN AMERICAN: >60 ML/MIN
GFR SERPL CREATININE-BSD FRML MDRD: ABNORMAL ML/MIN/{1.73_M2}
GFR SERPL CREATININE-BSD FRML MDRD: ABNORMAL ML/MIN/{1.73_M2}
GLUCOSE FASTING: 83 MG/DL (ref 70–99)
MAGNESIUM: 2 MG/DL (ref 1.6–2.6)
POTASSIUM SERPL-SCNC: 3.9 MMOL/L (ref 3.7–5.3)
SODIUM BLD-SCNC: 142 MMOL/L (ref 135–144)
TOTAL PROTEIN: 7 G/DL (ref 6.4–8.3)

## 2021-09-23 ENCOUNTER — HOSPITAL ENCOUNTER (OUTPATIENT)
Dept: GENERAL RADIOLOGY | Facility: CLINIC | Age: 46
Discharge: HOME | End: 2021-09-25
Payer: COMMERCIAL

## 2021-09-23 DIAGNOSIS — M54.50 LOW BACK PAIN, UNSPECIFIED BACK PAIN LATERALITY, UNSPECIFIED CHRONICITY, UNSPECIFIED WHETHER SCIATICA PRESENT: ICD-10-CM

## 2021-09-23 PROCEDURE — 72110 X-RAY EXAM L-2 SPINE 4/>VWS: CPT

## 2021-09-24 ENCOUNTER — TELEPHONE (OUTPATIENT)
Dept: BARIATRICS/WEIGHT MGMT | Age: 46
End: 2021-09-24

## 2021-09-24 NOTE — TELEPHONE ENCOUNTER
Online Info Session Completed:  on 9/21/21 okay to schedule with Dr. Brady Zhou   with [facility]    Patient informed the following: This is NOT a guarantee of payment  When stating that you have a Benefit or Coverage for Bariatric Surgery - that means that you may qualify for the surgery  Bariatric Surgery is considered an elective procedure, patient is responsible to know their benefits . Any information we obtain when calling your insurance  is not  a guarantee of  coverage  and/or  benefit. Appointment Note :   New Patient , BC/BS,   6   month visits,  PG Fee $200,  Mailed Packet or advised to arrive  early      Remind Patient of $200 Program fee with $ 100 required at Second visit with office on initial dietician visit. Remind Patient they must be nicotine free. They will be tested at the beginning of the program and prior to surgery. Advise Patient Responsible for out of pocket, copay at medical visits, Deductible and coinsurance applied to medical visits and procedure. You will be responsible for any of the following:  · Copays   · Deductibles : Indiv $400/Family $800  · OOP: Indiv $ 2500/ Family $7500  · Co insurances: 100% after deductible/OOp is met    The items mentioned above are indicated or required by your insurance plan. Your deductible and coinsurance are applied to medical visits and procedures. Verified with patient if he or she has had any previous bariatric surgery? yes  ( If yes ,advise patient of transfer of care process and program fee)     Patient scheduled 10/27/21. Bethanie Marcano

## 2021-10-08 ENCOUNTER — HOSPITAL ENCOUNTER (OUTPATIENT)
Age: 46
Setting detail: SPECIMEN
Discharge: HOME | End: 2021-10-08
Payer: COMMERCIAL

## 2021-10-08 DIAGNOSIS — E88.81 INSULIN RESISTANCE: ICD-10-CM

## 2021-10-08 DIAGNOSIS — E66.01 MORBID OBESITY (HCC): ICD-10-CM

## 2021-10-08 LAB
CORTISOL COLLECTION INFO: ABNORMAL
CORTISOL: 0.5 UG/DL (ref 2.7–18.4)
TSH SERPL DL<=0.05 MIU/L-ACNC: 1.27 MIU/L (ref 0.3–5)

## 2021-10-27 ENCOUNTER — OFFICE VISIT (OUTPATIENT)
Dept: BARIATRICS/WEIGHT MGMT | Age: 46
End: 2021-10-27
Payer: COMMERCIAL

## 2021-10-27 VITALS
WEIGHT: 293 LBS | RESPIRATION RATE: 20 BRPM | BODY MASS INDEX: 53.92 KG/M2 | SYSTOLIC BLOOD PRESSURE: 128 MMHG | HEART RATE: 92 BPM | DIASTOLIC BLOOD PRESSURE: 80 MMHG | HEIGHT: 62 IN

## 2021-10-27 DIAGNOSIS — I10 ESSENTIAL HYPERTENSION: Primary | ICD-10-CM

## 2021-10-27 DIAGNOSIS — E66.01 MORBID OBESITY WITH BMI OF 60.0-69.9, ADULT (HCC): ICD-10-CM

## 2021-10-27 DIAGNOSIS — R06.83 SNORING: ICD-10-CM

## 2021-10-27 DIAGNOSIS — K21.9 GASTROESOPHAGEAL REFLUX DISEASE WITHOUT ESOPHAGITIS: ICD-10-CM

## 2021-10-27 PROCEDURE — 99204 OFFICE O/P NEW MOD 45 MIN: CPT | Performed by: SURGERY

## 2021-10-27 NOTE — PROGRESS NOTES
[facility]  [facility] INVASIVE BARIATRIC SURG  [address] [address]  Dept: [phone]    SURGICAL WEIGHT MANAGEMENT PROGRAM  PROGRESS NOTE INITIAL EVALUATION     Patient: Radha Li        Service Date: 10/27/2021      HPI:     Chief Complaint   Patient presents with    Bariatric, Initial Visit     BcBs 6 mon visits     Weight Loss       The patient is a pleasant 48y.o. year old female  with morbid obesity, who stands Height: 5' 2\" (157.5 cm) tall with a weight of Weight: (!) 370 lb (167.8 kg) , resulting in a BMI of Body mass index is 67.67 kg/m². . The patient suffers from multiple co-morbidities as a result of morbid obesity, including: Hypertension, Asthma, Dyspnea on Exertion, GERD and Degenerative Joint Disease (DJD). She has suffered from obesity for many years. The patient denies  a history of myocardial infarction, deep vein thrombosis, pulmonary embolism, renal failure, hepatic failure and stroke. The patient has failed multiple attempts at non-surgical weight loss, and is now seeking surgical intervention to promote permanent and consistent weight loss. She  has chosen Sleeve Gastrectomy. She is well educated regarding it, as she has recently viewed our weight loss surgery informational seminar .      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    Iron deficiency anemia, unspecified     Neural foraminal stenosis of cervical spine 1/18/2018    Ringing in ears     Snoring     Urinary incontinence        Surgical History:  Past Surgical History: Procedure Laterality Date    FOOT SURGERY      TONSILLECTOMY         Family History:      Problem Relation Age of Onset    High Blood Pressure Mother     Lung Cancer Mother     Cancer Mother     High Blood Pressure Father     Diabetes Father     COPD Father     High Blood Pressure Brother     Thyroid Cancer Brother     Cancer Brother     Heart Disease Maternal Grandmother     Depression Other     High Blood Pressure Other        Social History:   Social History     Tobacco Use    Smoking status: Never Smoker    Smokeless tobacco: Never Used   Substance Use Topics    Alcohol use: No    Drug use: No       Current Med List:  Current Outpatient Medications   Medication Sig Dispense Refill    furosemide (LASIX) 40 MG tablet Take 1 tablet by mouth 2 times daily as needed (edema) 60 tablet 0    dexamethasone (DECADRON) 1 MG tablet Take as directed x 1 the night before cortisol level checked for dexamethasone suppression test. 1 tablet 0    montelukast (SINGULAIR) 10 MG tablet Take 1 tablet by mouth daily 90 tablet 1    pioglitazone (ACTOS) 15 MG tablet TAKE 1 TABLET BY MOUTH ONE TIME A DAY 90 tablet 1    furosemide (LASIX) 20 MG tablet Take 1 tablet by mouth 2 times daily as needed (edema) 180 tablet 1    famotidine (PEPCID) 40 MG tablet Take 1 tablet by mouth 2 times daily 180 tablet 1    lisinopril (PRINIVIL;ZESTRIL) 20 MG tablet TAKE 1 TABLET BY MOUTH ONE TIME A DAY 90 tablet 1    cetirizine (ZYRTEC) 10 MG tablet TAKE 1 TABLET BY MOUTH ONE TIME A DAY 90 tablet 1    ibuprofen (ADVIL;MOTRIN) 800 MG tablet Take 1 tablet by mouth every 8 hours as needed for Pain 90 tablet 1    albuterol sulfate  (90 Base) MCG/ACT inhaler Inhale 2 puffs into the lungs 4 times daily as needed for Wheezing 1 Inhaler 0    nystatin (MYCOSTATIN) 786910 UNIT/GM powder APPLY EXTERNALLY THREE TIMES A DAY  60 g 0     Current Facility-Administered Medications   Medication Dose Route Frequency Provider Last Rate Last Admin    methylPREDNISolone acetate (DEPO-MEDROL) injection 80 mg  80 mg IntraMUSCular Once Geoemma Kelly,             Allergies   Allergen Reactions    Dicloxacillin     Metformin And Related Diarrhea       SOCIAL:      This patient is alone for the evaluation today. [] HIV Risk Factors (i.e.) intravenous drug abuser; at risk sexual behavior; received blood products    [] TB Risk Factors (i.e.) Medically underserved, institutional care, foreign born, endemic area; exposure to active case    [] Hepatitis B&C Risk Factors (i.e.) Received blood transfusion prior to 1992; recreational drug use; high risk sexual behaviors; tattoos or body piercings; contact with blood or needle sticks in the workplace    Comprehension    Ability to grasp concepts and respond to questions:   [x] High   [] Medium   [] Low    Motivation    [x] Asks Questions; eager to learn   [] Needs education   [] Extreme anxiety    [] uncooperative   [] Denies need for education    English Speaking Ability    [x] Speaks English well   [x] Reads English well   [x] Understands spoken english    [x] Understands written English   [] No need for interpretive support      [] Might benefit from interpretive support   []  required for all services     REVIEW OF SYSTEMS: (Negative unless marked otherwise)     See review of Systems scanned into media    PRESENT ILLNESS:     Weight Parameters  Weight (!) 370 lb (167.8 kg)   Height 5' 2\" (1.575 m)   BMI Body mass index is 67.67 kg/m².    IBW     EBW               IMMUNIZATION STATUS  Immunization History   Administered Date(s) Administered    COVID-19, Pfizer, PF, 30mcg/0.3mL 06/08/2021, 06/29/2021    Influenza Virus Vaccine 09/15/2014, 10/05/2015    Influenza, Quadv, IM, PF (6 mo and older Fluzone, Flulaval, Fluarix, and 3 yrs and older Afluria) 10/22/2018, 10/24/2019    Tdap (Boostrix, Adacel) 06/01/2013     SMOKING CESSATION     [x] Not needed     [] Instructed to stop smoking [] Pamphlet community resources given     VTE SCREEN    [] Family hx DVT/PE  /   [] Personal hx of DVT/PE    [x] Denies any family or personal hx of DVT/PE    Physician Review    [x] Past medical, family, & social history reviewed and discussed with patient. Review of surgery and post-surgical changes (by surgeon for surgical patients only)    [x] Lifelong diet expectations reviewed with patient    [x] Need for lifelong vitamin supplementation reviewed with patient    PHYSICAL EXAMINATION:      /80 (Site: Right Upper Arm, Position: Sitting, Cuff Size: Large Adult)   Pulse 92   Resp 20   Ht 5' 2\" (1.575 m)   Wt (!) 370 lb (167.8 kg)   BMI 67.67 kg/m²     Constitutional:  Vital signs are normal. The patient appears well-developed   HEENT:      Head: Normocephalic. Atraumatic     Eyes: pupils are equal and reactive. No scleral icterus is present. Neck: No mass and no thyromegaly present. Cardiovascular: Normal rate, regular rhythm, S1 normal and S2 normal.  Bilateral pulses present. Pulmonary/Chest: Effort normal and breath sounds normal. No retractions. Abdominal: Soft. Normal appearance. There is no organomegaly. No tenderness. There is no rigidity, no rebound, no guarding and no Vela's sign. Musculoskeletal:      Right lower leg: Normal. No tenderness and no edema. Left lower leg: Normal. No tenderness and no edema. Lymphadenopathy:     No cervical adenopathy, No Exrtemity Adenopathy. Neurological: The patient is alert and oriented. Moving all four extremities equally, sensation grossly intact bilateral.  Skin: Skin is warm, dry and intact. Psychiatric: The patient has a normal mood and affect.  Speech is normal and behavior is normal. Judgment and thought content normal. Cognition and memory are normal.     RECOMMENDATIONS:     We spent a great deal of time discussing the risks and benefits of Sleeve Gastrectomy, including but not limited to injury to intra-abdominal organs, breakdown of the gastric staple line, the need for re-operative therapy,  prolonged hospitalization,  mechanical ventilation,  and death. We discussed the possibility of bleeding, the need for blood transfusions, blood clots, hospital-acquired and intra-abdominal infection, anastomotic stricture, and worsening GERD. And we discussed the need for post-operative visit compliance, behavior modifications and diet changes, protein and vitamin supplementation, as well as routine scheduled and dedicated exercise. I instructed the patient to utilize the exercise log that will be given to them at their fist dietician appointment. We discussed the potential weight loss benefit of approximately 50-60% of her excess body weight at 12-18 months post-op, as well as the possibility of insufficient weight loss or weight gain after 2 years post-operative time. Discussed the risk of substance abuse and or nicotine abuse today with patient. They expressed understanding of the risks of abuse of such drugs. PLAN:       Diagnosis Orders   1. Essential hypertension     2. Gastroesophageal reflux disease without esophagitis     3. Snoring  Baseline Diagnostic Sleep Study   4. Morbid obesity with BMI of 60.0-69.9, adult (HCC)            Initial Testing     Primary Procedure: Sleeve Gastrectomy     Labwork: Initial Pre-surgical Lab Tests (CMP, TSH, Fasting Lipid Profile, Mg, Zinc, Vit B1 (whole blood), Vit B12, 25-OH Vit D, Fe,  Ferritin,  Folate) and Negative serum nicotine prior to submission for pre-auth to rule out nutritional deficiencies with BMI 67 and consideration of bariatric surgery    Endoscopic Studies: Upper GI Endoscopy for GERD which has been untreated.     Psychological Assessment: Psychological Evaluation and Clearance to rule out eating disorder with BMI 67    Nutrition Assessment: Bariatric Nutrition Assessment and Clearance    Pulmonary Evaluation: Obstructive Sleep Apnea Evaluation and for snoring and BMI 79    Other  Consultations: Medical clearance with hypertension and insulin resistance with BMI 67    Physician Supervised Diet and Exercise required by the patients insurance company: 6 months.       Surgical Diet requirement:  2 weeks      Final Testing  Screening Chest Xray  and EKG within 6 months of date of surgery    Labwork:  Final Lab Tests  within 3 months of date of surgery (CBC, PT/PTT, BMP)     Electronically signed by Yohana Handy DO on 10/31/2021 at 11:47 AM

## 2021-10-27 NOTE — LETTER
Visit Date: 10/27/2021    Patient: Bernardo Gustafson  YOB: 1975    Dear Dr. Anca Schroeder DO,      I had the pleasure of seeing Carmina Radford in the office today for a consult for weight loss surgery. Her current Weight: (!) 370 lb (167.8 kg), which gives her a Body mass index is 67.67 kg/m². .  We had a long discussion regarding surgical options for weight loss and improvement in co-morbidities. She is considering a bariatric  procedure. We will start the preoperative workup, which includes bloodwork, psychological evaluation, support group attendance, and preoperative medical clearance from you. We will also initiate pre-certification for surgery, which requires a letter of medical necessity from you and often office notes documenting a weight history and co-morbidities. Moni will require 6 months of medically supervised visits as specified by the patient's insurance. Thank you for allowing me to participate in the care of your patient. If you have any questions or concerns, please do not hesitate to call.       Sincerely,     Ramo Vernon DO  Director of Bariatric and Minimally Invasive Surgery  [facility], [address] [facility], [address] [place] [address]  Landon Speaks: [phone]  F: [phone]

## 2021-11-17 ENCOUNTER — NURSE ONLY (OUTPATIENT)
Dept: BARIATRICS/WEIGHT MGMT | Age: 46
End: 2021-11-17

## 2021-11-17 VITALS — BODY MASS INDEX: 68.22 KG/M2 | WEIGHT: 293 LBS

## 2021-11-17 NOTE — PROGRESS NOTES
Medical Nutrition Therapy  Initial Nutrition Assessment for Metabolic/ Bariatric Surgery  Required insurance visit prior to surgery:  6  Shared with patient the importance of documenting exercise and staying at or below start weight during visits. Pt reports: GLB. Lymphedema for 2 years and worsening with time    Asif Monahan is a 48 y.o. female with a date of birth of 1975. Weight History: Wt Readings from Last 3 Encounters:   10/27/21 (!) 370 lb (167.8 kg)   09/23/21 (!) 374 lb (169.6 kg)   07/14/21 (!) 366 lb (166 kg)        How does your weight affect your daily activities?joint pain, back pain, fatigue and short of breath with activity      What would be different in your life if you felt healthier and fit? Why is that important to you now? Unable to keep up with family  Do you drink alcohol? . NO    Do you use tobacco in the form of cigarettes, cigars, chew or any vapor appliance? No    Weight History      Moni Abrams's highest adult weight was 373 lbs at age . Patient was at her highest weight for  . Patient's triggers/known causes to her highest weight are . Moni Abrams's lowest adult weight was 168 lbs at age . Patient was at her lowest weight for  . The lowest weight was achieved through  . Physical Activity  Do you participate in a structured exercise program, step counting or regular physical activity? yes      Instructions and exercise logs were provided to patient today see goal sheet and plan. Previous weight loss attempts  Patient has participated in the following weight loss programs:   GLB      Nutrition History  Have you ever been diagnosed with an eating disorder? No  Have you ever had problems tolerating a multivitamin or mineral supplement?no  Have you ever been diagnosed with a vitamin or mineral deficiency? no     Patient dines out to a sit down restaurant 1 times per day.     Patient dines out to a fast food restaurant 1 times per month. Patient does have grazing. Patient does have night eating. Patient does not have a history of emotional eating. Patient does have a history of  eating out of boredom. Drinks throughout the day: water, pop and te    24 hour recall/food frequency: has been scanned into chart unless completed below. Surgery  Patient's greatest concern about having surgery is: that it wont work. How will you know when you've been successful? Assessment:  Nutritional Needs:  Men: 1500kcal daily minimum     Women 1200kcal daily minimum. 60-80gm of protein daily  PES Statement:  Obesity related to a complex combination of decreased energy needs, disordered eating patterns, physical inactivity, and increased psychological/life stress as evidenced by BMI greater than 30 and inability to maintain a significant amount of weight loss through conventional weight loss interventions. Goals    All goals were planned with and agreed on by the patient. I want to improve my health because I want to be more active  appt # NA G What is your next step? C 1 2 3 4 5 6 7 8 9     0  1 I will read the education binder provided to me and the                   2 I will make my pschological evaluation appoinment. x              0  3 I will bring this goal card to every appointment. x 4 I will eliminate all tobacco/nicotine. x 5 I will limit alcoholic beverages to 8-2KG per week. 6 I will limit dining out to 3 times per week or less. x 7 I will eliminate sugary beverages. x 8 I will eliminate carbonated beverages. 9 I will eliminate drinking with a straw. 10 I will limit caffeinated beverages to 16oz daily. 0  11 I will limit log my exercise daily. 12 I will determine my calcium and mvi plan.                  13 I will have 1-2 servings of lean protein present at each meal and minimeal.                 14 I will eat every 3-5 hours. 15 I will drink 64oz of fluid daily. 16 I will eat slowly during meals and snacks. 17 I will limit fluids 4oz before after and during meals. 18 I will eat protein first at all meals followed by vegetables,  Fruit and lastly whole grains. 23 My first weight neutral approach is:                 20 My second weight neutral approach is:                 21  My Thirds weight neutral approach is:                 22                  23                  24                  25                  Goals reviewed with patient as below  Do you understand your goals? Do you have the information you need to achieve your goals? Do you have any questions  right now? Plan    Exercise for Health 15 easy exercises to do at home with 6 activity logs were provided to the patient with verbal and written instructions on how to carry this out. Goal number 14 was provided to the patient on this visit please see above. Will follow up each month and provide support as patient begins to add physical activity to life style. Monitor and review goals adjust as needed. Follow up monthly supervised diet and exercise.        Charisse Del Real RD, LD

## 2021-12-06 ENCOUNTER — NURSE ONLY (OUTPATIENT)
Dept: BARIATRICS/WEIGHT MGMT | Age: 46
End: 2021-12-06

## 2021-12-14 ENCOUNTER — OFFICE VISIT (OUTPATIENT)
Dept: BARIATRICS/WEIGHT MGMT | Age: 46
End: 2021-12-14
Payer: COMMERCIAL

## 2021-12-14 VITALS
DIASTOLIC BLOOD PRESSURE: 78 MMHG | SYSTOLIC BLOOD PRESSURE: 120 MMHG | BODY MASS INDEX: 53.92 KG/M2 | HEART RATE: 88 BPM | HEIGHT: 62 IN | WEIGHT: 293 LBS

## 2021-12-14 DIAGNOSIS — I10 ESSENTIAL HYPERTENSION, BENIGN: Primary | ICD-10-CM

## 2021-12-14 DIAGNOSIS — K21.9 GASTROESOPHAGEAL REFLUX DISEASE, UNSPECIFIED WHETHER ESOPHAGITIS PRESENT: ICD-10-CM

## 2021-12-14 DIAGNOSIS — M19.90 ARTHRITIS: ICD-10-CM

## 2021-12-14 DIAGNOSIS — R73.03 PREDIABETES: ICD-10-CM

## 2021-12-14 DIAGNOSIS — J45.20 MILD INTERMITTENT ASTHMA WITHOUT COMPLICATION: ICD-10-CM

## 2021-12-14 DIAGNOSIS — E66.01 MORBID OBESITY (HCC): ICD-10-CM

## 2021-12-14 DIAGNOSIS — I89.0 LYMPHEDEMA: ICD-10-CM

## 2021-12-14 PROCEDURE — 99213 OFFICE O/P EST LOW 20 MIN: CPT | Performed by: NURSE PRACTITIONER

## 2021-12-14 NOTE — PROGRESS NOTES
Medical Weight Management Progress Note    Subjective      Patient being seen for medically supervised weight loss for the chronic conditions of HTN, GERD, Asthma, Prediabetes, Arthritis, Lymphedema. She is working on the behavior changes discussed at the initial appointment. Patient continues on diet plan. Physical activity includes yoga ball. Weight loss since last visit is 0 lbs. Sleep study scheduled 1/7/22. Psych eval scheduled 12/13/21. Needs to schedule EGD. No current issues. Working toward bariatric surgery:    [x] Sleeve Gastrectomy                                                           [] Abhay-en-Y Gastric Bypass    Allergies: Allergies   Allergen Reactions    Dicloxacillin     Metformin And Related Diarrhea       Past Medical History:     Past Medical History:   Diagnosis Date    Allergic rhinitis     Arthritis     Asthma     Caffeine use     1 coffee, 1-3 tea daily    Class 3 obesity with serious comorbidity and body mass index (BMI) of 60.0 to 69.9 in adult 1/18/2018    GERD (gastroesophageal reflux disease)     Heartburn     Hypertension     Insulin resistance 9/9/2014    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     Past Surgical History:  Past Surgical History:   Procedure Laterality Date    FOOT SURGERY      TONSILLECTOMY         Family History:  Family History   Problem Relation Age of Onset    High Blood Pressure Mother     Lung Cancer Mother     Cancer Mother     High Blood Pressure Father     Diabetes Father     COPD Father     High Blood Pressure Brother     Thyroid Cancer Brother     Cancer Brother     Heart Disease Maternal Grandmother     Depression Other     High Blood Pressure Other        Social History:  Social History     Socioeconomic History  Marital status: Single     Spouse name: Not on file    Number of children: Not on file    Years of education: Not on file    Highest education level: Not on file   Occupational History    Not on file   Tobacco Use    Smoking status: Never Smoker    Smokeless tobacco: Never Used   Substance and Sexual Activity    Alcohol use: No    Drug use: No    Sexual activity: Yes     Partners: Male   Other Topics Concern    Not on file   Social History Narrative    Not on file     Social Determinants of Health     Financial Resource Strain: Low Risk     Difficulty of Paying Living Expenses: Not hard at all   Food Insecurity: No Food Insecurity    Worried About Running Out of Food in the Last Year: Never true    [address] in the Last Year: Never true   Transportation Needs:     Lack of Transportation (Medical): Not on file    Lack of Transportation (Non-Medical):  Not on file   Physical Activity:     Days of Exercise per Week: Not on file    Minutes of Exercise per Session: Not on file   Stress:     Feeling of Stress : Not on file   Social Connections:     Frequency of Communication with Friends and Family: Not on file    Frequency of Social Gatherings with Friends and Family: Not on file    Attends Holiness Services: Not on file    Active Member of [address] [place] [address] [employer] or Organizations: Not on file    Attends Club or Organization Meetings: Not on file    Marital Status: Not on file   Intimate Partner Violence:     Fear of Current or Ex-Partner: Not on file    Emotionally Abused: Not on file    Physically Abused: Not on file    Sexually Abused: Not on file   Housing Stability:     Unable to Pay for Housing in the Last Year: Not on file    Number of [place] in the Last Year: Not on file    Unstable Housing in the Last Year: Not on file       Current Medications:  Current Outpatient Medications   Medication Sig Dispense Refill    furosemide (LASIX) 40 MG tablet Take 1 tablet by mouth 2 times daily as needed (edema) 60 tablet 0    dexamethasone (DECADRON) 1 MG tablet Take as directed x 1 the night before cortisol level checked for dexamethasone suppression test. 1 tablet 0    montelukast (SINGULAIR) 10 MG tablet Take 1 tablet by mouth daily 90 tablet 1    pioglitazone (ACTOS) 15 MG tablet TAKE 1 TABLET BY MOUTH ONE TIME A DAY 90 tablet 1    furosemide (LASIX) 20 MG tablet Take 1 tablet by mouth 2 times daily as needed (edema) 180 tablet 1    famotidine (PEPCID) 40 MG tablet Take 1 tablet by mouth 2 times daily 180 tablet 1    lisinopril (PRINIVIL;ZESTRIL) 20 MG tablet TAKE 1 TABLET BY MOUTH ONE TIME A DAY 90 tablet 1    cetirizine (ZYRTEC) 10 MG tablet TAKE 1 TABLET BY MOUTH ONE TIME A DAY 90 tablet 1    ibuprofen (ADVIL;MOTRIN) 800 MG tablet Take 1 tablet by mouth every 8 hours as needed for Pain 90 tablet 1    albuterol sulfate  (90 Base) MCG/ACT inhaler Inhale 2 puffs into the lungs 4 times daily as needed for Wheezing 1 Inhaler 0    nystatin (MYCOSTATIN) 129080 UNIT/GM powder APPLY EXTERNALLY THREE TIMES A DAY  60 g 0     Current Facility-Administered Medications   Medication Dose Route Frequency Provider Last Rate Last Admin    methylPREDNISolone acetate (DEPO-MEDROL) injection 80 mg  80 mg IntraMUSCular Once Asher Kicks, DO           Vital Signs:  /78 (Site: Right Upper Arm, Position: Sitting, Cuff Size: Large Adult)   Pulse 88   Ht 5' 2\" (1.575 m)   Wt (!) 368 lb (166.9 kg)   BMI 67.31 kg/m²     BMI/Height/Weight:  Body mass index is 67.31 kg/m². Review of Systems - A review of systems was performed. All was negative unless otherwise documented in HPI. Constitutional: Negative for fever, chills and diaphoresis. HENT: Negative for hearing loss and trouble swallowing. Eyes: Negative for photophobia and visual disturbance. Respiratory: Negative for cough, shortness of breath and wheezing. Cardiovascular: Negative for chest pain and palpitations. Gastrointestinal: Negative for nausea, vomiting, abdominal pain, diarrhea, constipation, blood in stool and abdominal distention. Endocrine: Negative for polydipsia, polyphagia and polyuria. Genitourinary: Negative for dysuria, frequency, hematuria and difficulty urinating. Musculoskeletal: Negative for myalgias, joint swelling. Skin: Negative for pallor and rash. Neurological: Negative for dizziness, tremors, light-headedness and headaches. Psychiatric/Behavioral: Negative for sleep disturbance and dysphoric mood. Objective:      Physical Exam   Vital signs reviewed. General: Well-developed and well-nourished. No acute distress. Skin: Warm, dry and intact. HEENT: Normocephalic. EOMs intact. Conjunctivae normal. Neck supple. Cardiovascular: Normal rate, regular rhythm. Pulmonary/Chest: Normal effort. Lungs clear to auscultation. No rales, rhonchi or wheezing. Abdominal: Positive bowel sounds. Soft, nontender. Nondistended. Musculoskeletal: Movement x4. Lymphedema bilaterally. Neurological: Gait normal. Alert and oriented to person, place, and time. Psychiatric: Normal mood and affect. Speech and behavior normal. Judgment and thought content normal. Cognition and memory intact. Assessment:       Diagnosis Orders   1. Essential hypertension, benign  CBC Auto Differential    Comprehensive Metabolic Panel    Hemoglobin A1C    Iron and TIBC    Magnesium    Lipid Panel    PTH, Intact    TSH without Reflex    Vitamin B1    Vitamin A    Vitamin B12 & Folate    Zinc    Vitamin D 25 Hydroxy   2. Gastroesophageal reflux disease, unspecified whether esophagitis present  CBC Auto Differential    Comprehensive Metabolic Panel    Hemoglobin A1C    Iron and TIBC    Magnesium    Lipid Panel    PTH, Intact    TSH without Reflex    Vitamin B1    Vitamin A    Vitamin B12 & Folate    Zinc    Vitamin D 25 Hydroxy   3.  Morbid obesity (HCC)  CBC Auto Differential    Comprehensive Metabolic Panel Hemoglobin A1C    Iron and TIBC    Magnesium    Lipid Panel    PTH, Intact    TSH without Reflex    Vitamin B1    Vitamin A    Vitamin B12 & Folate    Zinc    Vitamin D 25 Hydroxy   4. Mild intermittent asthma without complication  CBC Auto Differential    Comprehensive Metabolic Panel    Hemoglobin A1C    Iron and TIBC    Magnesium    Lipid Panel    PTH, Intact    TSH without Reflex    Vitamin B1    Vitamin A    Vitamin B12 & Folate    Zinc    Vitamin D 25 Hydroxy   5. Prediabetes  CBC Auto Differential    Comprehensive Metabolic Panel    Hemoglobin A1C    Iron and TIBC    Magnesium    Lipid Panel    PTH, Intact    TSH without Reflex    Vitamin B1    Vitamin A    Vitamin B12 & Folate    Zinc    Vitamin D 25 Hydroxy   6. Lymphedema  CBC Auto Differential    Comprehensive Metabolic Panel    Hemoglobin A1C    Iron and TIBC    Magnesium    Lipid Panel    PTH, Intact    TSH without Reflex    Vitamin B1    Vitamin A    Vitamin B12 & Folate    Zinc    Vitamin D 25 Hydroxy   7. Arthritis  CBC Auto Differential    Comprehensive Metabolic Panel    Hemoglobin A1C    Iron and TIBC    Magnesium    Lipid Panel    PTH, Intact    TSH without Reflex    Vitamin B1    Vitamin A    Vitamin B12 & Folate    Zinc    Vitamin D 25 Hydroxy       Plan:    Dietitian visit today. Patient was encouraged to journal all food intake. Keep calorie level at approximately 3839-7425. Protein intake is to be a minimum of 60-80 grams per day. Water drinking was encouraged with a goal of 64oz-128oz daily. Beverages to be calorie free except for milk. Every other beverage should be water. Avoid soda. Continue to increase level of physical activity. Encouraged use of exercise log. Follow-up  Return in about 1 month (around 1/14/2022).     Orders this encounter:  Orders Placed This Encounter   Procedures    CBC Auto Differential     Standing Status:   Future     Standing Expiration Date:   12/14/2022    Comprehensive Metabolic Panel     Standing Status:   Future     Standing Expiration Date:   12/14/2022    Hemoglobin A1C     Standing Status:   Future     Standing Expiration Date:   12/14/2022    Iron and TIBC     Standing Status:   Future     Standing Expiration Date:   12/14/2022     Order Specific Question:   Is Patient Fasting? Answer:   yes     Order Specific Question:   No of Hours? Answer:   15    Magnesium     Standing Status:   Future     Standing Expiration Date:   12/14/2022    Lipid Panel     Standing Status:   Future     Standing Expiration Date:   12/14/2022     Order Specific Question:   Is Patient Fasting?/# of Hours     Answer:   12    PTH, Intact     Standing Status:   Future     Standing Expiration Date:   12/14/2022    TSH without Reflex     Standing Status:   Future     Standing Expiration Date:   12/14/2022    Vitamin B1     Standing Status:   Future     Standing Expiration Date:   12/14/2022    Vitamin A     Standing Status:   Future     Standing Expiration Date:   12/14/2022    Vitamin B12 & Folate     Standing Status:   Future     Standing Expiration Date:   12/14/2022    Zinc     Standing Status:   Future     Standing Expiration Date:   12/14/2022    Vitamin D 25 Hydroxy     Standing Status:   Future     Standing Expiration Date:   12/14/2022       Prescriptions this encounter:  No orders of the defined types were placed in this encounter.       Electronically signed by:  Flaquita Chavira CNP

## 2021-12-14 NOTE — PROGRESS NOTES
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 Often she eats out for both lunch and dinner and skips breakfast. Pt used to do premiere protein shakes for breakfast and has them at home still. Pt will add 1 protein shake at breakfast to increase meals/day. Changes in eating patterns to promote health are noted below on the goals number 19-22    Vitals: Wt Readings from Last 3 Encounters:   12/14/21 (!) 368 lb (166.9 kg)   11/17/21 (!) 373 lb (169.2 kg)   10/27/21 (!) 370 lb (167.8 kg)         Nutrition Assessment:   PES: Knowledge deficit related to healthy behaviors that support weight management post weight loss surgery as evidenced by Body mass index is 67.31 kg/m². Nutrition Assessment of Goal Attainment:  TREATMENT GOALS:    1. Pt  Completed 3 out of 3 goals. 2.TREATMENT GOALS FOR UPCOMING WEEK: continue all previous goals and add: # 13, 14    All goals were planned with and agreed on by the patient. I want to improve my health because I want to be more active  appt # NA G What is your next step? C 1 2 3 4 5 6 7 8 9     0  1 I will read the education binder provided to me and the   x 100               2 I will make my pschological evaluation appoinment. x              1  3 I will bring this goal card to every appointment. 100              x 4 I will eliminate all tobacco/nicotine. x 5 I will limit alcoholic beverages to 0-0RF per week. 6 I will limit dining out to 3 times per week or less. x 7 I will eliminate sugary beverages. x 8 I will eliminate carbonated beverages. 9 I will eliminate drinking with a straw. 10 I will limit caffeinated beverages to 16oz daily. 1  11 I will limit log my exercise daily.   100 12 I will determine my calcium and mvi plan. 1  13 I will have 1-2 servings of lean protein present at each meal and minimeal.               1  14 I will eat every 3-5 hours. 15 I will drink 64oz of fluid daily. 16 I will eat slowly during meals and snacks. 17 I will limit fluids 4oz before after and during meals. 18 I will eat protein first at all meals followed by vegetables,  Fruit and lastly whole grains. 23 My first weight neutral approach is:                 20 My second weight neutral approach is:                 21  My Thirds weight neutral approach is:                 22                  23                  24                    Questions asked for goal understanding:                                                  Do you understand your goals? Do you have the information you need to achieve your goals? Do you have any questions  right now? [x]  Consistent goal achievement in the program thus far and further success with goals is expected. []  Unable to consistently make progress in goal achievement. At this time patient is not moving forward  in developing the skills needed for success after surgery. Plan:    Continue to follow monthly and review goals.          [x]  Nutrition visits complete     []

## 2021-12-27 ENCOUNTER — HOSPITAL ENCOUNTER (OUTPATIENT)
Age: 46
Setting detail: SPECIMEN
Discharge: HOME | End: 2021-12-27

## 2021-12-27 DIAGNOSIS — J45.20 MILD INTERMITTENT ASTHMA WITHOUT COMPLICATION: ICD-10-CM

## 2021-12-27 DIAGNOSIS — K21.9 GASTROESOPHAGEAL REFLUX DISEASE, UNSPECIFIED WHETHER ESOPHAGITIS PRESENT: ICD-10-CM

## 2021-12-27 DIAGNOSIS — I89.0 LYMPHEDEMA: ICD-10-CM

## 2021-12-27 DIAGNOSIS — I10 ESSENTIAL HYPERTENSION, BENIGN: ICD-10-CM

## 2021-12-27 DIAGNOSIS — E66.01 MORBID OBESITY (HCC): ICD-10-CM

## 2021-12-27 DIAGNOSIS — R73.03 PREDIABETES: ICD-10-CM

## 2021-12-27 DIAGNOSIS — M19.90 ARTHRITIS: ICD-10-CM

## 2021-12-27 LAB
ABSOLUTE EOS #: 0.17 K/UL (ref 0–0.44)
ABSOLUTE IMMATURE GRANULOCYTE: 0.03 K/UL (ref 0–0.3)
ABSOLUTE LYMPH #: 2.44 K/UL (ref 1.1–3.7)
ABSOLUTE MONO #: 0.69 K/UL (ref 0.1–1.2)
ALBUMIN SERPL-MCNC: 4.5 G/DL (ref 3.5–5.2)
ALBUMIN/GLOBULIN RATIO: 1.6 (ref 1–2.5)
ALP BLD-CCNC: 59 U/L (ref 35–104)
ALT SERPL-CCNC: 17 U/L (ref 5–33)
ANION GAP SERPL CALCULATED.3IONS-SCNC: 14 MMOL/L (ref 9–17)
AST SERPL-CCNC: 15 U/L
BASOPHILS # BLD: 0 % (ref 0–2)
BASOPHILS ABSOLUTE: 0.03 K/UL (ref 0–0.2)
BILIRUB SERPL-MCNC: 0.39 MG/DL (ref 0.3–1.2)
BUN BLDV-MCNC: 23 MG/DL (ref 6–20)
BUN/CREAT BLD: ABNORMAL (ref 9–20)
CALCIUM SERPL-MCNC: 9.3 MG/DL (ref 8.6–10.4)
CHLORIDE BLD-SCNC: 100 MMOL/L (ref 98–107)
CHOLESTEROL/HDL RATIO: 4.1
CHOLESTEROL: 150 MG/DL
CO2: 23 MMOL/L (ref 20–31)
CREAT SERPL-MCNC: 0.64 MG/DL (ref 0.5–0.9)
DIFFERENTIAL TYPE: NORMAL
EOSINOPHILS RELATIVE PERCENT: 3 % (ref 1–4)
ESTIMATED AVERAGE GLUCOSE: 108 MG/DL
FOLATE: 6.2 NG/ML
GFR AFRICAN AMERICAN: >60 ML/MIN
GFR NON-AFRICAN AMERICAN: >60 ML/MIN
GFR SERPL CREATININE-BSD FRML MDRD: ABNORMAL ML/MIN/{1.73_M2}
GFR SERPL CREATININE-BSD FRML MDRD: ABNORMAL ML/MIN/{1.73_M2}
GLUCOSE BLD-MCNC: 91 MG/DL (ref 70–99)
HBA1C MFR BLD: 5.4 % (ref 4–6)
HCT VFR BLD CALC: 41.7 % (ref 36.3–47.1)
HDLC SERPL-MCNC: 37 MG/DL
HEMOGLOBIN: 13.1 G/DL (ref 11.9–15.1)
IMMATURE GRANULOCYTES: 0 %
IRON SATURATION: 18 % (ref 20–55)
IRON: 62 UG/DL (ref 37–145)
LDL CHOLESTEROL: 81 MG/DL (ref 0–130)
LYMPHOCYTES # BLD: 36 % (ref 24–43)
MAGNESIUM: 2 MG/DL (ref 1.6–2.6)
MCH RBC QN AUTO: 27.4 PG (ref 25.2–33.5)
MCHC RBC AUTO-ENTMCNC: 31.4 G/DL (ref 28.4–34.8)
MCV RBC AUTO: 87.2 FL (ref 82.6–102.9)
MONOCYTES # BLD: 10 % (ref 3–12)
NRBC AUTOMATED: 0 PER 100 WBC
PDW BLD-RTO: 13.4 % (ref 11.8–14.4)
PLATELET # BLD: 225 K/UL (ref 138–453)
PLATELET ESTIMATE: NORMAL
PMV BLD AUTO: 10.6 FL (ref 8.1–13.5)
POTASSIUM SERPL-SCNC: 4.5 MMOL/L (ref 3.7–5.3)
PTH INTACT: 51.46 PG/ML (ref 15–65)
RBC # BLD: 4.78 M/UL (ref 3.95–5.11)
RBC # BLD: NORMAL 10*6/UL
SEG NEUTROPHILS: 51 % (ref 36–65)
SEGMENTED NEUTROPHILS ABSOLUTE COUNT: 3.37 K/UL (ref 1.5–8.1)
SODIUM BLD-SCNC: 137 MMOL/L (ref 135–144)
TOTAL IRON BINDING CAPACITY: 349 UG/DL (ref 250–450)
TOTAL PROTEIN: 7.3 G/DL (ref 6.4–8.3)
TRIGL SERPL-MCNC: 159 MG/DL
TSH SERPL DL<=0.05 MIU/L-ACNC: 1.61 MIU/L (ref 0.3–5)
UNSATURATED IRON BINDING CAPACITY: 287 UG/DL (ref 112–347)
VITAMIN B-12: 422 PG/ML (ref 232–1245)
VITAMIN D 25-HYDROXY: 7.6 NG/ML (ref 30–100)
VLDLC SERPL CALC-MCNC: ABNORMAL MG/DL (ref 1–30)
WBC # BLD: 6.7 K/UL (ref 3.5–11.3)
WBC # BLD: NORMAL 10*3/UL

## 2021-12-28 DIAGNOSIS — E55.9 VITAMIN D DEFICIENCY: Primary | ICD-10-CM

## 2021-12-28 RX ORDER — ERGOCALCIFEROL 1.25 MG/1
50000 CAPSULE ORAL WEEKLY
Qty: 12 CAPSULE | Refills: 0 | Status: SHIPPED | OUTPATIENT
Start: 2021-12-28 | End: 2022-04-13 | Stop reason: ALTCHOICE

## 2021-12-29 LAB — ZINC: 85.2 UG/DL (ref 60–120)

## 2021-12-30 ENCOUNTER — APPOINTMENT (OUTPATIENT)
Dept: CT IMAGING | Facility: CLINIC | Age: 46
End: 2021-12-30
Payer: COMMERCIAL

## 2021-12-30 ENCOUNTER — HOSPITAL ENCOUNTER (EMERGENCY)
Facility: CLINIC | Age: 46
Discharge: HOME | End: 2021-12-30
Attending: EMERGENCY MEDICINE
Payer: COMMERCIAL

## 2021-12-30 VITALS
DIASTOLIC BLOOD PRESSURE: 61 MMHG | OXYGEN SATURATION: 96 % | HEART RATE: 99 BPM | HEIGHT: 61 IN | SYSTOLIC BLOOD PRESSURE: 133 MMHG | WEIGHT: 293 LBS | TEMPERATURE: 98 F | RESPIRATION RATE: 20 BRPM | BODY MASS INDEX: 55.32 KG/M2

## 2021-12-30 DIAGNOSIS — R10.13 ABDOMINAL PAIN, EPIGASTRIC: Primary | ICD-10-CM

## 2021-12-30 LAB
ABSOLUTE EOS #: 0.2 K/UL (ref 0–0.4)
ABSOLUTE IMMATURE GRANULOCYTE: NORMAL K/UL (ref 0–0.3)
ABSOLUTE LYMPH #: 2 K/UL (ref 1–4.8)
ABSOLUTE MONO #: 0.8 K/UL (ref 0.1–1.2)
ALBUMIN SERPL-MCNC: 4 G/DL (ref 3.5–5.2)
ALBUMIN/GLOBULIN RATIO: 1.5 (ref 1–2.5)
ALP BLD-CCNC: 63 U/L (ref 35–104)
ALT SERPL-CCNC: 15 U/L (ref 5–33)
ANION GAP SERPL CALCULATED.3IONS-SCNC: 11 MMOL/L (ref 9–17)
AST SERPL-CCNC: 12 U/L
BASOPHILS # BLD: 0 % (ref 0–2)
BASOPHILS ABSOLUTE: 0 K/UL (ref 0–0.2)
BILIRUB SERPL-MCNC: 0.3 MG/DL (ref 0.3–1.2)
BUN BLDV-MCNC: 23 MG/DL (ref 6–20)
BUN/CREAT BLD: ABNORMAL (ref 9–20)
CALCIUM SERPL-MCNC: 9.2 MG/DL (ref 8.6–10.4)
CHLORIDE BLD-SCNC: 102 MMOL/L (ref 98–107)
CO2: 26 MMOL/L (ref 20–31)
CREAT SERPL-MCNC: 0.9 MG/DL (ref 0.5–0.9)
DIFFERENTIAL TYPE: NORMAL
EOSINOPHILS RELATIVE PERCENT: 3 % (ref 1–4)
GFR AFRICAN AMERICAN: >60 ML/MIN
GFR NON-AFRICAN AMERICAN: >60 ML/MIN
GFR SERPL CREATININE-BSD FRML MDRD: ABNORMAL ML/MIN/{1.73_M2}
GFR SERPL CREATININE-BSD FRML MDRD: ABNORMAL ML/MIN/{1.73_M2}
GLUCOSE BLD-MCNC: 115 MG/DL (ref 70–99)
HCG QUALITATIVE: NEGATIVE
HCT VFR BLD CALC: 39.3 % (ref 36–46)
HEMOGLOBIN: 13.1 G/DL (ref 12–16)
IMMATURE GRANULOCYTES: NORMAL %
LACTIC ACID: 1.2 MMOL/L (ref 0.5–2.2)
LIPASE: 27 U/L (ref 13–60)
LYMPHOCYTES # BLD: 28 % (ref 24–44)
MCH RBC QN AUTO: 27.8 PG (ref 26–34)
MCHC RBC AUTO-ENTMCNC: 33.3 G/DL (ref 31–37)
MCV RBC AUTO: 83.7 FL (ref 80–100)
MONOCYTES # BLD: 11 % (ref 2–11)
NRBC AUTOMATED: NORMAL PER 100 WBC
PDW BLD-RTO: 14.2 % (ref 12.5–15.4)
PLATELET # BLD: 210 K/UL (ref 140–450)
PLATELET ESTIMATE: NORMAL
PMV BLD AUTO: 7.9 FL (ref 6–12)
POTASSIUM SERPL-SCNC: 4.4 MMOL/L (ref 3.7–5.3)
RBC # BLD: 4.7 M/UL (ref 4–5.2)
RBC # BLD: NORMAL 10*6/UL
SEG NEUTROPHILS: 58 % (ref 36–66)
SEGMENTED NEUTROPHILS ABSOLUTE COUNT: 4.3 K/UL (ref 1.8–7.7)
SODIUM BLD-SCNC: 139 MMOL/L (ref 135–144)
TOTAL PROTEIN: 6.6 G/DL (ref 6.4–8.3)
TROPONIN INTERP: NORMAL
TROPONIN T: NORMAL NG/ML
TROPONIN, HIGH SENSITIVITY: <6 NG/L (ref 0–14)
VITAMIN B1 WHOLE BLOOD: 80 NMOL/L (ref 70–180)
WBC # BLD: 7.4 K/UL (ref 3.5–11)
WBC # BLD: NORMAL 10*3/UL

## 2021-12-30 PROCEDURE — 84703 CHORIONIC GONADOTROPIN ASSAY: CPT

## 2021-12-30 PROCEDURE — 2580000003 HC RX 258: Performed by: EMERGENCY MEDICINE

## 2021-12-30 PROCEDURE — 96374 THER/PROPH/DIAG INJ IV PUSH: CPT

## 2021-12-30 PROCEDURE — 99285 EMERGENCY DEPT VISIT HI MDM: CPT

## 2021-12-30 PROCEDURE — 84484 ASSAY OF TROPONIN QUANT: CPT

## 2021-12-30 PROCEDURE — 80053 COMPREHEN METABOLIC PANEL: CPT

## 2021-12-30 PROCEDURE — 83605 ASSAY OF LACTIC ACID: CPT

## 2021-12-30 PROCEDURE — 83690 ASSAY OF LIPASE: CPT

## 2021-12-30 PROCEDURE — 6360000002 HC RX W HCPCS: Performed by: EMERGENCY MEDICINE

## 2021-12-30 PROCEDURE — 85025 COMPLETE CBC W/AUTO DIFF WBC: CPT

## 2021-12-30 PROCEDURE — 6360000004 HC RX CONTRAST MEDICATION: Performed by: EMERGENCY MEDICINE

## 2021-12-30 PROCEDURE — 36415 COLL VENOUS BLD VENIPUNCTURE: CPT

## 2021-12-30 PROCEDURE — 74177 CT ABD & PELVIS W/CONTRAST: CPT

## 2021-12-30 RX ORDER — SODIUM CHLORIDE 0.9 % (FLUSH) 0.9 %
10 SYRINGE (ML) INJECTION PRN
Status: DISCONTINUED | OUTPATIENT
Start: 2021-12-30 | End: 2021-12-30 | Stop reason: HOSPADM

## 2021-12-30 RX ORDER — IPRATROPIUM BROMIDE AND ALBUTEROL SULFATE 2.5; .5 MG/3ML; MG/3ML
1 SOLUTION RESPIRATORY (INHALATION)
Status: DISCONTINUED | OUTPATIENT
Start: 2021-12-30 | End: 2021-12-30

## 2021-12-30 RX ORDER — 0.9 % SODIUM CHLORIDE 0.9 %
80 INTRAVENOUS SOLUTION INTRAVENOUS ONCE
Status: COMPLETED | OUTPATIENT
Start: 2021-12-30 | End: 2021-12-30

## 2021-12-30 RX ORDER — KETOROLAC TROMETHAMINE 30 MG/ML
30 INJECTION, SOLUTION INTRAMUSCULAR; INTRAVENOUS ONCE
Status: COMPLETED | OUTPATIENT
Start: 2021-12-30 | End: 2021-12-30

## 2021-12-30 RX ADMIN — Medication 10 ML: at 05:48

## 2021-12-30 RX ADMIN — IOPAMIDOL 75 ML: 755 INJECTION, SOLUTION INTRAVENOUS at 05:47

## 2021-12-30 RX ADMIN — KETOROLAC TROMETHAMINE 30 MG: 30 INJECTION, SOLUTION INTRAMUSCULAR; INTRAVENOUS at 04:57

## 2021-12-30 RX ADMIN — SODIUM CHLORIDE 80 ML: 9 INJECTION, SOLUTION INTRAVENOUS at 05:47

## 2021-12-30 ASSESSMENT — PAIN DESCRIPTION - LOCATION: LOCATION: ABDOMEN

## 2021-12-30 ASSESSMENT — PAIN DESCRIPTION - ORIENTATION: ORIENTATION: MID;UPPER

## 2021-12-30 ASSESSMENT — ENCOUNTER SYMPTOMS
EYES NEGATIVE: 1
ABDOMINAL PAIN: 1
RESPIRATORY NEGATIVE: 1
ALLERGIC/IMMUNOLOGIC NEGATIVE: 1

## 2021-12-30 ASSESSMENT — PAIN DESCRIPTION - PAIN TYPE: TYPE: ACUTE PAIN

## 2021-12-30 ASSESSMENT — PAIN SCALES - GENERAL
PAINLEVEL_OUTOF10: 6
PAINLEVEL_OUTOF10: 0

## 2021-12-30 NOTE — ED PROVIDER NOTES
ADDENDUM:      Care of this patient was assumed from Dr Charlet Sandifer  The patient was seen for GI Problem  . The patient's initial evaluation and plan have been discussed with the prior provider who initially evaluated the patient. Nursing Notes, Past Medical Hx, Past Surgical Hx, Social Hx, Allergies, and Family Hx were all reviewed. Diagnostic Results     EKG: All EKG's are interpreted by the Emergency Department Physician who either signs or Co-signs this chart in the absence of a cardiologist.  Sinus rhythm with occasional PVCs rate of 82 bpm AL was 178 ms castration 90 ms QT corrected 434 ms axis is 25 no acute ST elevation or depression seen      RADIOLOGY:All plain film, CT, MRI, and formal ultrasound images (except ED bedside ultrasound) are read by the radiologist and the images and interpretations are reviewed by the emergency physician. Studies:     CT ABDOMEN PELVIS W IV CONTRAST Additional Contrast? None   Final Result   *Negative CT examination of the abdomen pelvis with no evidence of acute   process including normal appendix. *Few incidental/chronic findings as described including well-circumscribed   small hypodense lesion in the left kidney which appear benign likely cysts   though not definitive.              LABS:   Labs Reviewed   COMPREHENSIVE METABOLIC PANEL - Abnormal; Notable for the following components:       Result Value    Glucose 115 (*)     BUN 23 (*)     All other components within normal limits   LIPASE   CBC WITH AUTO DIFFERENTIAL   LACTIC ACID   HCG, SERUM, QUALITATIVE   TROPONIN       RECENT VITALS:  BP: 133/61, Temp: 98 °F (36.7 °C), Pulse: 99, Resp: 20     ED Course     The patient was given the following medications:  Orders Placed This Encounter   Medications    ketorolac (TORADOL) injection 30 mg    0.9 % sodium chloride bolus    iopamidol (ISOVUE-370) 76 % injection 75 mL    sodium chloride flush 0.9 % injection 10 mL    DISCONTD: ipratropium-albuterol (DUONEB) nebulizer solution 1 ampule     Order Specific Question:   Initiate RT Bronchodilator Protocol     Answer:   Yes         Medical Decision Making      CT negative labs all negative I did do an EKG and a troponin which were both    Disposition     CLINICAL IMPRESSION:  1.  Abdominal pain, epigastric        PATIENT REFERRED TO:  Eboni Mauricio  [address] [facility] [id number] [phone]    In 3 days        DISCHARGE MEDICATIONS:  New Prescriptions    No medications on file       DISPOSITION: Discharged in stable condition  Amelia Larry MD  (Please note that portions of this note were completed with a voice recognition program.  Efforts were made to edit the dictations but occasionally words are mis-transcribed.)      Amelia Larry MD  12/30/21 0286

## 2021-12-30 NOTE — ED TRIAGE NOTES
Patient states she is having mid epigastric pain radiates down abdomin started around midnight. Patient states she took gas x and tums with no relief.

## 2021-12-30 NOTE — ED PROVIDER NOTES
eMERGENCY dEPARTMENT eNCOUnter      Pt Name: Maddi Silva  MRN: [id number]  [place] 1975  Date of evaluation: 12/30/2021      CHIEF COMPLAINT       Chief Complaint   Patient presents with    GI Problem          HISTORY OF PRESENT ILLNESS    Moni Norman is a 48 y.o. female who presents emergency department for evaluation of acute onset of midepigastric pain that radiates down mostly the left side of her abdomen at that started around midnight tonight. Patient states it woke her up out of her sleep. She took some Gas-X and Tums and did not get any relief from that. Patient states her pain is about a 6 out of 10 with no palliative provocative. Denies nausea vomiting diarrhea or constipation. Patient has had a uterine ablation in the past.    REVIEW OF SYSTEMS       Review of Systems   Constitutional: Negative. HENT: Negative. Eyes: Negative. Respiratory: Negative. Cardiovascular: Negative. Gastrointestinal: Positive for abdominal pain. Endocrine: Negative. Genitourinary: Negative. Musculoskeletal: Negative. Skin: Negative. Allergic/Immunologic: Negative. Neurological: Negative. Hematological: Negative. Psychiatric/Behavioral: Negative. PAST MEDICAL HISTORY    has a past medical history of Allergic rhinitis, Arthritis, Asthma, Caffeine use, Class 3 obesity with serious comorbidity and body mass index (BMI) of 60.0 to 69.9 in adult, GERD (gastroesophageal reflux disease), Heartburn, Hypertension, Insulin resistance, Intestinal disaccharidase deficiencies and disaccharide malabsorption, Iron deficiency anemia, unspecified, Neural foraminal stenosis of cervical spine, Ringing in ears, Snoring, Urinary incontinence, and Vitamin D deficiency. SURGICAL HISTORY      has a past surgical history that includes Tonsillectomy and Foot surgery.     CURRENT MEDICATIONS       Discharge Medication List as of 12/30/2021  9:14 AM      CONTINUE these medications which have NOT CHANGED    Details   vitamin D (ERGOCALCIFEROL) 1.25 MG (13167 UT) CAPS capsule Take 1 capsule by mouth once a week for 12 doses, Disp-12 capsule, R-0Normal      !! furosemide (LASIX) 40 MG tablet Take 1 tablet by mouth 2 times daily as needed (edema), Disp-60 tablet, R-0Normal      dexamethasone (DECADRON) 1 MG tablet Take as directed x 1 the night before cortisol level checked for dexamethasone suppression test., Disp-1 tablet, R-0Normal      montelukast (SINGULAIR) 10 MG tablet Take 1 tablet by mouth daily, Disp-90 tablet, R-1Normal      pioglitazone (ACTOS) 15 MG tablet TAKE 1 TABLET BY MOUTH ONE TIME A DAY, Disp-90 tablet, R-1Normal      !! furosemide (LASIX) 20 MG tablet Take 1 tablet by mouth 2 times daily as needed (edema), Disp-180 tablet, R-1Normal      famotidine (PEPCID) 40 MG tablet Take 1 tablet by mouth 2 times daily, Disp-180 tablet, R-1Normal      lisinopril (PRINIVIL;ZESTRIL) 20 MG tablet TAKE 1 TABLET BY MOUTH ONE TIME A DAY, Disp-90 tablet, R-1Normal      cetirizine (ZYRTEC) 10 MG tablet TAKE 1 TABLET BY MOUTH ONE TIME A DAY, Disp-90 tablet, R-1Normal      ibuprofen (ADVIL;MOTRIN) 800 MG tablet Take 1 tablet by mouth every 8 hours as needed for Pain, Disp-90 tablet, R-1Normal      albuterol sulfate  (90 Base) MCG/ACT inhaler Inhale 2 puffs into the lungs 4 times daily as needed for Wheezing, Disp-1 Inhaler, R-0Normal      nystatin (MYCOSTATIN) 639185 UNIT/GM powder APPLY EXTERNALLY THREE TIMES A DAY , Disp-60 g, R-0, Normal       !! - Potential duplicate medications found. Please discuss with provider. ALLERGIES     is allergic to dicloxacillin and metformin and related. FAMILY HISTORY     She indicated that her mother is . She indicated that her father is . She indicated that her brother is alive. She indicated that the status of her maternal grandmother is unknown.  She indicated that the status of her other is unknown.     family history includes COPD in her father; Cancer in her brother and mother; Depression in an other family member; Diabetes in her father; Heart Disease in her maternal grandmother; High Blood Pressure in her brother, father, mother, and another family member; Latosha Livers in her mother; Thyroid Cancer in her brother. SOCIAL HISTORY      reports that she has never smoked. She has never used smokeless tobacco. She reports that she does not drink alcohol and does not use drugs. PHYSICAL EXAM     INITIAL VITALS:  height is 5' 1\" (1.549 m) and weight is 368 lb (166.9 kg) (abnormal). Her oral temperature is 98 °F (36.7 °C). Her blood pressure is 133/61 and her pulse is 99. Her respiration is 20 and oxygen saturation is 96%. Constitutional: Alert, oriented x3, nontoxic, afebrile, answering questions appropriately, acting properly for age, in no acute distress  HEENT: Extraocular muscles intact, mucus membranes moist, TMs clear bilaterally, no posterior pharyngeal erythema or exudates, Pupils equal, round, reactive to light,   Neck: Trachea midline, Supple without lymphadenopathy, no posterior midline neck tenderness to palpation  Cardiovascular: Regular rhythm and rate no S3, S4, or murmurs  Respiratory: Clear to auscultation bilaterally no wheezes, rhonchi, rales, no respiratory distress  Gastrointestinal: Soft, nontender, nondistended, positive bowel sounds. No rebound, rigidity, or guarding. Musculoskeletal: No extremity pain or swelling  Neurologic: Moving all 4 extremities without difficulty there are no gross focal neurologic deficits  Skin: Warm and dry      DIFFERENTIAL DIAGNOSIS/ MDM:     Abdominal pain uncertain etiology patient will get some laboratories she will be medicated were going to go ahead and sent for CAT scan.     DIAGNOSTIC RESULTS     EKG: All EKG's are interpreted by the Emergency Department Physician who either signs or Co-signs this chart in the absence of a cardiologist.        Not indicated unless otherwise documented above    LABS:  Results for orders placed or performed during the hospital encounter of 12/30/21   Comprehensive Metabolic Panel   Result Value Ref Range    Glucose 115 (H) 70 - 99 mg/dL    BUN 23 (H) 6 - 20 mg/dL    CREATININE 0.90 0.50 - 0.90 mg/dL    Bun/Cre Ratio NOT REPORTED 9 - 20    Calcium 9.2 8.6 - 10.4 mg/dL    Sodium 139 135 - 144 mmol/L    Potassium 4.4 3.7 - 5.3 mmol/L    Chloride 102 98 - 107 mmol/L    CO2 26 20 - 31 mmol/L    Anion Gap 11 9 - 17 mmol/L    Alkaline Phosphatase 63 35 - 104 U/L    ALT 15 5 - 33 U/L    AST 12 <32 U/L    Total Bilirubin 0.30 0.3 - 1.2 mg/dL    Total Protein 6.6 6.4 - 8.3 g/dL    Albumin 4.0 3.5 - 5.2 g/dL    Albumin/Globulin Ratio 1.5 1.0 - 2.5    GFR Non-African American >60 >60 mL/min    GFR African American >60 >60 mL/min    GFR Comment          GFR Staging NOT REPORTED    Lipase   Result Value Ref Range    Lipase 27 13 - 60 U/L   CBC Auto Differential   Result Value Ref Range    WBC 7.4 3.5 - 11.0 k/uL    RBC 4.70 4.0 - 5.2 m/uL    Hemoglobin 13.1 12.0 - 16.0 g/dL    Hematocrit 39.3 36 - 46 %    MCV 83.7 80 - 100 fL    MCH 27.8 26 - 34 pg    MCHC 33.3 31 - 37 g/dL    RDW 14.2 12.5 - 15.4 %    Platelets 847 543 - 777 k/uL    MPV 7.9 6.0 - 12.0 fL    NRBC Automated NOT REPORTED per 100 WBC    Differential Type NOT REPORTED     Seg Neutrophils 58 36 - 66 %    Lymphocytes 28 24 - 44 %    Monocytes 11 2 - 11 %    Eosinophils % 3 1 - 4 %    Basophils 0 0 - 2 %    Immature Granulocytes NOT REPORTED 0 %    Segs Absolute 4.30 1.8 - 7.7 k/uL    Absolute Lymph # 2.00 1.0 - 4.8 k/uL    Absolute Mono # 0.80 0.1 - 1.2 k/uL    Absolute Eos # 0.20 0.0 - 0.4 k/uL    Basophils Absolute 0.00 0.0 - 0.2 k/uL    Absolute Immature Granulocyte NOT REPORTED 0.00 - 0.30 k/uL    WBC Morphology NOT REPORTED     RBC Morphology NOT REPORTED     Platelet Estimate NOT REPORTED    Lactic Acid   Result Value Ref Range    Lactic Acid 1.2 0.5 - 2.2 mmol/L   HCG Qualitative, Serum   Result Value Ref Range    hCG Qual NEGATIVE NEGATIVE   Troponin   Result Value Ref Range    Troponin, High Sensitivity <6 0 - 14 ng/L    Troponin T NOT REPORTED <0.03 ng/mL    Troponin Interp NOT REPORTED    EKG 12 Lead   Result Value Ref Range    Ventricular Rate 82 BPM    Atrial Rate 82 BPM    P-R Interval 178 ms    QRS Duration 90 ms    Q-T Interval 372 ms    QTc Calculation (Bazett) 434 ms    P Axis 52 degrees    R Axis 25 degrees    T Axis 49 degrees       Not indicated unless otherwise documented above    RADIOLOGY:   I reviewed the radiologist interpretations:  CT ABDOMEN PELVIS W IV CONTRAST Additional Contrast? None   Final Result   *Negative CT examination of the abdomen pelvis with no evidence of acute   process including normal appendix. *Few incidental/chronic findings as described including well-circumscribed   small hypodense lesion in the left kidney which appear benign likely cysts   though not definitive.              Not indicated unless otherwise documented above    EMERGENCY DEPARTMENT COURSE:     The patient was given the following medications:  Orders Placed This Encounter   Medications    ketorolac (TORADOL) injection 30 mg    0.9 % sodium chloride bolus    iopamidol (ISOVUE-370) 76 % injection 75 mL    DISCONTD: sodium chloride flush 0.9 % injection 10 mL    DISCONTD: ipratropium-albuterol (DUONEB) nebulizer solution 1 ampule     Order Specific Question:   Initiate RT Bronchodilator Protocol     Answer:   Yes        Vitals:    Vitals:    12/30/21 0425 12/30/21 0528   BP: 133/61    Pulse: 99    Resp:  20   Temp:  98 °F (36.7 °C)   TempSrc:  Oral   SpO2: 96%    Weight: (!) 368 lb (166.9 kg)    Height: 5' 1\" (1.549 m)      -------------------------  /61   Pulse 99   Temp 98 °F (36.7 °C) (Oral)   Resp 20   Ht 5' 1\" (1.549 m)   Wt (!) 368 lb (166.9 kg)   SpO2 96%   BMI 69.53 kg/m²         I have reviewed the disposition diagnosis with the patient and or their family/guardian. I have answered their questions and given discharge instructions. They voiced understanding of these instructions and did not have any further questions or complaints. CRITICAL CARE:    None    CONSULTS:    None    PROCEDURES:    None      OARRS Report if indicated             FINAL IMPRESSION      1.  Abdominal pain, epigastric          DISPOSITION/PLAN   DISPOSITION Decision To Discharge      PATIENT REFERRED TO:  Clydia Scheuermann, DO [address] [id number]  [phone]    In 3 days        DISCHARGE MEDICATIONS:  Discharge Medication List as of 12/30/2021  9:14 AM          (Please note that portions of this note were completed with a voice recognition program.  Efforts were made to edit the dictations but occasionally words are mis-transcribed.)    Shayan Tam MD,, MD  Attending Emergency Physician           Shayan Tam MD  01/01/22 8990 Gladys Delgadillo III, MD  01/01/22 0570

## 2021-12-31 LAB
RETINYL PALMITATE: <0.02 MG/L (ref 0–0.1)
VITAMIN A LEVEL: 0.84 MG/L (ref 0.3–1.2)
VITAMIN A, INTERP: NORMAL

## 2022-01-03 LAB
EKG ATRIAL RATE: 82 BPM
EKG P AXIS: 52 DEGREES
EKG P-R INTERVAL: 178 MS
EKG Q-T INTERVAL: 372 MS
EKG QRS DURATION: 90 MS
EKG QTC CALCULATION (BAZETT): 434 MS
EKG R AXIS: 25 DEGREES
EKG T AXIS: 49 DEGREES
EKG VENTRICULAR RATE: 82 BPM

## 2022-01-07 ENCOUNTER — HOSPITAL ENCOUNTER (OUTPATIENT)
Dept: SLEEP CENTER | Age: 47
Discharge: HOME | End: 2022-01-09
Payer: COMMERCIAL

## 2022-01-07 DIAGNOSIS — R06.83 SNORING: ICD-10-CM

## 2022-01-07 PROCEDURE — 95810 POLYSOM 6/> YRS 4/> PARAM: CPT

## 2022-01-08 VITALS
RESPIRATION RATE: 16 BRPM | WEIGHT: 293 LBS | OXYGEN SATURATION: 93 % | HEART RATE: 73 BPM | HEIGHT: 61 IN | BODY MASS INDEX: 55.32 KG/M2

## 2022-01-17 ENCOUNTER — OFFICE VISIT (OUTPATIENT)
Dept: BARIATRICS/WEIGHT MGMT | Age: 47
End: 2022-01-17
Payer: COMMERCIAL

## 2022-01-17 VITALS
DIASTOLIC BLOOD PRESSURE: 70 MMHG | WEIGHT: 293 LBS | SYSTOLIC BLOOD PRESSURE: 120 MMHG | HEIGHT: 61 IN | HEART RATE: 80 BPM | BODY MASS INDEX: 55.32 KG/M2

## 2022-01-17 DIAGNOSIS — I10 ESSENTIAL HYPERTENSION, BENIGN: Primary | ICD-10-CM

## 2022-01-17 DIAGNOSIS — M19.90 ARTHRITIS: ICD-10-CM

## 2022-01-17 DIAGNOSIS — E66.01 MORBID OBESITY (HCC): ICD-10-CM

## 2022-01-17 DIAGNOSIS — R73.03 PREDIABETES: ICD-10-CM

## 2022-01-17 DIAGNOSIS — J45.20 MILD INTERMITTENT ASTHMA WITHOUT COMPLICATION: ICD-10-CM

## 2022-01-17 DIAGNOSIS — K21.9 GASTROESOPHAGEAL REFLUX DISEASE, UNSPECIFIED WHETHER ESOPHAGITIS PRESENT: ICD-10-CM

## 2022-01-17 DIAGNOSIS — I89.0 LYMPHEDEMA: ICD-10-CM

## 2022-01-17 PROCEDURE — 99213 OFFICE O/P EST LOW 20 MIN: CPT | Performed by: NURSE PRACTITIONER

## 2022-01-17 NOTE — PROGRESS NOTES
Medical Weight Management Progress Note    Subjective      Patient being seen for medically supervised weight loss for the chronic conditions of HTN, GERD, Asthma, Prediabetes, Arthritis, Lymphedema. She is working on the behavior changes discussed at the initial appointment. Patient continues on diet plan. Physical activity includes yoga ball. Weight loss since last visit is 3 lbs. Sleep study completed 1/7/22 and pending report. Psych eval completed and clearance received. Needs to schedule EGD. No current issues. Working toward bariatric surgery:    [x] Sleeve Gastrectomy                                                           [] Abhay-en-Y Gastric Bypass    Allergies: Allergies   Allergen Reactions    Dicloxacillin     Metformin And Related Diarrhea       Past Medical History:     Past Medical History:   Diagnosis Date    Allergic rhinitis     Arthritis     Asthma     Caffeine use     1 coffee, 1-3 tea daily    Class 3 obesity with serious comorbidity and body mass index (BMI) of 60.0 to 69.9 in adult 1/18/2018    GERD (gastroesophageal reflux disease)     Heartburn     Hypertension     Insulin resistance 9/9/2014    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     Past Surgical History:  Past Surgical History:   Procedure Laterality Date    FOOT SURGERY      TONSILLECTOMY         Family History:  Family History   Problem Relation Age of Onset    High Blood Pressure Mother     Lung Cancer Mother     Cancer Mother     High Blood Pressure Father     Diabetes Father     COPD Father     High Blood Pressure Brother     Thyroid Cancer Brother     Cancer Brother     Heart Disease Maternal Grandmother     Depression Other     High Blood Pressure Other Social History:  Social History     Socioeconomic History    Marital status: Single     Spouse name: Not on file    Number of children: Not on file    Years of education: Not on file    Highest education level: Not on file   Occupational History    Not on file   Tobacco Use    Smoking status: Never Smoker    Smokeless tobacco: Never Used   Substance and Sexual Activity    Alcohol use: No    Drug use: No    Sexual activity: Yes     Partners: Male   Other Topics Concern    Not on file   Social History Narrative    Not on file     Social Determinants of Health     Financial Resource Strain: Low Risk     Difficulty of Paying Living Expenses: Not hard at all   Food Insecurity: No Food Insecurity    Worried About Running Out of Food in the Last Year: Never true    [address] in the Last Year: Never true   Transportation Needs:     Lack of Transportation (Medical): Not on file    Lack of Transportation (Non-Medical):  Not on file   Physical Activity:     Days of Exercise per Week: Not on file    Minutes of Exercise per Session: Not on file   Stress:     Feeling of Stress : Not on file   Social Connections:     Frequency of Communication with Friends and Family: Not on file    Frequency of Social Gatherings with Friends and Family: Not on file    Attends Buddhist Services: Not on file    Active Member of [address] [place] [address] [employer] or Organizations: Not on file    Attends Club or Organization Meetings: Not on file    Marital Status: Not on file   Intimate Partner Violence:     Fear of Current or Ex-Partner: Not on file    Emotionally Abused: Not on file    Physically Abused: Not on file    Sexually Abused: Not on file   Housing Stability:     Unable to Pay for Housing in the Last Year: Not on file    Number of [place] in the Last Year: Not on file    Unstable Housing in the Last Year: Not on file       Current Medications:  Current Outpatient Medications   Medication Sig Dispense Refill    vitamin D (ERGOCALCIFEROL) 1.25 MG (08945 UT) CAPS capsule Take 1 capsule by mouth once a week for 12 doses 12 capsule 0    furosemide (LASIX) 40 MG tablet Take 1 tablet by mouth 2 times daily as needed (edema) 60 tablet 0    montelukast (SINGULAIR) 10 MG tablet Take 1 tablet by mouth daily 90 tablet 1    pioglitazone (ACTOS) 15 MG tablet TAKE 1 TABLET BY MOUTH ONE TIME A DAY 90 tablet 1    furosemide (LASIX) 20 MG tablet Take 1 tablet by mouth 2 times daily as needed (edema) 180 tablet 1    famotidine (PEPCID) 40 MG tablet Take 1 tablet by mouth 2 times daily 180 tablet 1    lisinopril (PRINIVIL;ZESTRIL) 20 MG tablet TAKE 1 TABLET BY MOUTH ONE TIME A DAY 90 tablet 1    cetirizine (ZYRTEC) 10 MG tablet TAKE 1 TABLET BY MOUTH ONE TIME A DAY 90 tablet 1    ibuprofen (ADVIL;MOTRIN) 800 MG tablet Take 1 tablet by mouth every 8 hours as needed for Pain 90 tablet 1    albuterol sulfate  (90 Base) MCG/ACT inhaler Inhale 2 puffs into the lungs 4 times daily as needed for Wheezing 1 Inhaler 0    nystatin (MYCOSTATIN) 452710 UNIT/GM powder APPLY EXTERNALLY THREE TIMES A DAY  60 g 0     Current Facility-Administered Medications   Medication Dose Route Frequency Provider Last Rate Last Admin    methylPREDNISolone acetate (DEPO-MEDROL) injection 80 mg  80 mg IntraMUSCular Once Blair Candler, DO           Vital Signs:  /70 (Site: Right Upper Arm, Position: Sitting, Cuff Size: Large Adult)   Pulse 80   Ht 5' 1\" (1.549 m)   Wt (!) 365 lb (165.6 kg)   BMI 68.97 kg/m²     BMI/Height/Weight:  Body mass index is 68.97 kg/m². Review of Systems - A review of systems was performed. All was negative unless otherwise documented in HPI. Constitutional: Negative for fever, chills and diaphoresis. HENT: Negative for hearing loss and trouble swallowing. Eyes: Negative for photophobia and visual disturbance. Respiratory: Negative for cough, shortness of breath and wheezing.    Cardiovascular: Negative for chest pain and palpitations. Gastrointestinal: Negative for nausea, vomiting, abdominal pain, diarrhea, constipation, blood in stool and abdominal distention. Endocrine: Negative for polydipsia, polyphagia and polyuria. Genitourinary: Negative for dysuria, frequency, hematuria and difficulty urinating. Musculoskeletal: Negative for myalgias, joint swelling. Skin: Negative for pallor and rash. Neurological: Negative for dizziness, tremors, light-headedness and headaches. Psychiatric/Behavioral: Negative for sleep disturbance and dysphoric mood. Objective:      Physical Exam   Vital signs reviewed. General: Well-developed and well-nourished. No acute distress. Skin: Warm, dry and intact. HEENT: Normocephalic. EOMs intact. Conjunctivae normal. Neck supple. Cardiovascular: Normal rate, regular rhythm. Pulmonary/Chest: Normal effort. Lungs clear to auscultation. No rales, rhonchi or wheezing. Abdominal: Positive bowel sounds. Soft, nontender. Nondistended. Musculoskeletal: Movement x4. Lymphedema bilaterally. Neurological: Gait normal. Alert and oriented to person, place, and time. Psychiatric: Normal mood and affect. Speech and behavior normal. Judgment and thought content normal. Cognition and memory intact. Assessment:       Diagnosis Orders   1. Essential hypertension, benign     2. Gastroesophageal reflux disease, unspecified whether esophagitis present     3. Morbid obesity ([address]     4. Mild intermittent asthma without complication     5. Prediabetes     6. Lymphedema     7. Arthritis         Plan:    Dietitian visit today. Patient was encouraged to journal all food intake. Keep calorie level at approximately 0167-9362. Protein intake is to be a minimum of 60-80 grams per day. Water drinking was encouraged with a goal of 64oz-128oz daily. Beverages to be calorie free except for milk. Every other beverage should be water. Avoid soda.    Continue to increase level of physical activity. Encouraged use of exercise log. Labs reviewed and discussed with patient. Vitamin D level low and already treated. Follow-up  Return in about 1 month (around 2/17/2022). Orders this encounter:  No orders of the defined types were placed in this encounter. Prescriptions this encounter:  No orders of the defined types were placed in this encounter.       Electronically signed by:  Hung Devine CNP

## 2022-01-17 NOTE — PROGRESS NOTES
Medical Nutrition Therapy   Metabolic and Bariatric Surgery         Supervised diet and exercise preparation  Visit 2 out of 6  Pt reports:      Changes in eating patterns to promote health are noted below on the goals number 19-22    Vitals: Wt Readings from Last 3 Encounters:   01/17/22 (!) 365 lb (165.6 kg)   01/08/22 (!) 368 lb (166.9 kg)   12/30/21 (!) 368 lb (166.9 kg)         Nutrition Assessment:   PES: Knowledge deficit related to healthy behaviors that support weight management post weight loss surgery as evidenced by Body mass index is 68.97 kg/m². Nutrition Assessment of Goal Attainment:  TREATMENT GOALS:    1. Pt  Completed 2 out of 4 goals. 2.TREATMENT GOALS FOR UPCOMING WEEK: continue all previous goals and add: # 12    All goals were planned with and agreed on by the patient. I want to improve my health because I want to be more active  appt # NA G What is your next step? C 1 2 3 4 5 6 7 8 9     0  1 I will read the education binder provided to me and the   x 100               2 I will make my pschological evaluation appoinment. x              2  3 I will bring this goal card to every appointment. 100 100             x 4 I will eliminate all tobacco/nicotine. x 5 I will limit alcoholic beverages to 7-6GW per week. 6 I will limit dining out to 3 times per week or less. x 7 I will eliminate sugary beverages. x 8 I will eliminate carbonated beverages. x 9 I will eliminate drinking with a straw. 10 I will limit caffeinated beverages to 16oz daily. 2  11 I will limit log my exercise daily. 100 100            2  12 I will determine my calcium and mvi plan. 2  13 I will have 1-2 servings of lean protein present at each meal and minimeal.   50            2  14 I will eat every 3-5 hours. 50              15 I will drink 64oz of fluid daily.                  16 I will eat slowly during meals and snacks. 17 I will limit fluids 4oz before after and during meals. 18 I will eat protein first at all meals followed by vegetables,  Fruit and lastly whole grains. 23 My first weight neutral approach is:                 20 My second weight neutral approach is:                 21  My Thirds weight neutral approach is:                 22                  23                  24                      Questions asked for goal understanding:                                                  Do you understand your goals? Do you have the information you need to achieve your goals? Do you have any questions  right now? [x]  Consistent goal achievement in the program thus far and further success with goals is expected. []  Unable to consistently make progress in goal achievement. At this time patient is not moving forward  in developing the skills needed for success after surgery. Plan:    Continue to follow monthly and review goals.          [x]  Nutrition visits complete    []

## 2022-01-25 LAB — STATUS: NORMAL

## 2022-02-01 ENCOUNTER — TELEPHONE (OUTPATIENT)
Dept: BARIATRICS/WEIGHT MGMT | Age: 47
End: 2022-02-01

## 2022-02-01 DIAGNOSIS — G47.33 OBSTRUCTIVE SLEEP APNEA: Primary | ICD-10-CM

## 2022-02-01 NOTE — TELEPHONE ENCOUNTER
----- Message from Cesar Camejo DO sent at 2/1/2022 11:30 AM EST -----    ----- Message -----  From: Carol Flores  Sent: 1/31/2022   3:30 PM EST  To: Cesar Camejo, DO                Your patient had his sleep study done and showed positive for Moderate Obstructive Sleep Apnea, G47.33.  We will need an order for a 84563 Polysomnogram with CPAP (SLE1 in Epic) for him/her to return for a 2nd night sleep study to be set-up for treatment.   Please advise asap.     If you have any questions please feel free to call our office at [phone]. Shara Montanae you!

## 2022-02-16 ENCOUNTER — OFFICE VISIT (OUTPATIENT)
Dept: BARIATRICS/WEIGHT MGMT | Age: 47
End: 2022-02-16
Payer: COMMERCIAL

## 2022-02-16 VITALS
WEIGHT: 293 LBS | HEART RATE: 79 BPM | DIASTOLIC BLOOD PRESSURE: 70 MMHG | HEIGHT: 62 IN | SYSTOLIC BLOOD PRESSURE: 141 MMHG | BODY MASS INDEX: 53.92 KG/M2

## 2022-02-16 DIAGNOSIS — K21.9 GASTROESOPHAGEAL REFLUX DISEASE, UNSPECIFIED WHETHER ESOPHAGITIS PRESENT: ICD-10-CM

## 2022-02-16 DIAGNOSIS — I10 ESSENTIAL HYPERTENSION, BENIGN: Primary | ICD-10-CM

## 2022-02-16 DIAGNOSIS — I89.0 LYMPHEDEMA: ICD-10-CM

## 2022-02-16 DIAGNOSIS — G47.33 OSA (OBSTRUCTIVE SLEEP APNEA): ICD-10-CM

## 2022-02-16 DIAGNOSIS — M19.90 ARTHRITIS: ICD-10-CM

## 2022-02-16 DIAGNOSIS — R73.03 PREDIABETES: ICD-10-CM

## 2022-02-16 DIAGNOSIS — J45.20 MILD INTERMITTENT ASTHMA WITHOUT COMPLICATION: ICD-10-CM

## 2022-02-16 DIAGNOSIS — E66.01 MORBID OBESITY (HCC): ICD-10-CM

## 2022-02-16 PROCEDURE — 99213 OFFICE O/P EST LOW 20 MIN: CPT | Performed by: NURSE PRACTITIONER

## 2022-02-16 NOTE — PROGRESS NOTES
Medical Nutrition Therapy   Metabolic and Bariatric Surgery         Supervised diet and exercise preparation  Visit 3 out of 6  Pt reports:      Changes in eating patterns to promote health are noted below on the goals number 19-22    Vitals: Wt Readings from Last 3 Encounters:   02/16/22 (!) 371 lb (168.3 kg)   01/17/22 (!) 365 lb (165.6 kg)   01/08/22 (!) 368 lb (166.9 kg)         Nutrition Assessment:   PES: Knowledge deficit related to healthy behaviors that support weight management post weight loss surgery as evidenced by Body mass index is 67.86 kg/m². Nutrition Assessment of Goal Attainment:  TREATMENT GOALS:    1. Pt  Completed 5 out of 5 goals. 2.TREATMENT GOALS FOR UPCOMING WEEK: continue all previous goals and add: # 6    All goals were planned with and agreed on by the patient. I want to improve my health because I want to be more active  appt # NA G What is your next step? C 1 2 3 4 5 6 7 8 9     0  1 I will read the education binder provided to me and the   x 100               2 I will make my pschological evaluation appoinment. x              3  3 I will bring this goal card to every appointment. 100 100 100            x 4 I will eliminate all tobacco/nicotine. x 5 I will limit alcoholic beverages to 9-2CV per week. 3  6 I will limit dining out to 3 times per week or less. x 7 I will eliminate sugary beverages. x 8 I will eliminate carbonated beverages. x 9 I will eliminate drinking with a straw. 10 I will limit caffeinated beverages to 16oz daily. 3  11 I will limit log my exercise daily. 100 100 100           2  12 I will determine my calcium and mvi plan. x   100           2  13 I will have 1-2 servings of lean protein present at each meal and minimeal. x  50 100           2  14 I will eat every 3-5 hours. x  50 100             15 I will drink 64oz of fluid daily. 16 I will eat slowly during meals and snacks. 17 I will limit fluids 4oz before after and during meals. 18 I will eat protein first at all meals followed by vegetables,  Fruit and lastly whole grains. 23 My first weight neutral approach is:                 20 My second weight neutral approach is:                 21  My Thirds weight neutral approach is:                 22                  23                  24                    Questions asked for goal understanding:                                                  Do you understand your goals? Do you have the information you need to achieve your goals? Do you have any questions  right now? [x]  Consistent goal achievement in the program thus far and further success with goals is expected. []  Unable to consistently make progress in goal achievement. At this time patient is not moving forward  in developing the skills needed for success after surgery. Plan:    Continue to follow monthly and review goals.          [x]  Nutrition visits complete    []

## 2022-02-16 NOTE — PROGRESS NOTES
Pressure Other        Social History:  Social History     Socioeconomic History    Marital status: Single     Spouse name: Not on file    Number of children: Not on file    Years of education: Not on file    Highest education level: Not on file   Occupational History    Not on file   Tobacco Use    Smoking status: Never Smoker    Smokeless tobacco: Never Used   Substance and Sexual Activity    Alcohol use: No    Drug use: No    Sexual activity: Yes     Partners: Male   Other Topics Concern    Not on file   Social History Narrative    Not on file     Social Determinants of Health     Financial Resource Strain: Low Risk     Difficulty of Paying Living Expenses: Not hard at all   Food Insecurity: No Food Insecurity    Worried About Running Out of Food in the Last Year: Never true    [address] in the Last Year: Never true   Transportation Needs:     Lack of Transportation (Medical): Not on file    Lack of Transportation (Non-Medical):  Not on file   Physical Activity:     Days of Exercise per Week: Not on file    Minutes of Exercise per Session: Not on file   Stress:     Feeling of Stress : Not on file   Social Connections:     Frequency of Communication with Friends and Family: Not on file    Frequency of Social Gatherings with Friends and Family: Not on file    Attends Oriental orthodox Services: Not on file    Active Member of [address] [place] [address] or Organizations: Not on file    Attends Club or Organization Meetings: Not on file    Marital Status: Not on file   Intimate Partner Violence:     Fear of Current or Ex-Partner: Not on file    Emotionally Abused: Not on file    Physically Abused: Not on file    Sexually Abused: Not on file   Housing Stability:     Unable to Pay for Housing in the Last Year: Not on file    Number of [place] in the Last Year: Not on file    Unstable Housing in the Last Year: Not on file       Current Medications:  Current Outpatient Medications   Medication Sig Dispense Refill    cetirizine (ZYRTEC) 10 MG tablet TAKE 1 TABLET BY MOUTH ONE TIME A DAY 90 tablet 1    montelukast (SINGULAIR) 10 MG tablet Take 1 tablet by mouth daily 90 tablet 1    pioglitazone (ACTOS) 15 MG tablet TAKE 1 TABLET BY MOUTH ONE TIME A DAY 90 tablet 1    furosemide (LASIX) 20 MG tablet Take 1 tablet by mouth 2 times daily as needed (edema) 180 tablet 1    famotidine (PEPCID) 40 MG tablet Take 1 tablet by mouth 2 times daily 180 tablet 1    lisinopril (PRINIVIL;ZESTRIL) 20 MG tablet TAKE 1 TABLET BY MOUTH ONE TIME A DAY 90 tablet 1    vitamin D (ERGOCALCIFEROL) 1.25 MG (01472 UT) CAPS capsule Take 1 capsule by mouth once a week for 12 doses 12 capsule 0    ibuprofen (ADVIL;MOTRIN) 800 MG tablet Take 1 tablet by mouth every 8 hours as needed for Pain 90 tablet 1    albuterol sulfate  (90 Base) MCG/ACT inhaler Inhale 2 puffs into the lungs 4 times daily as needed for Wheezing 1 Inhaler 0    nystatin (MYCOSTATIN) 492089 UNIT/GM powder APPLY EXTERNALLY THREE TIMES A DAY  60 g 0    furosemide (LASIX) 40 MG tablet Take 1 tablet by mouth 2 times daily as needed (edema) (Patient not taking: Reported on 2/16/2022) 180 tablet 1     Current Facility-Administered Medications   Medication Dose Route Frequency Provider Last Rate Last Admin    methylPREDNISolone acetate (DEPO-MEDROL) injection 80 mg  80 mg IntraMUSCular Once Katy Bowels, DO           Vital Signs:  BP (!) 141/70 (Site: Right Lower Arm, Position: Sitting, Cuff Size: Large Adult)   Pulse 79   Ht 5' 2\" (1.575 m)   Wt (!) 371 lb (168.3 kg)   BMI 67.86 kg/m²     BMI/Height/Weight:  Body mass index is 67.86 kg/m². Review of Systems - A review of systems was performed. All was negative unless otherwise documented in HPI. Constitutional: Negative for fever, chills and diaphoresis. HENT: Negative for hearing loss and trouble swallowing. Eyes: Negative for photophobia and visual disturbance.    Respiratory: Negative for cough, shortness of breath and wheezing. Cardiovascular: Negative for chest pain and palpitations. Gastrointestinal: Negative for nausea, vomiting, abdominal pain, diarrhea, constipation, blood in stool and abdominal distention. Endocrine: Negative for polydipsia, polyphagia and polyuria. Genitourinary: Negative for dysuria, frequency, hematuria and difficulty urinating. Musculoskeletal: Negative for myalgias, joint swelling. Skin: Negative for pallor and rash. Neurological: Negative for dizziness, tremors, light-headedness and headaches. Psychiatric/Behavioral: Negative for sleep disturbance and dysphoric mood. Objective:      Physical Exam   Vital signs reviewed. General: Well-developed and well-nourished. No acute distress. Skin: Warm, dry and intact. HEENT: Normocephalic. EOMs intact. Conjunctivae normal. Neck supple. Cardiovascular: Normal rate, regular rhythm. Pulmonary/Chest: Normal effort. Lungs clear to auscultation. No rales, rhonchi or wheezing. Abdominal: Positive bowel sounds. Soft, nontender. Nondistended. Musculoskeletal: Movement x4. Lymphedema bilaterally. Neurological: Gait normal. Alert and oriented to person, place, and time. Psychiatric: Normal mood and affect. Speech and behavior normal. Judgment and thought content normal. Cognition and memory intact. Assessment:       Diagnosis Orders   1. Essential hypertension, benign     2. Gastroesophageal reflux disease, unspecified whether esophagitis present     3. Morbid obesity ([address]     4. Mild intermittent asthma without complication     5. Arthritis     6. Lymphedema     7. Prediabetes     8. SUMA (obstructive sleep apnea)         Plan:    Dietitian visit today. Patient was encouraged to journal all food intake. Keep calorie level at approximately 4360-7009. Protein intake is to be a minimum of 60-80 grams per day. Water drinking was encouraged with a goal of 64oz-128oz daily.  Beverages to be calorie free except for milk. Every other beverage should be water. Avoid soda. Continue to increase level of physical activity. Encouraged use of exercise log. Sleep study report reviewed and discussed with patient. Long discussion re: The importance of getting CPAP and the negative health consequences of SUMA. Also explained the importance of CPAP use in the hospital while recovering from anesthesia after bariatric surgery. She verbalizes understanding. Follow-up  Return in about 1 month (around 3/16/2022). Orders this encounter:  No orders of the defined types were placed in this encounter. Prescriptions this encounter:  No orders of the defined types were placed in this encounter.       Electronically signed by:  Shakila Desouza CNP

## 2022-03-16 ENCOUNTER — OFFICE VISIT (OUTPATIENT)
Dept: BARIATRICS/WEIGHT MGMT | Age: 47
End: 2022-03-16
Payer: COMMERCIAL

## 2022-03-16 VITALS
DIASTOLIC BLOOD PRESSURE: 90 MMHG | BODY MASS INDEX: 53.92 KG/M2 | HEART RATE: 87 BPM | SYSTOLIC BLOOD PRESSURE: 140 MMHG | WEIGHT: 293 LBS | HEIGHT: 62 IN

## 2022-03-16 DIAGNOSIS — K21.9 GASTROESOPHAGEAL REFLUX DISEASE, UNSPECIFIED WHETHER ESOPHAGITIS PRESENT: ICD-10-CM

## 2022-03-16 DIAGNOSIS — J45.20 MILD INTERMITTENT ASTHMA WITHOUT COMPLICATION: ICD-10-CM

## 2022-03-16 DIAGNOSIS — I10 ESSENTIAL HYPERTENSION, BENIGN: Primary | ICD-10-CM

## 2022-03-16 DIAGNOSIS — R73.03 PREDIABETES: ICD-10-CM

## 2022-03-16 DIAGNOSIS — E66.01 MORBID OBESITY (HCC): ICD-10-CM

## 2022-03-16 DIAGNOSIS — I89.0 LYMPHEDEMA: ICD-10-CM

## 2022-03-16 DIAGNOSIS — E88.81 INSULIN RESISTANCE: ICD-10-CM

## 2022-03-16 DIAGNOSIS — M19.90 ARTHRITIS: ICD-10-CM

## 2022-03-16 PROCEDURE — 99213 OFFICE O/P EST LOW 20 MIN: CPT | Performed by: NURSE PRACTITIONER

## 2022-03-16 NOTE — PROGRESS NOTES
Medical Nutrition Therapy   Metabolic and Bariatric Surgery         Supervised diet and exercise preparation  Visit 4 out of 6  Pt reports:  She states she has done much better at eating from home, she has bought and prepared meals for breakfast and lunch but struggles with dinner. At this time, she states she probably eats out 4-5x/week instead of everyday for most meals. We discussed making more crockpot meals and I showed her the recipes in her book to increase eating from home. Changes in eating patterns to promote health are noted below on the goals number 19-22    Vitals: Wt Readings from Last 3 Encounters:   03/16/22 (!) 363 lb (164.7 kg)   02/16/22 (!) 371 lb (168.3 kg)   01/17/22 (!) 365 lb (165.6 kg)         Nutrition Assessment:   PES: Knowledge deficit related to healthy behaviors that support weight management post weight loss surgery as evidenced by Body mass index is 66.39 kg/m². Nutrition Assessment of Goal Attainment:  TREATMENT GOALS:    1. Pt  Completed 2 out of 3 goals. 2.TREATMENT GOALS FOR UPCOMING WEEK: continue all previous goals and add: # 16    All goals were planned with and agreed on by the patient. I want to improve my health because I want to be more active  appt # NA G What is your next step? C 1 2 3 4 5 6 7 8 9     0  1 I will read the education binder provided to me and the   x 100               2 I will make my pschological evaluation appoinment. x              4  3 I will bring this goal card to every appointment. 100 100 100 100           x 4 I will eliminate all tobacco/nicotine. x 5 I will limit alcoholic beverages to 1-5SK per week. 4  6 I will limit dining out to 3 times per week or less. 75           x 7 I will eliminate sugary beverages. x 8 I will eliminate carbonated beverages. x 9 I will eliminate drinking with a straw. 10 I will limit caffeinated beverages to 16oz daily. x              4  11 I will limit log my exercise daily. 100 100 100 100          2  12 I will determine my calcium and mvi plan. x   100           2  13 I will have 1-2 servings of lean protein present at each meal and minimeal. x  50 100           2  14 I will eat every 3-5 hours. x  50 100             15 I will drink 64oz of fluid daily. x              4  16 I will eat slowly during meals and snacks. 17 I will limit fluids 4oz before after and during meals. 18 I will eat protein first at all meals followed by vegetables,  Fruit and lastly whole grains. 23 My first weight neutral approach is:                 20 My second weight neutral approach is:                 21  My Thirds weight neutral approach is:                 22                  23                  24                    Questions asked for goal understanding:                                                  Do you understand your goals? Do you have the information you need to achieve your goals? Do you have any questions  right now? [x]  Consistent goal achievement in the program thus far and further success with goals is expected. []  Unable to consistently make progress in goal achievement. At this time patient is not moving forward  in developing the skills needed for success after surgery. Plan:    Continue to follow monthly and review goals.          [x]  Nutrition visits complete    []

## 2022-03-16 NOTE — PROGRESS NOTES
Medical Weight Management Progress Note    Subjective      Patient being seen for medically supervised weight loss for the chronic conditions of HTN, GERD, Asthma, Prediabetes, Arthritis, Lymphedema. She is working on the behavior changes discussed at the initial appointment. Patient continues on diet plan. Physical activity includes yoga ball. Weight loss of 8 lbs since last visit. Sleep study completed and dx SUMA. Titration visit scheduled 3/18/22. Psych eval completed and clearance received. EGD scheduled 3/25/22. No current issues. Working toward bariatric surgery:    [x] Sleeve Gastrectomy                                                           [] Abhay-en-Y Gastric Bypass    Allergies: Allergies   Allergen Reactions    Dicloxacillin     Metformin And Related Diarrhea       Past Medical History:     Past Medical History:   Diagnosis Date    Allergic rhinitis     Arthritis     Asthma     Caffeine use     1 coffee, 1-3 tea daily    Class 3 obesity with serious comorbidity and body mass index (BMI) of 60.0 to 69.9 in adult 1/18/2018    GERD (gastroesophageal reflux disease)     Heartburn     Hypertension     Insulin resistance 9/9/2014    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     Past Surgical History:  Past Surgical History:   Procedure Laterality Date    FOOT SURGERY      TONSILLECTOMY         Family History:  Family History   Problem Relation Age of Onset    High Blood Pressure Mother     Lung Cancer Mother     Cancer Mother     High Blood Pressure Father     Diabetes Father     COPD Father     High Blood Pressure Brother     Thyroid Cancer Brother     Cancer Brother     Heart Disease Maternal Grandmother     Depression Other     High Blood Pressure Other        Social History:  Social History     Socioeconomic History    Marital status: Single     Spouse name: Not on file    Number of children: Not on file    Years of education: Not on file    Highest education level: Not on file   Occupational History    Not on file   Tobacco Use    Smoking status: Never Smoker    Smokeless tobacco: Never Used   Substance and Sexual Activity    Alcohol use: No    Drug use: No    Sexual activity: Yes     Partners: Male   Other Topics Concern    Not on file   Social History Narrative    Not on file     Social Determinants of Health     Financial Resource Strain: Low Risk     Difficulty of Paying Living Expenses: Not hard at all   Food Insecurity: No Food Insecurity    Worried About Running Out of Food in the Last Year: Never true    [address] in the Last Year: Never true   Transportation Needs:     Lack of Transportation (Medical): Not on file    Lack of Transportation (Non-Medical):  Not on file   Physical Activity:     Days of Exercise per Week: Not on file    Minutes of Exercise per Session: Not on file   Stress:     Feeling of Stress : Not on file   Social Connections:     Frequency of Communication with Friends and Family: Not on file    Frequency of Social Gatherings with Friends and Family: Not on file    Attends [employer] Services: Not on file    Active Member of [address] [place] [address] or Organizations: Not on file    Attends Club or Organization Meetings: Not on file    Marital Status: Not on file   Intimate Partner Violence:     Fear of Current or Ex-Partner: Not on file    Emotionally Abused: Not on file    Physically Abused: Not on file    Sexually Abused: Not on file   Housing Stability:     Unable to Pay for Housing in the Last Year: Not on file    Number of [place] in the Last Year: Not on file    Unstable Housing in the Last Year: Not on file       Current Medications:  Current Outpatient Medications   Medication Sig Dispense Refill    cetirizine (ZYRTEC) 10 MG tablet TAKE 1 TABLET BY MOUTH ONE TIME A DAY 90 tablet 1    furosemide (LASIX) 40 MG tablet Take 1 tablet by mouth 2 times daily as needed (edema) 180 tablet 1    montelukast (SINGULAIR) 10 MG tablet Take 1 tablet by mouth daily 90 tablet 1    pioglitazone (ACTOS) 15 MG tablet TAKE 1 TABLET BY MOUTH ONE TIME A DAY 90 tablet 1    furosemide (LASIX) 20 MG tablet Take 1 tablet by mouth 2 times daily as needed (edema) 180 tablet 1    famotidine (PEPCID) 40 MG tablet Take 1 tablet by mouth 2 times daily 180 tablet 1    lisinopril (PRINIVIL;ZESTRIL) 20 MG tablet TAKE 1 TABLET BY MOUTH ONE TIME A DAY 90 tablet 1    vitamin D (ERGOCALCIFEROL) 1.25 MG (36627 UT) CAPS capsule Take 1 capsule by mouth once a week for 12 doses 12 capsule 0    ibuprofen (ADVIL;MOTRIN) 800 MG tablet Take 1 tablet by mouth every 8 hours as needed for Pain 90 tablet 1    albuterol sulfate  (90 Base) MCG/ACT inhaler Inhale 2 puffs into the lungs 4 times daily as needed for Wheezing 1 Inhaler 0    nystatin (MYCOSTATIN) 956841 UNIT/GM powder APPLY EXTERNALLY THREE TIMES A DAY  60 g 0     Current Facility-Administered Medications   Medication Dose Route Frequency Provider Last Rate Last Admin    methylPREDNISolone acetate (DEPO-MEDROL) injection 80 mg  80 mg IntraMUSCular Once Ramo Sidle, DO           Vital Signs:  BP (!) 140/90 (Site: Right Upper Arm, Position: Sitting, Cuff Size: Large Adult)   Pulse 87   Ht 5' 2\" (1.575 m)   Wt (!) 363 lb (164.7 kg)   BMI 66.39 kg/m²     BMI/Height/Weight:  Body mass index is 66.39 kg/m². Review of Systems - A review of systems was performed. All was negative unless otherwise documented in HPI. Constitutional: Negative for fever, chills and diaphoresis. HENT: Negative for hearing loss and trouble swallowing. Eyes: Negative for photophobia and visual disturbance. Respiratory: Negative for cough, shortness of breath and wheezing. Cardiovascular: Negative for chest pain and palpitations. Gastrointestinal: Negative for nausea, vomiting, abdominal pain, diarrhea, constipation, blood in stool and abdominal distention. Endocrine: Negative for polydipsia, polyphagia and polyuria. Genitourinary: Negative for dysuria, frequency, hematuria and difficulty urinating. Musculoskeletal: Negative for myalgias, joint swelling. Skin: Negative for pallor and rash. Neurological: Negative for dizziness, tremors, light-headedness and headaches. Psychiatric/Behavioral: Negative for sleep disturbance and dysphoric mood. Objective:      Physical Exam   Vital signs reviewed. General: Well-developed and well-nourished. No acute distress. Skin: Warm, dry and intact. HEENT: Normocephalic. EOMs intact. Conjunctivae normal. Neck supple. Cardiovascular: Normal rate, regular rhythm. Pulmonary/Chest: Normal effort. Lungs clear to auscultation. No rales, rhonchi or wheezing. Abdominal: Positive bowel sounds. Soft, nontender. Nondistended. Musculoskeletal: Movement x4. Lymphedema bilaterally. Neurological: Gait normal. Alert and oriented to person, place, and time. Psychiatric: Normal mood and affect. Speech and behavior normal. Judgment and thought content normal. Cognition and memory intact. Assessment:       Diagnosis Orders   1. Essential hypertension, benign     2. Gastroesophageal reflux disease, unspecified whether esophagitis present     3. Morbid obesity ([address]     4. Prediabetes     5. Lymphedema     6. Arthritis     7. Mild intermittent asthma without complication     8. Insulin resistance         Plan:    Dietitian visit today. Patient was encouraged to journal all food intake. Keep calorie level at approximately 4751-0073. Protein intake is to be a minimum of 60-80 grams per day. Water drinking was encouraged with a goal of 64oz-128oz daily. Beverages to be calorie free except for milk.  Every other beverage should be water. Avoid soda. Continue to increase level of physical activity. Encouraged use of exercise log. Follow-up  Return in about 1 month (around 4/16/2022). Orders this encounter:  No orders of the defined types were placed in this encounter. Prescriptions this encounter:  No orders of the defined types were placed in this encounter.       Electronically signed by:  Uday Castillo CNP

## 2022-03-18 ENCOUNTER — HOSPITAL ENCOUNTER (OUTPATIENT)
Dept: SLEEP CENTER | Age: 47
Discharge: HOME | End: 2022-03-20
Payer: COMMERCIAL

## 2022-03-18 DIAGNOSIS — G47.33 OBSTRUCTIVE SLEEP APNEA: ICD-10-CM

## 2022-03-18 PROCEDURE — 95811 POLYSOM 6/>YRS CPAP 4/> PARM: CPT

## 2022-03-19 VITALS
HEIGHT: 62 IN | WEIGHT: 293 LBS | HEART RATE: 75 BPM | RESPIRATION RATE: 20 BRPM | OXYGEN SATURATION: 95 % | BODY MASS INDEX: 53.92 KG/M2

## 2022-03-25 ENCOUNTER — HOSPITAL ENCOUNTER (OUTPATIENT)
Age: 47
Setting detail: OUTPATIENT SURGERY
Discharge: HOME | End: 2022-03-25
Attending: SURGERY | Admitting: SURGERY
Payer: COMMERCIAL

## 2022-03-25 ENCOUNTER — ANESTHESIA (OUTPATIENT)
Dept: ENDOSCOPY | Age: 47
End: 2022-03-25
Payer: COMMERCIAL

## 2022-03-25 ENCOUNTER — ANESTHESIA EVENT (OUTPATIENT)
Dept: ENDOSCOPY | Age: 47
End: 2022-03-25
Payer: COMMERCIAL

## 2022-03-25 VITALS
HEART RATE: 79 BPM | HEIGHT: 61 IN | SYSTOLIC BLOOD PRESSURE: 125 MMHG | TEMPERATURE: 98.6 F | DIASTOLIC BLOOD PRESSURE: 68 MMHG | OXYGEN SATURATION: 96 % | BODY MASS INDEX: 55.32 KG/M2 | WEIGHT: 293 LBS | RESPIRATION RATE: 24 BRPM

## 2022-03-25 VITALS
SYSTOLIC BLOOD PRESSURE: 110 MMHG | OXYGEN SATURATION: 99 % | DIASTOLIC BLOOD PRESSURE: 66 MMHG | RESPIRATION RATE: 27 BRPM

## 2022-03-25 PROCEDURE — 2709999900 HC NON-CHARGEABLE SUPPLY: Performed by: SURGERY

## 2022-03-25 PROCEDURE — 7100000011 HC PHASE II RECOVERY - ADDTL 15 MIN: Performed by: SURGERY

## 2022-03-25 PROCEDURE — 3700000001 HC ADD 15 MINUTES (ANESTHESIA): Performed by: SURGERY

## 2022-03-25 PROCEDURE — 2500000003 HC RX 250 WO HCPCS

## 2022-03-25 PROCEDURE — 88305 TISSUE EXAM BY PATHOLOGIST: CPT

## 2022-03-25 PROCEDURE — 2580000003 HC RX 258: Performed by: SURGERY

## 2022-03-25 PROCEDURE — 7100000010 HC PHASE II RECOVERY - FIRST 15 MIN: Performed by: SURGERY

## 2022-03-25 PROCEDURE — 6360000002 HC RX W HCPCS

## 2022-03-25 PROCEDURE — 3609012400 HC EGD TRANSORAL BIOPSY SINGLE/MULTIPLE: Performed by: SURGERY

## 2022-03-25 PROCEDURE — 43239 EGD BIOPSY SINGLE/MULTIPLE: CPT | Performed by: SURGERY

## 2022-03-25 PROCEDURE — 3700000000 HC ANESTHESIA ATTENDED CARE: Performed by: SURGERY

## 2022-03-25 PROCEDURE — 2580000003 HC RX 258

## 2022-03-25 RX ORDER — KETAMINE HCL IN NACL, ISO-OSM 100MG/10ML
SYRINGE (ML) INJECTION PRN
Status: DISCONTINUED | OUTPATIENT
Start: 2022-03-25 | End: 2022-03-25 | Stop reason: SDUPTHER

## 2022-03-25 RX ORDER — SODIUM CHLORIDE 9 MG/ML
INJECTION, SOLUTION INTRAVENOUS CONTINUOUS
Status: DISCONTINUED | OUTPATIENT
Start: 2022-03-25 | End: 2022-03-25 | Stop reason: HOSPADM

## 2022-03-25 RX ORDER — PROPOFOL 10 MG/ML
INJECTION, EMULSION INTRAVENOUS PRN
Status: DISCONTINUED | OUTPATIENT
Start: 2022-03-25 | End: 2022-03-25 | Stop reason: SDUPTHER

## 2022-03-25 RX ORDER — LIDOCAINE HYDROCHLORIDE 10 MG/ML
INJECTION, SOLUTION EPIDURAL; INFILTRATION; INTRACAUDAL; PERINEURAL PRN
Status: DISCONTINUED | OUTPATIENT
Start: 2022-03-25 | End: 2022-03-25 | Stop reason: SDUPTHER

## 2022-03-25 RX ORDER — SODIUM CHLORIDE, SODIUM LACTATE, POTASSIUM CHLORIDE, CALCIUM CHLORIDE 600; 310; 30; 20 MG/100ML; MG/100ML; MG/100ML; MG/100ML
INJECTION, SOLUTION INTRAVENOUS CONTINUOUS PRN
Status: DISCONTINUED | OUTPATIENT
Start: 2022-03-25 | End: 2022-03-25 | Stop reason: SDUPTHER

## 2022-03-25 RX ORDER — MIDAZOLAM HYDROCHLORIDE 1 MG/ML
INJECTION INTRAMUSCULAR; INTRAVENOUS PRN
Status: DISCONTINUED | OUTPATIENT
Start: 2022-03-25 | End: 2022-03-25 | Stop reason: SDUPTHER

## 2022-03-25 RX ADMIN — PHENYLEPHRINE HYDROCHLORIDE 200 MCG: 10 INJECTION INTRAVENOUS at 08:00

## 2022-03-25 RX ADMIN — LIDOCAINE HYDROCHLORIDE 50 MG: 10 INJECTION, SOLUTION EPIDURAL; INFILTRATION; INTRACAUDAL at 07:57

## 2022-03-25 RX ADMIN — SODIUM CHLORIDE, POTASSIUM CHLORIDE, SODIUM LACTATE AND CALCIUM CHLORIDE: 600; 310; 30; 20 INJECTION, SOLUTION INTRAVENOUS at 07:50

## 2022-03-25 RX ADMIN — Medication 20 MG: at 07:57

## 2022-03-25 RX ADMIN — PROPOFOL 30 MG: 10 INJECTION, EMULSION INTRAVENOUS at 08:02

## 2022-03-25 RX ADMIN — SODIUM CHLORIDE: 9 INJECTION, SOLUTION INTRAVENOUS at 07:21

## 2022-03-25 RX ADMIN — PROPOFOL 30 MG: 10 INJECTION, EMULSION INTRAVENOUS at 08:01

## 2022-03-25 RX ADMIN — PROPOFOL 30 MG: 10 INJECTION, EMULSION INTRAVENOUS at 08:04

## 2022-03-25 RX ADMIN — PROPOFOL 50 MG: 10 INJECTION, EMULSION INTRAVENOUS at 08:00

## 2022-03-25 RX ADMIN — MIDAZOLAM HYDROCHLORIDE 2 MG: 1 INJECTION, SOLUTION INTRAMUSCULAR; INTRAVENOUS at 07:56

## 2022-03-25 ASSESSMENT — PAIN - FUNCTIONAL ASSESSMENT: PAIN_FUNCTIONAL_ASSESSMENT: 0-10

## 2022-03-25 ASSESSMENT — PAIN SCALES - GENERAL
PAINLEVEL_OUTOF10: 0

## 2022-03-25 NOTE — ANESTHESIA PRE PROCEDURE
Related Diarrhea       Problem List:    Patient Active Problem List   Diagnosis Code    Essential hypertension, benign I10    Allergic rhinitis J30.9    Esophageal reflux K21.9    Insulin resistance E88.81    Neural foraminal stenosis of cervical spine M48.02    Morbid obesity ([address] E66.01    Stress incontinence N39.3    Urge incontinence of urine N39.41    Mild intermittent asthma without complication V16.73    Prediabetes R73.03    Lymphedema I89.0    Arthritis M19.90       Past Medical History:        Diagnosis Date    Allergic rhinitis     Arthritis     Asthma     Caffeine use     1 coffee, 1-3 tea daily    Class 3 obesity with serious comorbidity and body mass index (BMI) of 60.0 to 69.9 in adult 1/18/2018    GERD (gastroesophageal reflux disease)     Heartburn     Hypertension     Insulin resistance 9/9/2014    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                                            BP Readings from Last 3 Encounters:   03/16/22 (!) 140/90   02/16/22 (!) 141/70   01/17/22 120/70       NPO Status:                                                                                 BMI:   Wt Readings from Last 3 Encounters:   03/19/22 (!) 363 lb (164.7 kg)   03/16/22 (!) 363 lb (164.7 kg)   02/16/22 (!) 371 lb (168.3 kg)     There is no height or weight on file to calculate BMI.    CBC:   Lab Results   Component Value Date    WBC 7.4 12/30/2021    RBC 4.70 12/30/2021    HGB 13.1 12/30/2021    HCT 39.3 12/30/2021    MCV 83.7 12/30/2021    RDW 14.2 12/30/2021     12/30/2021       CMP:   Lab Results   Component Value Date     12/30/2021    K 4.4 12/30/2021     12/30/2021    CO2 26 12/30/2021    BUN 23 12/30/2021    CREATININE 0.90 12/30/2021    GFRAA >60 12/30/2021    LABGLOM >60 12/30/2021    GLUCOSE 115 12/30/2021    PROT 6.6 12/30/2021    CALCIUM 9.2 12/30/2021    BILITOT 0.30 12/30/2021    ALKPHOS 63 12/30/2021    AST 12 12/30/2021    ALT 15 12/30/2021       POC Tests: No results for input(s): POCGLU, POCNA, POCK, POCCL, POCBUN, POCHEMO, POCHCT in the last 72 hours. Coags: No results found for: PROTIME, INR, APTT    HCG (If Applicable): No results found for: PREGTESTUR, PREGSERUM, HCG, HCGQUANT     ABGs: No results found for: PHART, PO2ART, EPS4FZH, XXN8MNK, BEART, H3ENBMMN     Type & Screen (If Applicable):  No results found for: LABABO, LABRH    Drug/Infectious Status (If Applicable):  No results found for: HIV, HEPCAB    COVID-19 Screening (If Applicable): No results found for: COVID19        Anesthesia Evaluation  Patient summary reviewed no history of anesthetic complications:   Airway: Mallampati: II        Dental:          Pulmonary:   (+) decreased breath sounds,  asthma:                            Cardiovascular:    (+) hypertension:,         Rhythm: regular  Rate: normal                    Neuro/Psych:   (+) neuromuscular disease:,              ROS comment: Neural foraminal stenosis of cervical spine GI/Hepatic/Renal:   (+) GERD:, morbid obesity          Endo/Other:    (+) : arthritis:., .                 Abdominal:             Vascular: negative vascular ROS. Other Findings:             Anesthesia Plan      MAC     ASA 3             Anesthetic plan and risks discussed with patient. Plan discussed with CRNA.                   Suzanne Ocampo Nikolas Maria MD   3/25/2022

## 2022-03-25 NOTE — ANESTHESIA POSTPROCEDURE EVALUATION
Department of Anesthesiology  Postprocedure Note    Patient: Alexis Werner  MRN: [id number]  YOB: 1975  Date of evaluation: 3/25/2022  Time:  9:37 AM     Procedure Summary     Date: 03/25/22 Room / Location: [address]    Anesthesia Start: 5930 Anesthesia Stop: 5687    Procedure: EGD BIOPSY (N/A ) Diagnosis: (GERD, OBESITY)    Surgeons: Kaitlynn Prince DO Responsible Provider: lIia Cee MD    Anesthesia Type: MAC ASA Status: 3          Anesthesia Type: MAC    Nils Phase I: Nils Score: 10    Nils Phase II: Nils Score: 10    Last vitals: Reviewed and per EMR flowsheets.        Anesthesia Post Evaluation    Patient location during evaluation: PACU  Patient participation: complete - patient participated  Level of consciousness: awake  Pain score: 1  Airway patency: patent  Nausea & Vomiting: no nausea and no vomiting  Complications: no  Cardiovascular status: blood pressure returned to baseline and hemodynamically stable  Respiratory status: acceptable  Hydration status: euvolemic

## 2022-03-25 NOTE — OP NOTE
[facility] ENDOSCOPY  [address] [facility] [address]  Dept: [phone]  Loc: [phone]      Preoperative Diagnosis:  GERD, Morbid obesity, BMI of Body mass index is 68.59 kg/m². Postoperative Diagnosis: GERD without esophagitis, hiatal hernia, small sliding, Morbid obesity, BMI of Body mass index is 68.59 kg/m². Procedure: Esophagogastroduodenoscopy with Biopsy    Surgeon: Sheron Holcomb DO    Assistant:    Anesthesia: MAC, see anesthesia records    Specimen:    1) Antrum for H. Pylori    Findings:   1-2 cm sliding hiatal hernia    EBL: NONE    Operative Narrative: The risks and benefits were explained in detail to the patient who agreed and consented to the procedure. The patient was taken to the endoscopic suite and placed in a lateral position. Oxygen was administered via nasal cannula and a mouth guard was placed. MAC was administered via the anesthetic team.      The endoscope was then advanced into the oropharynx and down into the esophagus under direct visualization. The scope was further advanced through the esophagus, GE junction and stomach to the pylorus under visualization. The scope was passed through the pylorus and duodenal sweep performed, advancing and visualizing to the second portion of the duodenum. The scope was then withdrawn to the antrum and cold forceps were used to take biopsies of the antrum for H. Pylori. Appropriate hemostasis was noted. The scope was then retroflexed to visualize the GE junction. Evidence of a hiatal hernia was noted. The scope was then slowly withdrawn through the GE junction. The Z line was noted. The stomach was then decompressed. The scope was withdrawn from the esophagus and no further lesions noted. The scope was withdrawn. The patient tolerated the procedure well. PPI. She will follow up with the [facility] for further assessment.

## 2022-03-25 NOTE — H&P
Subjective     The patient is a 48 y.o. female who is here to undergo EGD for GERD. Body mass index is 68.59 kg/m². They are considering a bariatric procedure for morbid obesity. 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 Review of Systems - A complete 14 point review of systems was performed. All was negative unless otherwise documented in HPI. Allergies:   Allergies   Allergen Reactions    Dicloxacillin     Metformin And Related Diarrhea       Past Surgical History:  Past Surgical History:   Procedure Laterality Date    FOOT SURGERY      TONSILLECTOMY         Family History:  Family History   Problem Relation Age of Onset    High Blood Pressure Mother     Lung Cancer Mother     Cancer Mother     High Blood Pressure Father     Diabetes Father     COPD Father     High Blood Pressure Brother     Thyroid Cancer Brother     Cancer Brother     Heart Disease Maternal Grandmother     Depression Other     High Blood Pressure Other        Social History:  Social History     Socioeconomic History    Marital status: Single     Spouse name: Not on file    Number of children: Not on file    Years of education: Not on file    Highest education level: Not on file   Occupational History    Not on file   Tobacco Use    Smoking status: Never Smoker    Smokeless tobacco: Never Used   Substance and Sexual Activity    Alcohol use: No    Drug use: No    Sexual activity: Yes     Partners: Male   Other Topics Concern    Not on file   Social History Narrative    Not on file     Social Determinants of Health     Financial Resource Strain: Low Risk     Difficulty of Paying Living Expenses: Not hard at all   Food Insecurity: No Food Insecurity    Worried About Running Out of Food in the Last Year: Never true    [address] in the Last Year: Never true   Transportation Needs:     Lack of Transportation (Medical): Not on file    Lack of Transportation (Non-Medical):  Not on file   Physical Activity:     Days of Exercise per Week: Not on file    Minutes of Exercise per Session: Not on file   Stress:     Feeling of Stress : Not on file   Social Connections:     Frequency of Communication with Friends and Family: Not on file    Frequency of Social Gatherings with Friends and Family: Not on file    Attends Quaker Services: Not on file    Active Member of [address] [place] [address] or Organizations: Not on file    Attends Club or Organization Meetings: Not on file    Marital Status: Not on file   Intimate Partner Violence:     Fear of Current or Ex-Partner: Not on file    Emotionally Abused: Not on file    Physically Abused: Not on file    Sexually Abused: Not on file   Housing Stability:     Unable to Pay for Housing in the Last Year: Not on file    Number of [place] in the Last Year: Not on file    Unstable Housing in the Last Year: Not on file       Current Facility-Administered Medications   Medication Dose Route Frequency Provider Last Rate Last Admin    0.9 % sodium chloride infusion   IntraVENous Continuous Kaitlynn Roof,  mL/hr at 03/25/22 0721 New Bag at 03/25/22 0721       Objective      Physical Exam  /69   Pulse 97   Temp 98.4 °F (36.9 °C) (Infrared)   Resp 23   Ht 5' 1\" (1.549 m)   Wt (!) 363 lb (164.7 kg)   SpO2 95%   BMI 68.59 kg/m²    Constitutional:  Vital signs are normal. The patient appears well-developed and well-nourished. HEENT:   Head: Normocephalic. Atraumatic  Eyes: pupils are equal and reactive. No scleral icterus is present. Neck: No mass and no thyromegaly present. Cardiovascular: Normal rate, regular rhythm, S1 normal and S2 normal.    Pulmonary/Chest: Effort normal and breath sounds normal.   Abdominal: Soft. Normal appearance. There is no organomegaly. No tenderness. There is no rigidity, no rebound, no guarding and no Vela's sign. Musculoskeletal:        Right lower leg: Normal. No tenderness and no edema. Left lower leg: Normal. No tenderness and no edema. Lymphadenopathy:     No cervical adenopathy, No Exrtemity Adenopathy. Neurological: The patient is alert and oriented. Skin: Skin is warm, dry and intact. Psychiatric: The patient has a normal mood and affect.  Speech is normal and behavior is normal. Judgment and thought content normal. Cognition and memory are normal.     Assessment     Patient Active Problem List   Diagnosis    Essential hypertension, benign    Allergic rhinitis    Esophageal reflux    Insulin resistance    Neural foraminal stenosis of cervical spine    Morbid obesity ([address]    Stress incontinence    Urge incontinence of urine    Mild intermittent asthma without complication    Prediabetes    Lymphedema    Arthritis       Plan   EGD

## 2022-03-28 LAB — SURGICAL PATHOLOGY REPORT: NORMAL

## 2022-04-05 LAB — STATUS: NORMAL

## 2022-04-13 ENCOUNTER — OFFICE VISIT (OUTPATIENT)
Dept: BARIATRICS/WEIGHT MGMT | Age: 47
End: 2022-04-13
Payer: COMMERCIAL

## 2022-04-13 VITALS
DIASTOLIC BLOOD PRESSURE: 77 MMHG | BODY MASS INDEX: 53.92 KG/M2 | HEIGHT: 62 IN | RESPIRATION RATE: 20 BRPM | HEART RATE: 89 BPM | SYSTOLIC BLOOD PRESSURE: 125 MMHG | WEIGHT: 293 LBS

## 2022-04-13 DIAGNOSIS — R73.03 PREDIABETES: ICD-10-CM

## 2022-04-13 DIAGNOSIS — J45.20 MILD INTERMITTENT ASTHMA WITHOUT COMPLICATION: ICD-10-CM

## 2022-04-13 DIAGNOSIS — K44.9 HIATAL HERNIA WITH GERD: ICD-10-CM

## 2022-04-13 DIAGNOSIS — I89.0 LYMPHEDEMA: ICD-10-CM

## 2022-04-13 DIAGNOSIS — I10 ESSENTIAL HYPERTENSION, BENIGN: Primary | ICD-10-CM

## 2022-04-13 DIAGNOSIS — K21.9 HIATAL HERNIA WITH GERD: ICD-10-CM

## 2022-04-13 DIAGNOSIS — E66.01 MORBID OBESITY (HCC): ICD-10-CM

## 2022-04-13 DIAGNOSIS — M19.90 ARTHRITIS: ICD-10-CM

## 2022-04-13 PROCEDURE — 99213 OFFICE O/P EST LOW 20 MIN: CPT | Performed by: NURSE PRACTITIONER

## 2022-04-13 NOTE — PROGRESS NOTES
Medical Weight Management Progress Note    Subjective      Patient being seen for medically supervised weight loss for the chronic conditions of HTN, GERD, Asthma, Prediabetes, Arthritis, Lymphedema. She is working on the behavior changes discussed at the initial appointment. Patient continues on diet plan. Physical activity includes yoga ball. Weight gain of 5 lbs since last visit. Sleep study completed and dx SUMA. Waiting for CPAP. Psych eval completed and clearance received. EGD completed and revealed hiatal hernia. H Pylori negative. No current issues. Working toward bariatric surgery:    [x] Sleeve Gastrectomy                                                           [] Abhay-en-Y Gastric Bypass    Allergies: Allergies   Allergen Reactions    Dicloxacillin     Metformin And Related Diarrhea       Past Medical History:     Past Medical History:   Diagnosis Date    Allergic rhinitis     Arthritis     Asthma     Caffeine use     1 coffee, 1-3 tea daily    Class 3 obesity with serious comorbidity and body mass index (BMI) of 60.0 to 69.9 in adult 1/18/2018    GERD (gastroesophageal reflux disease)     Heartburn     Hypertension     Insulin resistance 9/9/2014    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     Past Surgical History:  Past Surgical History:   Procedure Laterality Date    FOOT SURGERY      TONSILLECTOMY      UPPER GASTROINTESTINAL ENDOSCOPY N/A 3/25/2022    EGD BIOPSY performed by Reji Mccoy DO at [facility]       Family History:  Family History   Problem Relation Age of Onset    High Blood Pressure Mother     Lung Cancer Mother     Cancer Mother     High Blood Pressure Father     Diabetes Father     COPD Father     High Blood Pressure Brother     Thyroid Cancer Brother     Cancer Brother     Heart Disease Maternal Grandmother     Depression Other     High Blood Pressure Other        Social History:  Social History     Socioeconomic History    Marital status: Single     Spouse name: Not on file    Number of children: Not on file    Years of education: Not on file    Highest education level: Not on file   Occupational History    Not on file   Tobacco Use    Smoking status: Never Smoker    Smokeless tobacco: Never Used   Substance and Sexual Activity    Alcohol use: No    Drug use: No    Sexual activity: Yes     Partners: Male   Other Topics Concern    Not on file   Social History Narrative    Not on file     Social Determinants of Health     Financial Resource Strain: Low Risk     Difficulty of Paying Living Expenses: Not hard at all   Food Insecurity: No Food Insecurity    Worried About Running Out of Food in the Last Year: Never true    [address] in the Last Year: Never true   Transportation Needs:     Lack of Transportation (Medical): Not on file    Lack of Transportation (Non-Medical):  Not on file   Physical Activity:     Days of Exercise per Week: Not on file    Minutes of Exercise per Session: Not on file   Stress:     Feeling of Stress : Not on file   Social Connections:     Frequency of Communication with Friends and Family: Not on file    Frequency of Social Gatherings with Friends and Family: Not on file    Attends Yarsani Services: Not on file    Active Member of Clubs or Organizations: Not on file    Attends Club or Organization Meetings: Not on file    Marital Status: Not on file   Intimate Partner Violence:     Fear of Current or Ex-Partner: Not on file    Emotionally Abused: Not on file    Physically Abused: Not on file    Sexually Abused: Not on file   Housing Stability:     Unable to Pay for Housing in the Last Year: Not on file    Number of [place] in the Last Year: Not on file    Unstable Housing in the Last Year: Not on file       Current Medications:  Current Outpatient Medications   Medication Sig Dispense Refill    cetirizine (ZYRTEC) 10 MG tablet TAKE 1 TABLET BY MOUTH ONE TIME A DAY 90 tablet 1    furosemide (LASIX) 40 MG tablet Take 1 tablet by mouth 2 times daily as needed (edema) 180 tablet 1    montelukast (SINGULAIR) 10 MG tablet Take 1 tablet by mouth daily 90 tablet 1    pioglitazone (ACTOS) 15 MG tablet TAKE 1 TABLET BY MOUTH ONE TIME A DAY 90 tablet 1    furosemide (LASIX) 20 MG tablet Take 1 tablet by mouth 2 times daily as needed (edema) 180 tablet 1    famotidine (PEPCID) 40 MG tablet Take 1 tablet by mouth 2 times daily 180 tablet 1    lisinopril (PRINIVIL;ZESTRIL) 20 MG tablet TAKE 1 TABLET BY MOUTH ONE TIME A DAY 90 tablet 1    ibuprofen (ADVIL;MOTRIN) 800 MG tablet Take 1 tablet by mouth every 8 hours as needed for Pain 90 tablet 1    albuterol sulfate  (90 Base) MCG/ACT inhaler Inhale 2 puffs into the lungs 4 times daily as needed for Wheezing 1 Inhaler 0    nystatin (MYCOSTATIN) 698859 UNIT/GM powder APPLY EXTERNALLY THREE TIMES A DAY  60 g 0     Current Facility-Administered Medications   Medication Dose Route Frequency Provider Last Rate Last Admin    methylPREDNISolone acetate (DEPO-MEDROL) injection 80 mg  80 mg IntraMUSCular Once Caden Karla, DO           Vital Signs:  /77 (Site: Right Lower Arm, Position: Sitting, Cuff Size: Large Adult)   Pulse 89   Resp 20   Ht 5' 2\" (1.575 m)   Wt (!) 368 lb (166.9 kg)   BMI 67.31 kg/m²     BMI/Height/Weight:  Body mass index is 67.31 kg/m². Review of Systems - A review of systems was performed. All was negative unless otherwise documented in HPI. Constitutional: Negative for fever, chills and diaphoresis. HENT: Negative for hearing loss and trouble swallowing. Eyes: Negative for photophobia and visual disturbance.    Respiratory: Negative for cough, shortness of breath and wheezing. Cardiovascular: Negative for chest pain and palpitations. Gastrointestinal: Negative for nausea, vomiting, abdominal pain, diarrhea, constipation, blood in stool and abdominal distention. Endocrine: Negative for polydipsia, polyphagia and polyuria. Genitourinary: Negative for dysuria, frequency, hematuria and difficulty urinating. Musculoskeletal: Negative for myalgias, joint swelling. Skin: Negative for pallor and rash. Neurological: Negative for dizziness, tremors, light-headedness and headaches. Psychiatric/Behavioral: Negative for sleep disturbance and dysphoric mood. Objective:      Physical Exam   Vital signs reviewed. General: Well-developed and well-nourished. No acute distress. Skin: Warm, dry and intact. HEENT: Normocephalic. EOMs intact. Conjunctivae normal. Neck supple. Cardiovascular: Normal rate, regular rhythm. Pulmonary/Chest: Normal effort. Lungs clear to auscultation. No rales, rhonchi or wheezing. Abdominal: Positive bowel sounds. Soft, nontender. Nondistended. Musculoskeletal: Movement x4. Lymphedema bilaterally. Neurological: Gait normal. Alert and oriented to person, place, and time. Psychiatric: Normal mood and affect. Speech and behavior normal. Judgment and thought content normal. Cognition and memory intact. Assessment:       Diagnosis Orders   1. Essential hypertension, benign     2. Hiatal hernia with GERD     3. Morbid obesity ([address]     4. Prediabetes     5. Lymphedema     6. Arthritis     7. Mild intermittent asthma without complication         Plan:    Dietitian visit today. Patient was encouraged to journal all food intake. Keep calorie level at approximately 0982-2267. Protein intake is to be a minimum of 60-80 grams per day. Water drinking was encouraged with a goal of 64oz-128oz daily. Beverages to be calorie free except for milk. Every other beverage should be water. Avoid soda.    Continue to increase level of physical activity. Encouraged use of exercise log. EGD report reviewed with patient. Follow-up  Return in about 1 month (around 5/13/2022). Orders this encounter:  No orders of the defined types were placed in this encounter. Prescriptions this encounter:  No orders of the defined types were placed in this encounter.       Electronically signed by:  Pasquale Jean Baptiste CNP

## 2022-04-13 NOTE — PROGRESS NOTES
Medical Nutrition Therapy   Metabolic and Bariatric Surgery         Supervised diet and exercise preparation  Visit 5 out of 6  Pt reports:  She states she has cut down eating out to 2 times per week. She also states she has done a great job at slowing down during meals. Changes in eating patterns to promote health are noted below on the goals number 19-22    Vitals: Wt Readings from Last 3 Encounters:   04/13/22 (!) 368 lb (166.9 kg)   03/25/22 (!) 363 lb (164.7 kg)   03/19/22 (!) 363 lb (164.7 kg)         Nutrition Assessment:   PES: Knowledge deficit related to healthy behaviors that support weight management post weight loss surgery as evidenced by Body mass index is 67.31 kg/m². Nutrition Assessment of Goal Attainment:  TREATMENT GOALS:    1. Pt  Completed 4 out of 4 goals. 2.TREATMENT GOALS FOR UPCOMING WEEK: continue all previous goals and add: # 17, 18    All goals were planned with and agreed on by the patient. I want to improve my health because I want to be more active  appt # NA G What is your next step? C 1 2 3 4 5 6 7 8 9     0  1 I will read the education binder provided to me and the   x 100               2 I will make my pschological evaluation appoinment. x              5  3 I will bring this goal card to every appointment. 100 100 100 100 100          x 4 I will eliminate all tobacco/nicotine. x 5 I will limit alcoholic beverages to 6-8UX per week. 5  6 I will limit dining out to 3 times per week or less. 75 100          x 7 I will eliminate sugary beverages. x 8 I will eliminate carbonated beverages. x 9 I will eliminate drinking with a straw. 10 I will limit caffeinated beverages to 16oz daily. x              5  11 I will limit log my exercise daily. 100 100 100 100 100         2  12 I will determine my calcium and mvi plan.  x   100           2  13 I will have 1-2 servings of lean protein present at each meal and minimeal. x  50 100           2  14 I will eat every 3-5 hours. x  50 100             15 I will drink 64oz of fluid daily. x              4  16 I will eat slowly during meals and snacks. x     100         5  17 I will limit fluids 4oz before after and during meals. 5  18 I will eat protein first at all meals followed by vegetables,  Fruit and lastly whole grains. 23 My first weight neutral approach is:                 20 My second weight neutral approach is:                 21  My Thirds weight neutral approach is:                 22                  23                  24                    Questions asked for goal understanding:                                                  Do you understand your goals? Do you have the information you need to achieve your goals? Do you have any questions  right now? [x]  Consistent goal achievement in the program thus far and further success with goals is expected. []  Unable to consistently make progress in goal achievement. At this time patient is not moving forward  in developing the skills needed for success after surgery. Plan:    Continue to follow monthly and review goals.          [x]  Nutrition visits complete    []

## 2022-04-30 ENCOUNTER — HOSPITAL ENCOUNTER (EMERGENCY)
Facility: CLINIC | Age: 47
Discharge: HOME | End: 2022-04-30
Attending: EMERGENCY MEDICINE
Payer: COMMERCIAL

## 2022-04-30 VITALS
SYSTOLIC BLOOD PRESSURE: 151 MMHG | RESPIRATION RATE: 20 BRPM | HEART RATE: 99 BPM | OXYGEN SATURATION: 96 % | BODY MASS INDEX: 55.32 KG/M2 | WEIGHT: 293 LBS | TEMPERATURE: 98 F | DIASTOLIC BLOOD PRESSURE: 94 MMHG | HEIGHT: 61 IN

## 2022-04-30 DIAGNOSIS — E73.9 LACTOSE INTOLERANCE: Primary | ICD-10-CM

## 2022-04-30 DIAGNOSIS — K21.9 GASTROESOPHAGEAL REFLUX DISEASE WITHOUT ESOPHAGITIS: ICD-10-CM

## 2022-04-30 DIAGNOSIS — R10.13 ABDOMINAL PAIN, EPIGASTRIC: ICD-10-CM

## 2022-04-30 PROCEDURE — 6360000002 HC RX W HCPCS: Performed by: EMERGENCY MEDICINE

## 2022-04-30 PROCEDURE — 6370000000 HC RX 637 (ALT 250 FOR IP): Performed by: EMERGENCY MEDICINE

## 2022-04-30 PROCEDURE — 99284 EMERGENCY DEPT VISIT MOD MDM: CPT

## 2022-04-30 PROCEDURE — 96372 THER/PROPH/DIAG INJ SC/IM: CPT

## 2022-04-30 RX ORDER — MAGNESIUM HYDROXIDE/ALUMINUM HYDROXICE/SIMETHICONE 120; 1200; 1200 MG/30ML; MG/30ML; MG/30ML
30 SUSPENSION ORAL
Status: COMPLETED | OUTPATIENT
Start: 2022-04-30 | End: 2022-04-30

## 2022-04-30 RX ORDER — LIDOCAINE HYDROCHLORIDE 20 MG/ML
15 SOLUTION OROPHARYNGEAL ONCE
Status: COMPLETED | OUTPATIENT
Start: 2022-04-30 | End: 2022-04-30

## 2022-04-30 RX ORDER — FAMOTIDINE 20 MG/1
40 TABLET, FILM COATED ORAL ONCE
Status: COMPLETED | OUTPATIENT
Start: 2022-04-30 | End: 2022-04-30

## 2022-04-30 RX ORDER — KETOROLAC TROMETHAMINE 30 MG/ML
30 INJECTION, SOLUTION INTRAMUSCULAR; INTRAVENOUS ONCE
Status: COMPLETED | OUTPATIENT
Start: 2022-04-30 | End: 2022-04-30

## 2022-04-30 RX ADMIN — LIDOCAINE HYDROCHLORIDE 15 ML: 20 SOLUTION ORAL at 22:23

## 2022-04-30 RX ADMIN — FAMOTIDINE 40 MG: 20 TABLET ORAL at 22:23

## 2022-04-30 RX ADMIN — KETOROLAC TROMETHAMINE 30 MG: 30 INJECTION, SOLUTION INTRAMUSCULAR at 22:23

## 2022-04-30 RX ADMIN — ALUMINA, MAGNESIA, AND SIMETHICONE ORAL SUSPENSION REGULAR STRENGTH 30 ML: 1200; 1200; 120 SUSPENSION ORAL at 22:23

## 2022-04-30 ASSESSMENT — PAIN SCALES - GENERAL: PAINLEVEL_OUTOF10: 6

## 2022-04-30 ASSESSMENT — PAIN - FUNCTIONAL ASSESSMENT: PAIN_FUNCTIONAL_ASSESSMENT: 0-10

## 2022-05-01 ASSESSMENT — ENCOUNTER SYMPTOMS
SORE THROAT: 0
EYE PAIN: 0
COUGH: 0
DIARRHEA: 0
BACK PAIN: 0
RHINORRHEA: 0
NAUSEA: 0
VOMITING: 0
ABDOMINAL PAIN: 1
SHORTNESS OF BREATH: 0

## 2022-05-18 ENCOUNTER — OFFICE VISIT (OUTPATIENT)
Dept: BARIATRICS/WEIGHT MGMT | Age: 47
End: 2022-05-18
Payer: COMMERCIAL

## 2022-05-18 ENCOUNTER — TELEPHONE (OUTPATIENT)
Dept: BARIATRICS/WEIGHT MGMT | Age: 47
End: 2022-05-18

## 2022-05-18 VITALS
WEIGHT: 293 LBS | DIASTOLIC BLOOD PRESSURE: 82 MMHG | BODY MASS INDEX: 53.92 KG/M2 | HEART RATE: 86 BPM | HEIGHT: 62 IN | SYSTOLIC BLOOD PRESSURE: 138 MMHG

## 2022-05-18 DIAGNOSIS — I10 ESSENTIAL HYPERTENSION, BENIGN: Primary | ICD-10-CM

## 2022-05-18 DIAGNOSIS — K44.9 HIATAL HERNIA WITH GERD: ICD-10-CM

## 2022-05-18 DIAGNOSIS — R73.03 PREDIABETES: ICD-10-CM

## 2022-05-18 DIAGNOSIS — E66.01 MORBID OBESITY (HCC): ICD-10-CM

## 2022-05-18 DIAGNOSIS — M19.90 ARTHRITIS: ICD-10-CM

## 2022-05-18 DIAGNOSIS — E55.9 VITAMIN D DEFICIENCY: ICD-10-CM

## 2022-05-18 DIAGNOSIS — J45.20 MILD INTERMITTENT ASTHMA WITHOUT COMPLICATION: ICD-10-CM

## 2022-05-18 DIAGNOSIS — K21.9 HIATAL HERNIA WITH GERD: ICD-10-CM

## 2022-05-18 DIAGNOSIS — I89.0 LYMPHEDEMA: ICD-10-CM

## 2022-05-18 DIAGNOSIS — E88.81 INSULIN RESISTANCE: ICD-10-CM

## 2022-05-18 PROCEDURE — 99213 OFFICE O/P EST LOW 20 MIN: CPT | Performed by: NURSE PRACTITIONER

## 2022-05-18 NOTE — PROGRESS NOTES
Medical Weight Management Progress Note    Subjective      Patient being seen for medically supervised weight loss for the chronic conditions of HTN, GERD, Asthma, Prediabetes, Arthritis, Lymphedema. She is working on the behavior changes discussed at the initial appointment. Patient continues on diet plan. Physical activity includes yoga ball. Weight loss of 7 lbs since last visit. Sleep study completed and dx SUMA. Waiting for CPAP. Psych eval completed and clearance received. EGD completed and revealed hiatal hernia. H Pylori negative. No current issues. Working toward bariatric surgery:    [x] Sleeve Gastrectomy                                                           [] Abhay-en-Y Gastric Bypass    Allergies: Allergies   Allergen Reactions    Dicloxacillin     Metformin And Related Diarrhea       Past Medical History:     Past Medical History:   Diagnosis Date    Allergic rhinitis     Arthritis     Asthma     Caffeine use     1 coffee, 1-3 tea daily    Class 3 obesity with serious comorbidity and body mass index (BMI) of 60.0 to 69.9 in adult 1/18/2018    GERD (gastroesophageal reflux disease)     Heartburn     Hypertension     Insulin resistance 9/9/2014    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     Past Surgical History:  Past Surgical History:   Procedure Laterality Date    FOOT SURGERY      TONSILLECTOMY      UPPER GASTROINTESTINAL ENDOSCOPY N/A 3/25/2022    [facility]       Family History:  Family History   Problem Relation Age of Onset    High Blood Pressure Mother     Lung Cancer Mother     Cancer Mother     High Blood Pressure Father     Diabetes Father     COPD Father     High Blood Pressure Brother     Thyroid Cancer Brother     Cancer Brother     Heart Disease Maternal Grandmother     Depression Other     High Blood Pressure Other        Social History:  Social History     Socioeconomic History    Marital status: Single     Spouse name: Not on file    Number of children: Not on file    Years of education: Not on file    Highest education level: Not on file   Occupational History    Not on file   Tobacco Use    Smoking status: Never Smoker    Smokeless tobacco: Never Used   Substance and Sexual Activity    Alcohol use: No    Drug use: No    Sexual activity: Yes     Partners: Male   Other Topics Concern    Not on file   Social History Narrative    Not on file     Social Determinants of Health     Financial Resource Strain: Low Risk     Difficulty of Paying Living Expenses: Not hard at all   Food Insecurity: No Food Insecurity    Worried About Running Out of Food in the Last Year: Never true    [address] in the Last Year: Never true   Transportation Needs:     Lack of Transportation (Medical): Not on file    Lack of Transportation (Non-Medical):  Not on file   Physical Activity:     Days of Exercise per Week: Not on file    Minutes of Exercise per Session: Not on file   Stress:     Feeling of Stress : Not on file   Social Connections:     Frequency of Communication with Friends and Family: Not on file    Frequency of Social Gatherings with Friends and Family: Not on file    Attends [employer] Services: Not on file    Active Member of Clubs or Organizations: Not on file    Attends Club or Organization Meetings: Not on file    Marital Status: Not on file   Intimate Partner Violence:     Fear of Current or Ex-Partner: Not on file    Emotionally Abused: Not on file    Physically Abused: Not on file    Sexually Abused: Not on file   Housing Stability:     Unable to Pay for Housing in the Last Year: Not on file    Number of [place] in the Last Year: Not on file    Unstable Housing in the Last Year: Not on file       Current Medications:  Current Outpatient Medications   Medication Sig Dispense Refill    cetirizine (ZYRTEC) 10 MG tablet TAKE 1 TABLET BY MOUTH ONE TIME A DAY 90 tablet 1    furosemide (LASIX) 40 MG tablet Take 1 tablet by mouth 2 times daily as needed (edema) 180 tablet 1    montelukast (SINGULAIR) 10 MG tablet Take 1 tablet by mouth daily 90 tablet 1    pioglitazone (ACTOS) 15 MG tablet TAKE 1 TABLET BY MOUTH ONE TIME A DAY 90 tablet 1    furosemide (LASIX) 20 MG tablet Take 1 tablet by mouth 2 times daily as needed (edema) 180 tablet 1    famotidine (PEPCID) 40 MG tablet Take 1 tablet by mouth 2 times daily 180 tablet 1    lisinopril (PRINIVIL;ZESTRIL) 20 MG tablet TAKE 1 TABLET BY MOUTH ONE TIME A DAY 90 tablet 1    ibuprofen (ADVIL;MOTRIN) 800 MG tablet Take 1 tablet by mouth every 8 hours as needed for Pain 90 tablet 1    albuterol sulfate  (90 Base) MCG/ACT inhaler Inhale 2 puffs into the lungs 4 times daily as needed for Wheezing 1 Inhaler 0    nystatin (MYCOSTATIN) 095502 UNIT/GM powder APPLY EXTERNALLY THREE TIMES A DAY  60 g 0     Current Facility-Administered Medications   Medication Dose Route Frequency Provider Last Rate Last Admin    methylPREDNISolone acetate (DEPO-MEDROL) injection 80 mg  80 mg IntraMUSCular Once Kelly Rout, DO           Vital Signs:  /82 (Site: Right Upper Arm, Position: Sitting, Cuff Size: Large Adult)   Pulse 86   Ht 5' 2\" (1.575 m)   Wt (!) 361 lb (163.7 kg)   BMI 66.03 kg/m²     BMI/Height/Weight:  Body mass index is 66.03 kg/m². Review of Systems - A review of systems was performed. All was negative unless otherwise documented in HPI. Constitutional: Negative for fever, chills and diaphoresis. HENT: Negative for hearing loss and trouble swallowing. Eyes: Negative for photophobia and visual disturbance.    Respiratory: Negative for cough, shortness of breath and wheezing. Cardiovascular: Negative for chest pain and palpitations. Gastrointestinal: Negative for nausea, vomiting, abdominal pain, diarrhea, constipation, blood in stool and abdominal distention. Endocrine: Negative for polydipsia, polyphagia and polyuria. Genitourinary: Negative for dysuria, frequency, hematuria and difficulty urinating. Musculoskeletal: Negative for myalgias, joint swelling. Skin: Negative for pallor and rash. Neurological: Negative for dizziness, tremors, light-headedness and headaches. Psychiatric/Behavioral: Negative for sleep disturbance and dysphoric mood. Objective:      Physical Exam   Vital signs reviewed. General: Well-developed and well-nourished. No acute distress. Skin: Warm, dry and intact. HEENT: Normocephalic. EOMs intact. Conjunctivae normal. Neck supple. Cardiovascular: Normal rate, regular rhythm. Pulmonary/Chest: Normal effort. Lungs clear to auscultation. No rales, rhonchi or wheezing. Abdominal: Positive bowel sounds. Soft, nontender. Nondistended. Musculoskeletal: Movement x4. Lymphedema bilaterally. Neurological: Gait normal. Alert and oriented to person, place, and time. Psychiatric: Normal mood and affect. Speech and behavior normal. Judgment and thought content normal. Cognition and memory intact. Assessment:       Diagnosis Orders   1. Essential hypertension, benign  Nicotine, Blood   2. Hiatal hernia with GERD  Nicotine, Blood   3. Morbid obesity (HCC)  Nicotine, Blood   4. Mild intermittent asthma without complication  Nicotine, Blood   5. Prediabetes  Nicotine, Blood   6. Lymphedema  Nicotine, Blood   7. Arthritis  Nicotine, Blood   8. Insulin resistance  Nicotine, Blood   9. Vitamin D deficiency  Vitamin D 25 Hydroxy       Plan:    Dietitian visit today. Patient was encouraged to journal all food intake. Keep calorie level at approximately 7342-6365. Protein intake is to be a minimum of 60-80 grams per day.  Water drinking was encouraged with a goal of 64oz-128oz daily. Beverages to be calorie free except for milk. Every other beverage should be water. Avoid soda. Continue to increase level of physical activity. Encouraged use of exercise log. Nicotine level ordered per surgeon protocol. Recheck Vitamin D level ordered. Follow-up  No follow-ups on file. Orders this encounter:  Orders Placed This Encounter   Procedures    Nicotine, Blood     Standing Status:   Future     Standing Expiration Date:   5/18/2023    Vitamin D 25 Hydroxy     Standing Status:   Future     Standing Expiration Date:   5/18/2023       Prescriptions this encounter:  No orders of the defined types were placed in this encounter.       Electronically signed by:  Gaye Izaguirre CNP

## 2022-05-18 NOTE — PROGRESS NOTES
Medical Nutrition Therapy   Metabolic and Bariatric Surgery         Supervised diet and exercise preparation  Visit 6 out of 6  Pt reports:      Changes in eating patterns to promote health are noted below on the goals number 19-22    Vitals: Wt Readings from Last 3 Encounters:   05/18/22 (!) 361 lb (163.7 kg)   04/30/22 (!) 363 lb (164.7 kg)   04/13/22 (!) 368 lb (166.9 kg)         Nutrition Assessment:   PES: Knowledge deficit related to healthy behaviors that support weight management post weight loss surgery as evidenced by Body mass index is 66.03 kg/m². Nutrition Assessment of Goal Attainment:  TREATMENT GOALS:    1. Pt  Completed 5 out of 5 goals. 2.TREATMENT GOALS FOR UPCOMING WEEK: continue all previous goals and add: # 0    All goals were planned with and agreed on by the patient. I want to improve my health because I want to be more active  appt # NA G What is your next step? C 1 2 3 4 5 6 7 8 9     0  1 I will read the education binder provided to me and the   x 100               2 I will make my pschological evaluation appoinment. x              5  3 I will bring this goal card to every appointment. 100 100 100 100 100 100         x 4 I will eliminate all tobacco/nicotine. x 5 I will limit alcoholic beverages to 2-7VW per week. 5  6 I will limit dining out to 3 times per week or less. 75 100 100         x 7 I will eliminate sugary beverages. x 8 I will eliminate carbonated beverages. x 9 I will eliminate drinking with a straw. 10 I will limit caffeinated beverages to 16oz daily. x              5  11 I will limit log my exercise daily. 100 100 100 100 100 100        2  12 I will determine my calcium and mvi plan. x   100           2  13 I will have 1-2 servings of lean protein present at each meal and minimeal. x  50 100           2  14 I will eat every 3-5 hours.  x  50 100             15 I will drink 64oz of fluid daily. x              4  16 I will eat slowly during meals and snacks. x     100         5  17 I will limit fluids 4oz before after and during meals. x      100        5  18 I will eat protein first at all meals followed by vegetables,  Fruit and lastly whole grains. x      100          19 My first weight neutral approach is:                 20 My second weight neutral approach is:                 21  My Thirds weight neutral approach is:                 22                  23                  24                    Questions asked for goal understanding:                                                  Do you understand your goals? Do you have the information you need to achieve your goals? Do you have any questions  right now? []  Consistent goal achievement in the program thus far and further success with goals is expected. []  Unable to consistently make progress in goal achievement. At this time patient is not moving forward  in developing the skills needed for success after surgery. Plan:    Continue to follow monthly and review goals.          []  Nutrition visits complete    [x]

## 2022-05-19 NOTE — TELEPHONE ENCOUNTER
Patient's record has been submitted to the insurance company on 5/19/2022 for authorization to schedule surgery. Instructions to patient: if patient calls for status on authorization to schedule surgery please instruct patient that it could take up to 30 days for an authorization. Once authorization is received the insurance specialists will contact the patient to schedule their procedure.       Program fee paid in full yes

## 2022-05-19 NOTE — TELEPHONE ENCOUNTER
----- Message from LAURA Bazzi CNP sent at 5/18/2022 12:42 PM EDT -----  Ready to submit. CPAP on backorder.

## 2022-07-13 ENCOUNTER — HOSPITAL ENCOUNTER (OUTPATIENT)
Age: 47
Setting detail: SPECIMEN
Discharge: HOME | End: 2022-07-13

## 2022-07-13 DIAGNOSIS — M19.90 ARTHRITIS: ICD-10-CM

## 2022-07-13 DIAGNOSIS — I89.0 LYMPHEDEMA: ICD-10-CM

## 2022-07-13 DIAGNOSIS — R73.03 PREDIABETES: ICD-10-CM

## 2022-07-13 DIAGNOSIS — K44.9 HIATAL HERNIA WITH GERD: ICD-10-CM

## 2022-07-13 DIAGNOSIS — J45.20 MILD INTERMITTENT ASTHMA WITHOUT COMPLICATION: ICD-10-CM

## 2022-07-13 DIAGNOSIS — I10 ESSENTIAL HYPERTENSION, BENIGN: ICD-10-CM

## 2022-07-13 DIAGNOSIS — K21.9 HIATAL HERNIA WITH GERD: ICD-10-CM

## 2022-07-13 DIAGNOSIS — E55.9 VITAMIN D DEFICIENCY: ICD-10-CM

## 2022-07-13 DIAGNOSIS — E66.01 MORBID OBESITY (HCC): ICD-10-CM

## 2022-07-13 DIAGNOSIS — E88.81 INSULIN RESISTANCE: ICD-10-CM

## 2022-07-13 LAB — VITAMIN D 25-HYDROXY: 22.5 NG/ML

## 2022-07-14 DIAGNOSIS — E55.9 VITAMIN D DEFICIENCY: Primary | ICD-10-CM

## 2022-07-14 RX ORDER — ERGOCALCIFEROL 1.25 MG/1
50000 CAPSULE ORAL WEEKLY
Qty: 8 CAPSULE | Refills: 0 | Status: ON HOLD | OUTPATIENT
Start: 2022-07-14 | End: 2022-09-01 | Stop reason: HOSPADM

## 2022-07-17 LAB
3-OH-COTININE: <2 NG/ML
COTININE: <2 NG/ML
NICOTINE: <2 NG/ML

## 2022-08-15 ENCOUNTER — NURSE ONLY (OUTPATIENT)
Dept: BARIATRICS/WEIGHT MGMT | Age: 47
End: 2022-08-15

## 2022-08-15 RX ORDER — SODIUM CHLORIDE, SODIUM LACTATE, POTASSIUM CHLORIDE, CALCIUM CHLORIDE 600; 310; 30; 20 MG/100ML; MG/100ML; MG/100ML; MG/100ML
1000 INJECTION, SOLUTION INTRAVENOUS CONTINUOUS
Status: CANCELLED | OUTPATIENT
Start: 2022-08-15

## 2022-08-15 NOTE — H&P
History and Physical    Pt Name: Christiano Rodriguez  MRN: [id number]  YOB: 1975  Date of evaluation: 8/16/2022  Primary Care Physician: Tiffanie Mcneal DO    SUBJECTIVE:   History of Chief Complaint:    Christiano Rodriguez is a 50 y.o. female who presents for PAT appointment. Patient complains of obesity \"forever\". Patient has been scheduled for XI ROBOTIC LAPAROSCOPIC GASTRECTOMY SLEEVE, LIVER BIOPSY, EGD   Allergies  is allergic to dicloxacillin and metformin and related. Medications  Prior to Admission medications    Medication Sig Start Date End Date Taking? Authorizing Provider   cetirizine (ZYRTEC) 10 MG tablet TAKE 1 TABLET BY MOUTH EVERY DAY 8/15/22   Tiffanie Mcneal DO   vitamin D (ERGOCALCIFEROL) 1.25 MG (28780 UT) CAPS capsule Take 1 capsule by mouth once a week for 8 doses  Patient taking differently: Take 50,000 Units by mouth once a week On Thursday 7/14/22 9/2/22  LAURA Esteban - CNP   famotidine (PEPCID) 40 MG tablet Take 1 tablet by mouth 2 times daily 7/12/22   Tiffanie Mcneal DO   lisinopril (PRINIVIL;ZESTRIL) 20 MG tablet TAKE 1 TABLET BY MOUTH ONE TIME A DAY 7/12/22   Tiffanie Mcneal DO   montelukast (SINGULAIR) 10 MG tablet Take 1 tablet by mouth daily  Patient taking differently: Take 10 mg by mouth nightly 7/12/22   Tiffanie Mcneal DO   furosemide (LASIX) 40 MG tablet Take 1 tablet by mouth 2 times daily as needed (edema)  Patient taking differently: Take 40 mg by mouth in the morning.  7/12/22   Tiffanie Mcneal DO   pioglitazone (ACTOS) 15 MG tablet TAKE 1 TABLET BY MOUTH ONE TIME A DAY 7/12/22   Tiffanie Mcneal DO   acetaminophen (TYLENOL) 500 MG tablet Take 2 tablets by mouth 3 times daily as needed for Pain 7/12/22   Tiffanie Mcneal DO   albuterol sulfate  (90 Base) MCG/ACT inhaler Inhale 2 puffs into the lungs 4 times daily as needed for Wheezing 7/15/19   Tiffanie Mcneal DO   nystatin (MYCOSTATIN) 437443 UNIT/GM powder APPLY EXTERNALLY THREE TIMES A DAY   Patient taking differently: as needed 18   Luisa Trent DO     Past Medical History    has a past medical history of Allergic rhinitis, Arthritis, Asthma, Caffeine use, Class 3 obesity with serious comorbidity and body mass index (BMI) of 60.0 to 69.9 in adult, GERD (gastroesophageal reflux disease), Heartburn, History of echocardiogram, Hypertension, Insulin resistance, Intestinal disaccharidase deficiencies and disaccharide malabsorption, Iron deficiency anemia, unspecified, Lymphedema, Neural foraminal stenosis of cervical spine, Ringing in ears, Sleep apnea, Snoring, Urinary incontinence, Vitamin D deficiency, Wears glasses, and Wellness examination. Past Surgical History   has a past surgical history that includes Tonsillectomy; Foot surgery (Bilateral); Upper gastrointestinal endoscopy (N/A, 2022); Endometrial ablation; and cardiovascular stress test (). Social History   reports that she has never smoked. She has never used smokeless tobacco.    reports no history of alcohol use. reports no history of drug use. Marital Status single  Children one daughter  Occupation health insurance, office work  Family History  Family Status   Relation Name Status    Mother      Father      Brother  Alive    MGM  (Not Specified)    Other  (Not Specified)     family history includes COPD in her father; Cancer in her brother and mother; Depression in an other family member; Diabetes in her father; Heart Disease in her maternal grandmother; High Blood Pressure in her brother, father, mother, and another family member; Smitha Barer in her mother; Thyroid Cancer in her brother.     Review of Systems:  CONSTITUTIONAL:   negative for fevers, chills, fatigue and malaise    EYES:   negative for double vision, blurred vision and photophobia +glasses   HEENT:   negative for tinnitus, epistaxis and sore throat     RESPIRATORY:   negative for cough, shortness of breath, wheezing     CARDIOVASCULAR:   negative for chest pain, palpitations, syncope, edema     GASTROINTESTINAL:   negative for nausea, vomiting     GENITOURINARY:   negative for incontinence     MUSCULOSKELETAL:   negative for neck or back pain  +\"c-spine stenosis\", so sometimes has limitation   NEUROLOGICAL:   Negative for weakness and tingling  negative for headaches and dizziness     PSYCHIATRIC:   negative for anxiety       OBJECTIVE:   VITALS:  height is 5' 1\" (1.549 m) and weight is 378 lb (171.5 kg) (abnormal). Her temporal temperature is 97 °F (36.1 °C). Her blood pressure is 159/91 (abnormal) and her pulse is 87. Her respiration is 20 and oxygen saturation is 96%. CONSTITUTIONAL:alert & oriented x 3, no acute distress. Friendly. Very pleasant. SKIN:  Warm and dry, no rashes on exposed areas of skin. Tiny red raised lesion on her abdomen- no edema, no active drainage, looks superficial. (She states it looks better- I advised her to monitor closely and to inform surgeon about this to avoid delay in surgery- she verbalized understanding)   HEAD:  Normocephalic, atraumatic   EYES: EOMs intact. PERRL. Wearing glasses. EARS:  Equal bilaterally, no edema or thickening, skin is intact without lumps or lesions. No discharge. Hearing grossly WNL. NOSE:  Nares patent. No rhinorrhea   MOUTH/THROAT:  Mucous membranes moist, tongue is pink, uvula midline, teeth appear to be intact  NECK:full ROM, short,   LUNGS: Respirations even and non-labored. Clear to auscultation bilaterally, no wheezes, rales, or rhonchi. CARDIOVASCULAR: Regular rate and rhythm, no murmurs/rubs/gallops, somewhat limited d/t body habitus  ABDOMEN: soft, non-tender, non-distended, bowel sounds active x 4, obese, (see skin assessment)  EXTREMITIES: + lymphedema bilateral lower extremities. No varicosities bilateral lower extremities. NEUROLOGIC: CN II-XII are grossly intact.  Gait is smooth, rhythmic and effortless     Testing:   EKG: 8/16/22  Labs pending: drawn 8/16/2022   Chest XRay:  8/16/22  IMPRESSIONS:   Obesity   Htn   gerd  PLANS:   XI ROBOTIC LAPAROSCOPIC GASTRECTOMY SLEEVE, LIVER BIOPSY, EGD     LAURA PARSONS CNP  Electronically signed 8/16/2022 at 12:12 PM

## 2022-08-15 NOTE — H&P (VIEW-ONLY)
History and Physical    Pt Name: Rice Dance  MRN: [id number]  YOB: 1975  Date of evaluation: 8/16/2022  Primary Care Physician: Luisa Trent DO    SUBJECTIVE:   History of Chief Complaint:    Rice Dance is a 50 y.o. female who presents for PAT appointment. Patient complains of obesity \"forever\". Patient has been scheduled for XI ROBOTIC LAPAROSCOPIC GASTRECTOMY SLEEVE, LIVER BIOPSY, EGD   Allergies  is allergic to dicloxacillin and metformin and related. Medications  Prior to Admission medications    Medication Sig Start Date End Date Taking? Authorizing Provider   cetirizine (ZYRTEC) 10 MG tablet TAKE 1 TABLET BY MOUTH EVERY DAY 8/15/22   Luisa Trent, DO   vitamin D (ERGOCALCIFEROL) 1.25 MG (49139 UT) CAPS capsule Take 1 capsule by mouth once a week for 8 doses  Patient taking differently: Take 50,000 Units by mouth once a week On Thursday 7/14/22 9/2/22  LAURA Kwan CNP   famotidine (PEPCID) 40 MG tablet Take 1 tablet by mouth 2 times daily 7/12/22   Luisa Trent, DO   lisinopril (PRINIVIL;ZESTRIL) 20 MG tablet TAKE 1 TABLET BY MOUTH ONE TIME A DAY 7/12/22   Luisa [place], DO   montelukast (SINGULAIR) 10 MG tablet Take 1 tablet by mouth daily  Patient taking differently: Take 10 mg by mouth nightly 7/12/22   Luisa Trent, DO   furosemide (LASIX) 40 MG tablet Take 1 tablet by mouth 2 times daily as needed (edema)  Patient taking differently: Take 40 mg by mouth in the morning.  7/12/22   Luisa Trent, DO   pioglitazone (ACTOS) 15 MG tablet TAKE 1 TABLET BY MOUTH ONE TIME A DAY 7/12/22   Luisa [place], DO   acetaminophen (TYLENOL) 500 MG tablet Take 2 tablets by mouth 3 times daily as needed for Pain 7/12/22   Luisaie Burden, DO   albuterol sulfate  (90 Base) MCG/ACT inhaler Inhale 2 puffs into the lungs 4 times daily as needed for Wheezing 7/15/19   Luisa [place], DO   nystatin (MYCOSTATIN) 418506 UNIT/GM powder APPLY EXTERNALLY THREE TIMES A DAY   Patient taking differently: as needed 18   Reyna Davila DO     Past Medical History    has a past medical history of Allergic rhinitis, Arthritis, Asthma, Caffeine use, Class 3 obesity with serious comorbidity and body mass index (BMI) of 60.0 to 69.9 in adult, GERD (gastroesophageal reflux disease), Heartburn, History of echocardiogram, Hypertension, Insulin resistance, Intestinal disaccharidase deficiencies and disaccharide malabsorption, Iron deficiency anemia, unspecified, Lymphedema, Neural foraminal stenosis of cervical spine, Ringing in ears, Sleep apnea, Snoring, Urinary incontinence, Vitamin D deficiency, Wears glasses, and Wellness examination. Past Surgical History   has a past surgical history that includes Tonsillectomy; Foot surgery (Bilateral); Upper gastrointestinal endoscopy (N/A, 2022); Endometrial ablation; and cardiovascular stress test (). Social History   reports that she has never smoked. She has never used smokeless tobacco.    reports no history of alcohol use. reports no history of drug use. Marital Status single  Children one daughter  Occupation health insurance, office work  Family History  Family Status   Relation Name Status    Mother      Father      Brother  Alive    MGM  (Not Specified)    Other  (Not Specified)     family history includes COPD in her father; Cancer in her brother and mother; Depression in an other family member; Diabetes in her father; Heart Disease in her maternal grandmother; High Blood Pressure in her brother, father, mother, and another family member; Lilo Pilling in her mother; Thyroid Cancer in her brother.     Review of Systems:  CONSTITUTIONAL:   negative for fevers, chills, fatigue and malaise    EYES:   negative for double vision, blurred vision and photophobia +glasses   HEENT:   negative for tinnitus, epistaxis and sore throat     RESPIRATORY:   negative for cough, shortness of breath, wheezing     CARDIOVASCULAR:   negative for chest pain, palpitations, syncope, edema     GASTROINTESTINAL:   negative for nausea, vomiting     GENITOURINARY:   negative for incontinence     MUSCULOSKELETAL:   negative for neck or back pain  +\"c-spine stenosis\", so sometimes has limitation   NEUROLOGICAL:   Negative for weakness and tingling  negative for headaches and dizziness     PSYCHIATRIC:   negative for anxiety       OBJECTIVE:   VITALS:  height is 5' 1\" (1.549 m) and weight is 378 lb (171.5 kg) (abnormal). Her temporal temperature is 97 °F (36.1 °C). Her blood pressure is 159/91 (abnormal) and her pulse is 87. Her respiration is 20 and oxygen saturation is 96%. CONSTITUTIONAL:alert & oriented x 3, no acute distress. Friendly. Very pleasant. SKIN:  Warm and dry, no rashes on exposed areas of skin. Tiny red raised lesion on her abdomen- no edema, no active drainage, looks superficial. (She states it looks better- I advised her to monitor closely and to inform surgeon about this to avoid delay in surgery- she verbalized understanding)   HEAD:  Normocephalic, atraumatic   EYES: EOMs intact. PERRL. Wearing glasses. EARS:  Equal bilaterally, no edema or thickening, skin is intact without lumps or lesions. No discharge. Hearing grossly WNL. NOSE:  Nares patent. No rhinorrhea   MOUTH/THROAT:  Mucous membranes moist, tongue is pink, uvula midline, teeth appear to be intact  NECK:full ROM, short,   LUNGS: Respirations even and non-labored. Clear to auscultation bilaterally, no wheezes, rales, or rhonchi. CARDIOVASCULAR: Regular rate and rhythm, no murmurs/rubs/gallops, somewhat limited d/t body habitus  ABDOMEN: soft, non-tender, non-distended, bowel sounds active x 4, obese, (see skin assessment)  EXTREMITIES: + lymphedema bilateral lower extremities. No varicosities bilateral lower extremities. NEUROLOGIC: CN II-XII are grossly intact.  Gait is smooth, rhythmic and effortless     Testing:   EKG: 8/16/22  Labs pending: drawn 8/16/2022   Chest XRay:  8/16/22  IMPRESSIONS:   Obesity   Htn   gerd  PLANS:   XI ROBOTIC LAPAROSCOPIC GASTRECTOMY SLEEVE, LIVER BIOPSY, EGD     LAURA PARSONS CNP  Electronically signed 8/16/2022 at 12:12 PM

## 2022-08-15 NOTE — PROGRESS NOTES
Anesthesia Focused Assessment    Hx of anesthesia complications:  no  Family hx of anesthesia complications:  no      Prior + Covid-19 test? Has never TESTED positive but had symptoms when her daughter was positive- April 2022  Symptoms/Hospitalization?:fever, cough, runny nose, fatigue, body aches, no hospitalization, sxs lasted 10-12 days, fatigue x 3-4 weeks. Patient vaccinated: no booster yet      ----------------------------------------------------------------------------------------------------------------------  SUMA:                                             yes  If yes, machine?:                          c-pap, instructed to bring DOS    Type 1 DM:                                   no  T2DM:                                           no    Coronary Artery Disease:             no  Hypertension:                               yes  Defib / AICD / Pacemaker:           no    Renal Failure:                               no  If yes, on dialysis? :                           Active smoker:                              no  Drinks Alcohol:                              no  Illicit drugs:                                    no    Dentition:                                      left bottom, crown    Past Medical History:   Diagnosis Date    Allergic rhinitis     Arthritis     Asthma     Caffeine use     1 coffee, 1-3 tea daily    Class 3 obesity with serious comorbidity and body mass index (BMI) of 60.0 to 69.9 in adult 01/18/2018    GERD (gastroesophageal reflux disease)     Heartburn     History of echocardiogram 02/2022    Hypertension     Insulin resistance 09/09/2014    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 Varsha Nagy. [place]     Patient was evaluated in PAT & anesthesia guidelines were applied. NPO guidelines, medication instructions and scheduled arrival time were reviewed with patient. I advised patient to please contact your surgeons office, ahead of time if possible, if any new signs or symptoms of illness, infection, rash, etc. Patient verbalized understanding.                                                                                                                         Anesthesia contacted:   no    Medical or cardiac clearance ordered: medical clearance pending PAT results- will request copy    LAURA Ybarra CNP  Electronically signed 8/16/2022 at 12:07 PM

## 2022-08-15 NOTE — DISCHARGE INSTRUCTIONS
Pre-operative Instructions           NOTHING to eat or drink after midnight the night prior to surgery EXCEPT GATORADE PER SURGEON   (This includes gum, candy, mints, chewing tobacco, etc). Please arrive at the surgery center by 5:30 AM  on 8/31/2022 (or as directed by your surgeon's office). See Directons to Surgery Center below. Please take only the following medication(s) the day of surgery with a small sip of water: Lisinopril (Prinivil), Famotidine (Pepcid)    Please stop any blood thinning medications  AS DIRECTED BY PRESCRIBING PROVIDER! :  Ibuprofen  Failure to stop these medications as instructed (too soon or too late) may result in injury to you, or your surgery may need to be rescheduled. Below is a list of some examples for your reference. Antiplatelets : (stop blood cells (called platelets) from sticking together and forming a blood clot):   Aspirin (Bufferin, Ecotrin), Clopidogrel (Plavix), Ticagrelor (Brilinta), Prasugrel (Effient), Dipyridamole/aspirin (Aggrenox), Ticlopidine (Ticlid), Eptifibatide (Integrilin)    Anticoagulants: (slow down your body's process of making clots): Warfarin (Coumadin), Rivaroxaban (Xarelto), Dabigatran (Pradaxa), Apixaban (Eliquis), Edoxaban (Savaysa), Heparin/Enoxaparin (Lovenox), Fondaparinux (Arixtra)    NSAIDS: Aspirin (Bufferin, Ecotrin), Ibuprofen (Motrin, Nuprin,Advil), Naproxen (Aleve),Meloxicam (Mobic), Celecoxib (Celebrex), Diclofenac (Voltaren), Etodolac (Lodine), Indomethacin, Ketorolac, Nabumetone, Oxaprozin (Daypro), Piroxicam (Feldene), Excedrin (has aspirin in it)    Herbal supplements: Bromelain, Cinnamon, [employer], [place] (female ginseng), Fish oil, Garlic, Chrissie,Ginkgo biloba, Grape seed extract, Turmeric, Vitamin E, etc....)    You may continue the rest of your medications through the night before surgery unless instructed otherwise. If applicable:   Do not take diabetic medications on the day of surgery.   Please use/bring daily inhalers with you     8/16/22  11:33 AM      ___________________  _______________________  Signature (Provider)              Signature (Patient)     Day of Surgery/Procedure    As a patient at [facility] you can expect quality medical and nursing care that is centered on your individual needs. Our goal is to make your surgical experience as comfortable as possible    Directions to the [address] [place] [address]. [facility] is located in the Emergency Room parking lot on [facility] or there is additional parking across the street. The address is [address] [place] [address]. Please check in at the [facility] upon arrival.     Patient Instructions  In case of any illness please contact your surgeons office for instructions prior to coming to the hospital.    Due to current restrictions you are only allowed 2 adults to be with you. Masks are to be worn and screening will take place on arrival.     Bring your current list of medications, vitamins, herbals and anything you might take on an  \"as needed \" basis. It is important we have a correct list with dosages and frequencies. Please verify your list with your medications at home or bring all of your bottles with you. If you have been given a blood band be sure to bring it with you on the day of surgery. Do not put it on or close the clasp. Use and bring any inhalers if you are currently using one. It is ok to brush your teeth but do not swallow any water. You may be required to provide a urine sample upon your arrival to the pre-op area, so please take this into consideration prior to using the restroom. No jewelry or piercing's to be worn into surgery because you might be injured because of them. No contact lenses to be worn. It is ok to wear your glasses in pre op but they will be removed prior to going into the operating room.     Dentures/partials will likely need to be removed in pre op

## 2022-08-16 ENCOUNTER — HOSPITAL ENCOUNTER (OUTPATIENT)
Dept: GENERAL RADIOLOGY | Age: 47
Discharge: HOME | End: 2022-08-18
Payer: COMMERCIAL

## 2022-08-16 ENCOUNTER — HOSPITAL ENCOUNTER (OUTPATIENT)
Dept: PREADMISSION TESTING | Age: 47
Discharge: HOME | End: 2022-08-20
Payer: COMMERCIAL

## 2022-08-16 VITALS
TEMPERATURE: 97 F | HEIGHT: 61 IN | RESPIRATION RATE: 20 BRPM | SYSTOLIC BLOOD PRESSURE: 159 MMHG | OXYGEN SATURATION: 96 % | DIASTOLIC BLOOD PRESSURE: 91 MMHG | HEART RATE: 87 BPM | BODY MASS INDEX: 55.32 KG/M2 | WEIGHT: 293 LBS

## 2022-08-16 DIAGNOSIS — E66.01 MORBID OBESITY (HCC): ICD-10-CM

## 2022-08-16 LAB
ANION GAP SERPL CALCULATED.3IONS-SCNC: 13 MMOL/L (ref 9–17)
BUN BLDV-MCNC: 19 MG/DL (ref 6–20)
CALCIUM SERPL-MCNC: 9.5 MG/DL (ref 8.6–10.4)
CHLORIDE BLD-SCNC: 101 MMOL/L (ref 98–107)
CO2: 24 MMOL/L (ref 20–31)
CREAT SERPL-MCNC: 0.74 MG/DL (ref 0.5–0.9)
GFR AFRICAN AMERICAN: >60 ML/MIN
GFR NON-AFRICAN AMERICAN: >60 ML/MIN
GFR SERPL CREATININE-BSD FRML MDRD: NORMAL ML/MIN/{1.73_M2}
GLUCOSE BLD-MCNC: 95 MG/DL (ref 70–99)
HCT VFR BLD CALC: 39.3 % (ref 36.3–47.1)
HEMOGLOBIN: 12.3 G/DL (ref 11.9–15.1)
INR BLD: 1
MCH RBC QN AUTO: 27.3 PG (ref 25.2–33.5)
MCHC RBC AUTO-ENTMCNC: 31.3 G/DL (ref 28.4–34.8)
MCV RBC AUTO: 87.3 FL (ref 82.6–102.9)
NRBC AUTOMATED: 0 PER 100 WBC
PARTIAL THROMBOPLASTIN TIME: 26.4 SEC (ref 20.5–30.5)
PDW BLD-RTO: 14 % (ref 11.8–14.4)
PLATELET # BLD: 272 K/UL (ref 138–453)
PMV BLD AUTO: 9.8 FL (ref 8.1–13.5)
POTASSIUM SERPL-SCNC: 4.1 MMOL/L (ref 3.7–5.3)
PROTHROMBIN TIME: 10.7 SEC (ref 9.1–12.3)
RBC # BLD: 4.5 M/UL (ref 3.95–5.11)
SODIUM BLD-SCNC: 138 MMOL/L (ref 135–144)
WBC # BLD: 9 K/UL (ref 3.5–11.3)

## 2022-08-16 PROCEDURE — 71046 X-RAY EXAM CHEST 2 VIEWS: CPT

## 2022-08-16 PROCEDURE — G0480 DRUG TEST DEF 1-7 CLASSES: HCPCS

## 2022-08-16 PROCEDURE — 85027 COMPLETE CBC AUTOMATED: CPT

## 2022-08-16 PROCEDURE — 85730 THROMBOPLASTIN TIME PARTIAL: CPT

## 2022-08-16 PROCEDURE — 36415 COLL VENOUS BLD VENIPUNCTURE: CPT

## 2022-08-16 PROCEDURE — 93005 ELECTROCARDIOGRAM TRACING: CPT | Performed by: SURGERY

## 2022-08-16 PROCEDURE — 80048 BASIC METABOLIC PNL TOTAL CA: CPT

## 2022-08-16 PROCEDURE — 85610 PROTHROMBIN TIME: CPT

## 2022-08-16 RX ORDER — HEPARIN SODIUM 5000 [USP'U]/ML
5000 INJECTION, SOLUTION INTRAVENOUS; SUBCUTANEOUS ONCE
Status: CANCELLED | OUTPATIENT
Start: 2022-08-16 | End: 2022-08-16

## 2022-08-17 LAB
EKG ATRIAL RATE: 81 BPM
EKG P AXIS: 37 DEGREES
EKG P-R INTERVAL: 174 MS
EKG Q-T INTERVAL: 378 MS
EKG QRS DURATION: 90 MS
EKG QTC CALCULATION (BAZETT): 439 MS
EKG R AXIS: 27 DEGREES
EKG T AXIS: 32 DEGREES
EKG VENTRICULAR RATE: 81 BPM

## 2022-08-17 PROCEDURE — 93010 ELECTROCARDIOGRAM REPORT: CPT | Performed by: INTERNAL MEDICINE

## 2022-08-20 LAB
COTININE: <5 NG/ML
NICOTINE: <5 NG/ML

## 2022-08-23 ENCOUNTER — OFFICE VISIT (OUTPATIENT)
Dept: BARIATRICS/WEIGHT MGMT | Age: 47
End: 2022-08-23
Payer: COMMERCIAL

## 2022-08-23 VITALS
DIASTOLIC BLOOD PRESSURE: 60 MMHG | HEIGHT: 61 IN | WEIGHT: 293 LBS | SYSTOLIC BLOOD PRESSURE: 110 MMHG | HEART RATE: 90 BPM | BODY MASS INDEX: 55.32 KG/M2

## 2022-08-23 DIAGNOSIS — K21.9 HIATAL HERNIA WITH GERD: Primary | ICD-10-CM

## 2022-08-23 DIAGNOSIS — E66.01 MORBID OBESITY WITH BMI OF 60.0-69.9, ADULT (HCC): ICD-10-CM

## 2022-08-23 DIAGNOSIS — K44.9 HIATAL HERNIA WITH GERD: Primary | ICD-10-CM

## 2022-08-23 PROCEDURE — 99214 OFFICE O/P EST MOD 30 MIN: CPT | Performed by: SURGERY

## 2022-08-29 NOTE — PROGRESS NOTES
[address] [facility] [address] INVASIVE BARIATRIC SURG  [address]  [address]  [facility] [address]  Dept: [phone]    SURGICAL WEIGHT MANAGEMENT PROGRAM   PROGRESS NOTE - SURGICAL EVALUATION    CC: Weight Loss       Patient: Shannon Rivero        Service Date: 8/23/2022       Medical Record #: [id number]    Patient History/Assessment Summary:    The patient is a pleasant 50y.o. year old female  with morbid obesity, who stands Height: 5' 1\" (154.9 cm) tall with a weight of Weight: (!) 360 lb (163.3 kg) , resulting in a BMI of Body mass index is 68.02 kg/m². .     This patient is alone for the evaluation today. The patient is being evaluated to undergo weight loss surgery to treat the following comorbid conditions caused by her morbid obesity: Hypertension, Asthma, Obstructive Sleep Apnea, Dyspnea on Exertion, GERD, and Stress Urinary Incontinence    She attended the weight loss surgery seminar, and attended bariatric education    Last Visit Weight:   Wt Readings from Last 3 Encounters:   08/23/22 (!) 360 lb (163.3 kg)   08/16/22 (!) 378 lb (171.5 kg)   07/12/22 (!) 372 lb (168.7 kg)     Today's weight is decreased from the last visit. Highest Weight:     The patient is being evaluated for Laparoscopic Sleeve Gastrectomy and Laparoscopic Hiatal Hernia. She  is here today to review the details of surgery. The patient acknowledges and understands the risks, benefits, and options we have discussed, as outlined in the Additional Informed Consent for this procedure. Patient also understands the importance of surgical and post-operative recommendations, including the operative diet and regular post-operative follow up care. The importance of ambulation and incentive spirometry was also discussed. All questions of this patient and any family members present have been answered to their satisfaction.     Physical Examination:     /60 (Site: Right Upper Arm, Position: Sitting, Cuff Size: Large Adult)   Pulse 90   Ht 5' 1\" (1.549 m)   Wt (!) 360 lb (163.3 kg)   BMI 68.02 kg/m²   General This patient is awake, alert, and oriented, and is in no apparent distress. Cardiac Regular rate and rhythm without evidence of murmur. Respiratory Clear to auscultation bilaterally. Abdomen Obese, soft, non-tender, non-distended without masses/ No  evidence of abdominal hernia / Incisions consistent with previous surgeries. Head and Neck Obese, normocephalic and atraumatic/soft and supple, no  lymphadenopathy or obvious bruits. Extremeties No cyanosis, clubbing or edema/ No calf tenderness/No  restrictions of movement, is ambulatory without assistance. Neurological Intact x 4 extremities, no focal deficits noted   Skin No rashes or lesions noted   Rectal Deferred     RECOMMENDATIONS:       Diagnosis Orders   1. Hiatal hernia with GERD        2. Morbid obesity with BMI of 60.0-69.9, adult ([facility] [address]               We spent a great deal of time discussing the risks and benefits of Laparoscopic Sleeve Gastrectomy and Laparoscopic Hiatal Hernia, including but not limited to injury to intra-abdominal organs, breakdown of the gastric staple line, the need for re-operative therapy,  prolonged hospitalization,  mechanical ventilation,  and death. We discussed the possibility of bleeding, the need for blood transfusions, blood clots, hospital-acquired and intra-abdominal infection, anastomotic stricture, and worsening GERD. And we discussed the need for post-operative visit compliance, behavior modifications and diet changes, protein and vitamin supplementation, as well as routine scheduled and dedicated exercise. We discussed the potential weight loss benefit of approximately 50-60% of her excess body weight at 12-18 months post-op, as well as the possibility of insufficient weight loss or weight gain after 2 years post-operative time.      The following was discussed with the patient:    DVT Prophylaxis    GERD with hiatal hernia  Surgical options  EGD reviewed with patient    Morbid obesity, BMI 68  Need for long term diet and exercise to sustain weight loss  Medical and surgical options given  Decision for surgery  Increased risks given BMI    Electronically signed by Harsh Flood DO on 8/29/2022 at 6:16 PM    Please note that this chart was generated using voice recognition Dragon dictation software. Although every effort was made to ensure the accuracy of this automated transcription, some errors in transcription may have occurred.

## 2022-08-31 ENCOUNTER — HOSPITAL ENCOUNTER (OUTPATIENT)
Age: 47
Setting detail: OBSERVATION
Discharge: HOME | End: 2022-09-01
Attending: SURGERY | Admitting: SURGERY
Payer: COMMERCIAL

## 2022-08-31 ENCOUNTER — ANESTHESIA (OUTPATIENT)
Dept: OPERATING ROOM | Age: 47
End: 2022-08-31
Payer: COMMERCIAL

## 2022-08-31 ENCOUNTER — ANESTHESIA EVENT (OUTPATIENT)
Dept: OPERATING ROOM | Age: 47
End: 2022-08-31
Payer: COMMERCIAL

## 2022-08-31 DIAGNOSIS — I10 HYPERTENSION, UNSPECIFIED TYPE: ICD-10-CM

## 2022-08-31 DIAGNOSIS — E66.01 MORBID OBESITY (HCC): ICD-10-CM

## 2022-08-31 DIAGNOSIS — R73.03 PREDIABETES: ICD-10-CM

## 2022-08-31 DIAGNOSIS — G89.18 POSTOPERATIVE PAIN: Primary | ICD-10-CM

## 2022-08-31 PROBLEM — Z98.84 S/P LAPAROSCOPIC SLEEVE GASTRECTOMY: Status: ACTIVE | Noted: 2022-08-31

## 2022-08-31 LAB
ANION GAP SERPL CALCULATED.3IONS-SCNC: 15 MMOL/L (ref 9–17)
BUN BLDV-MCNC: 31 MG/DL (ref 6–20)
CALCIUM SERPL-MCNC: 9.3 MG/DL (ref 8.6–10.4)
CHLORIDE BLD-SCNC: 97 MMOL/L (ref 98–107)
CO2: 20 MMOL/L (ref 20–31)
CREAT SERPL-MCNC: 0.98 MG/DL (ref 0.5–0.9)
GFR AFRICAN AMERICAN: >60 ML/MIN
GFR NON-AFRICAN AMERICAN: >60 ML/MIN
GFR SERPL CREATININE-BSD FRML MDRD: ABNORMAL ML/MIN/{1.73_M2}
GLUCOSE BLD-MCNC: 130 MG/DL (ref 70–99)
GLUCOSE BLD-MCNC: 144 MG/DL (ref 65–105)
GLUCOSE BLD-MCNC: 87 MG/DL (ref 74–100)
HCT VFR BLD CALC: 35.9 % (ref 36.3–47.1)
HEMOGLOBIN: 12.6 G/DL (ref 11.9–15.1)
MCH RBC QN AUTO: 29 PG (ref 25.2–33.5)
MCHC RBC AUTO-ENTMCNC: 35.1 G/DL (ref 28.4–34.8)
MCV RBC AUTO: 82.5 FL (ref 82.6–102.9)
NRBC AUTOMATED: 0 PER 100 WBC
PDW BLD-RTO: 14 % (ref 11.8–14.4)
PLATELET # BLD: 320 K/UL (ref 138–453)
PMV BLD AUTO: 12.4 FL (ref 8.1–13.5)
POC POTASSIUM: 3.9 MMOL/L (ref 3.5–4.5)
POTASSIUM SERPL-SCNC: 3.8 MMOL/L (ref 3.7–5.3)
RBC # BLD: 4.35 M/UL (ref 3.95–5.11)
SODIUM BLD-SCNC: 132 MMOL/L (ref 135–144)
WBC # BLD: 11.6 K/UL (ref 3.5–11.3)

## 2022-08-31 PROCEDURE — 6360000002 HC RX W HCPCS: Performed by: SURGERY

## 2022-08-31 PROCEDURE — 43775 LAP SLEEVE GASTRECTOMY: CPT | Performed by: SURGERY

## 2022-08-31 PROCEDURE — 84132 ASSAY OF SERUM POTASSIUM: CPT

## 2022-08-31 PROCEDURE — 2580000003 HC RX 258: Performed by: STUDENT IN AN ORGANIZED HEALTH CARE EDUCATION/TRAINING PROGRAM

## 2022-08-31 PROCEDURE — 6360000002 HC RX W HCPCS

## 2022-08-31 PROCEDURE — 2720000010 HC SURG SUPPLY STERILE: Performed by: SURGERY

## 2022-08-31 PROCEDURE — 2709999900 HC NON-CHARGEABLE SUPPLY: Performed by: SURGERY

## 2022-08-31 PROCEDURE — 3700000001 HC ADD 15 MINUTES (ANESTHESIA): Performed by: SURGERY

## 2022-08-31 PROCEDURE — 2580000003 HC RX 258: Performed by: SURGERY

## 2022-08-31 PROCEDURE — 3700000000 HC ANESTHESIA ATTENDED CARE: Performed by: SURGERY

## 2022-08-31 PROCEDURE — 96372 THER/PROPH/DIAG INJ SC/IM: CPT

## 2022-08-31 PROCEDURE — 6370000000 HC RX 637 (ALT 250 FOR IP): Performed by: SURGERY

## 2022-08-31 PROCEDURE — 88307 TISSUE EXAM BY PATHOLOGIST: CPT

## 2022-08-31 PROCEDURE — S2900 ROBOTIC SURGICAL SYSTEM: HCPCS | Performed by: SURGERY

## 2022-08-31 PROCEDURE — 3600000019 HC SURGERY ROBOT ADDTL 15MIN: Performed by: SURGERY

## 2022-08-31 PROCEDURE — 7100000000 HC PACU RECOVERY - FIRST 15 MIN: Performed by: SURGERY

## 2022-08-31 PROCEDURE — 80048 BASIC METABOLIC PNL TOTAL CA: CPT

## 2022-08-31 PROCEDURE — L3650 SO 8 ABD RESTRAINT PRE OTS: HCPCS | Performed by: SURGERY

## 2022-08-31 PROCEDURE — 7100000001 HC PACU RECOVERY - ADDTL 15 MIN: Performed by: SURGERY

## 2022-08-31 PROCEDURE — 2500000003 HC RX 250 WO HCPCS

## 2022-08-31 PROCEDURE — 36415 COLL VENOUS BLD VENIPUNCTURE: CPT

## 2022-08-31 PROCEDURE — 2500000003 HC RX 250 WO HCPCS: Performed by: SURGERY

## 2022-08-31 PROCEDURE — 6360000002 HC RX W HCPCS: Performed by: ANESTHESIOLOGY

## 2022-08-31 PROCEDURE — 3600000009 HC SURGERY ROBOT BASE: Performed by: SURGERY

## 2022-08-31 PROCEDURE — 85027 COMPLETE CBC AUTOMATED: CPT

## 2022-08-31 PROCEDURE — 82947 ASSAY GLUCOSE BLOOD QUANT: CPT

## 2022-08-31 PROCEDURE — G0378 HOSPITAL OBSERVATION PER HR: HCPCS

## 2022-08-31 RX ORDER — SODIUM CHLORIDE 9 MG/ML
INJECTION, SOLUTION INTRAVENOUS PRN
Status: DISCONTINUED | OUTPATIENT
Start: 2022-08-31 | End: 2022-09-01 | Stop reason: HOSPADM

## 2022-08-31 RX ORDER — LIDOCAINE HYDROCHLORIDE 10 MG/ML
INJECTION, SOLUTION EPIDURAL; INFILTRATION; INTRACAUDAL; PERINEURAL PRN
Status: DISCONTINUED | OUTPATIENT
Start: 2022-08-31 | End: 2022-08-31 | Stop reason: SDUPTHER

## 2022-08-31 RX ORDER — PHENYLEPHRINE HYDROCHLORIDE 10 MG/ML
INJECTION INTRAVENOUS PRN
Status: DISCONTINUED | OUTPATIENT
Start: 2022-08-31 | End: 2022-08-31 | Stop reason: SDUPTHER

## 2022-08-31 RX ORDER — PROMETHAZINE HYDROCHLORIDE 12.5 MG/1
25 TABLET ORAL EVERY 6 HOURS PRN
Status: DISCONTINUED | OUTPATIENT
Start: 2022-08-31 | End: 2022-09-01 | Stop reason: HOSPADM

## 2022-08-31 RX ORDER — SODIUM CHLORIDE 0.9 % (FLUSH) 0.9 %
5-40 SYRINGE (ML) INJECTION PRN
Status: DISCONTINUED | OUTPATIENT
Start: 2022-08-31 | End: 2022-08-31 | Stop reason: HOSPADM

## 2022-08-31 RX ORDER — ONDANSETRON 2 MG/ML
4 INJECTION INTRAMUSCULAR; INTRAVENOUS EVERY 6 HOURS PRN
Status: DISCONTINUED | OUTPATIENT
Start: 2022-08-31 | End: 2022-09-01 | Stop reason: HOSPADM

## 2022-08-31 RX ORDER — SODIUM CHLORIDE 9 MG/ML
INJECTION, SOLUTION INTRAVENOUS PRN
Status: DISCONTINUED | OUTPATIENT
Start: 2022-08-31 | End: 2022-08-31 | Stop reason: HOSPADM

## 2022-08-31 RX ORDER — SODIUM CHLORIDE 0.9 % (FLUSH) 0.9 %
5-40 SYRINGE (ML) INJECTION EVERY 12 HOURS SCHEDULED
Status: DISCONTINUED | OUTPATIENT
Start: 2022-08-31 | End: 2022-08-31 | Stop reason: HOSPADM

## 2022-08-31 RX ORDER — ENOXAPARIN SODIUM 100 MG/ML
60 INJECTION SUBCUTANEOUS 2 TIMES DAILY
Qty: 16.8 ML | Refills: 0 | Status: SHIPPED | OUTPATIENT
Start: 2022-08-31 | End: 2022-09-14

## 2022-08-31 RX ORDER — DIPHENHYDRAMINE HYDROCHLORIDE 50 MG/ML
INJECTION INTRAMUSCULAR; INTRAVENOUS PRN
Status: DISCONTINUED | OUTPATIENT
Start: 2022-08-31 | End: 2022-08-31 | Stop reason: SDUPTHER

## 2022-08-31 RX ORDER — MAGNESIUM HYDROXIDE 1200 MG/15ML
LIQUID ORAL CONTINUOUS PRN
Status: DISCONTINUED | OUTPATIENT
Start: 2022-08-31 | End: 2022-08-31 | Stop reason: HOSPADM

## 2022-08-31 RX ORDER — PROMETHAZINE HYDROCHLORIDE 25 MG/1
25 TABLET ORAL EVERY 6 HOURS PRN
Qty: 28 TABLET | Refills: 0 | Status: SHIPPED | OUTPATIENT
Start: 2022-08-31

## 2022-08-31 RX ORDER — HEPARIN SODIUM 5000 [USP'U]/ML
5000 INJECTION, SOLUTION INTRAVENOUS; SUBCUTANEOUS EVERY 8 HOURS SCHEDULED
Status: DISCONTINUED | OUTPATIENT
Start: 2022-08-31 | End: 2022-09-01 | Stop reason: HOSPADM

## 2022-08-31 RX ORDER — HEPARIN SODIUM 5000 [USP'U]/ML
5000 INJECTION, SOLUTION INTRAVENOUS; SUBCUTANEOUS ONCE
Status: COMPLETED | OUTPATIENT
Start: 2022-08-31 | End: 2022-08-31

## 2022-08-31 RX ORDER — CYCLOBENZAPRINE HCL 10 MG
10 TABLET ORAL 3 TIMES DAILY PRN
Qty: 28 TABLET | Refills: 0 | Status: SHIPPED | OUTPATIENT
Start: 2022-08-31

## 2022-08-31 RX ORDER — FENTANYL CITRATE 50 UG/ML
INJECTION, SOLUTION INTRAMUSCULAR; INTRAVENOUS PRN
Status: DISCONTINUED | OUTPATIENT
Start: 2022-08-31 | End: 2022-08-31 | Stop reason: SDUPTHER

## 2022-08-31 RX ORDER — BUPIVACAINE HYDROCHLORIDE 5 MG/ML
INJECTION, SOLUTION PERINEURAL PRN
Status: DISCONTINUED | OUTPATIENT
Start: 2022-08-31 | End: 2022-08-31 | Stop reason: HOSPADM

## 2022-08-31 RX ORDER — SODIUM CHLORIDE 0.9 % (FLUSH) 0.9 %
5-40 SYRINGE (ML) INJECTION EVERY 12 HOURS SCHEDULED
Status: DISCONTINUED | OUTPATIENT
Start: 2022-08-31 | End: 2022-09-01 | Stop reason: HOSPADM

## 2022-08-31 RX ORDER — CYCLOBENZAPRINE HCL 10 MG
10 TABLET ORAL 3 TIMES DAILY PRN
Status: DISCONTINUED | OUTPATIENT
Start: 2022-08-31 | End: 2022-09-01 | Stop reason: HOSPADM

## 2022-08-31 RX ORDER — SODIUM CHLORIDE 0.9 % (FLUSH) 0.9 %
5-40 SYRINGE (ML) INJECTION PRN
Status: DISCONTINUED | OUTPATIENT
Start: 2022-08-31 | End: 2022-09-01 | Stop reason: HOSPADM

## 2022-08-31 RX ORDER — SCOLOPAMINE TRANSDERMAL SYSTEM 1 MG/1
1 PATCH, EXTENDED RELEASE TRANSDERMAL
Status: DISCONTINUED | OUTPATIENT
Start: 2022-08-31 | End: 2022-09-01 | Stop reason: HOSPADM

## 2022-08-31 RX ORDER — GLYCOPYRROLATE 0.2 MG/ML
INJECTION INTRAMUSCULAR; INTRAVENOUS PRN
Status: DISCONTINUED | OUTPATIENT
Start: 2022-08-31 | End: 2022-08-31 | Stop reason: SDUPTHER

## 2022-08-31 RX ORDER — ONDANSETRON 2 MG/ML
INJECTION INTRAMUSCULAR; INTRAVENOUS PRN
Status: DISCONTINUED | OUTPATIENT
Start: 2022-08-31 | End: 2022-08-31 | Stop reason: SDUPTHER

## 2022-08-31 RX ORDER — MORPHINE SULFATE 2 MG/ML
2 INJECTION, SOLUTION INTRAMUSCULAR; INTRAVENOUS EVERY 5 MIN PRN
Status: COMPLETED | OUTPATIENT
Start: 2022-08-31 | End: 2022-08-31

## 2022-08-31 RX ORDER — OXYCODONE HYDROCHLORIDE AND ACETAMINOPHEN 5; 325 MG/1; MG/1
1 TABLET ORAL EVERY 6 HOURS PRN
Qty: 28 TABLET | Refills: 0 | Status: SHIPPED | OUTPATIENT
Start: 2022-08-31 | End: 2022-09-07

## 2022-08-31 RX ORDER — IPRATROPIUM BROMIDE AND ALBUTEROL SULFATE 2.5; .5 MG/3ML; MG/3ML
1 SOLUTION RESPIRATORY (INHALATION)
Status: DISCONTINUED | OUTPATIENT
Start: 2022-08-31 | End: 2022-09-01 | Stop reason: HOSPADM

## 2022-08-31 RX ORDER — OXYCODONE HYDROCHLORIDE AND ACETAMINOPHEN 5; 325 MG/1; MG/1
2 TABLET ORAL EVERY 6 HOURS PRN
Status: DISCONTINUED | OUTPATIENT
Start: 2022-08-31 | End: 2022-09-01 | Stop reason: HOSPADM

## 2022-08-31 RX ORDER — PANTOPRAZOLE SODIUM 40 MG/1
40 TABLET, DELAYED RELEASE ORAL DAILY
Status: DISCONTINUED | OUTPATIENT
Start: 2022-08-31 | End: 2022-09-01 | Stop reason: HOSPADM

## 2022-08-31 RX ORDER — DIPHENHYDRAMINE HYDROCHLORIDE 50 MG/ML
12.5 INJECTION INTRAMUSCULAR; INTRAVENOUS
Status: DISCONTINUED | OUTPATIENT
Start: 2022-08-31 | End: 2022-08-31 | Stop reason: HOSPADM

## 2022-08-31 RX ORDER — PROPOFOL 10 MG/ML
INJECTION, EMULSION INTRAVENOUS PRN
Status: DISCONTINUED | OUTPATIENT
Start: 2022-08-31 | End: 2022-08-31 | Stop reason: SDUPTHER

## 2022-08-31 RX ORDER — MIDAZOLAM HYDROCHLORIDE 1 MG/ML
INJECTION INTRAMUSCULAR; INTRAVENOUS PRN
Status: DISCONTINUED | OUTPATIENT
Start: 2022-08-31 | End: 2022-08-31 | Stop reason: SDUPTHER

## 2022-08-31 RX ORDER — FENTANYL CITRATE 50 UG/ML
25 INJECTION, SOLUTION INTRAMUSCULAR; INTRAVENOUS EVERY 5 MIN PRN
Status: DISCONTINUED | OUTPATIENT
Start: 2022-08-31 | End: 2022-08-31 | Stop reason: HOSPADM

## 2022-08-31 RX ORDER — SODIUM CHLORIDE, SODIUM LACTATE, POTASSIUM CHLORIDE, CALCIUM CHLORIDE 600; 310; 30; 20 MG/100ML; MG/100ML; MG/100ML; MG/100ML
1000 INJECTION, SOLUTION INTRAVENOUS CONTINUOUS
Status: DISCONTINUED | OUTPATIENT
Start: 2022-08-31 | End: 2022-08-31 | Stop reason: HOSPADM

## 2022-08-31 RX ORDER — DEXAMETHASONE SODIUM PHOSPHATE 10 MG/ML
INJECTION INTRAMUSCULAR; INTRAVENOUS PRN
Status: DISCONTINUED | OUTPATIENT
Start: 2022-08-31 | End: 2022-08-31 | Stop reason: SDUPTHER

## 2022-08-31 RX ORDER — SODIUM CHLORIDE, SODIUM LACTATE, POTASSIUM CHLORIDE, CALCIUM CHLORIDE 600; 310; 30; 20 MG/100ML; MG/100ML; MG/100ML; MG/100ML
INJECTION, SOLUTION INTRAVENOUS CONTINUOUS
Status: DISCONTINUED | OUTPATIENT
Start: 2022-08-31 | End: 2022-09-01 | Stop reason: HOSPADM

## 2022-08-31 RX ORDER — ROCURONIUM BROMIDE 10 MG/ML
INJECTION, SOLUTION INTRAVENOUS PRN
Status: DISCONTINUED | OUTPATIENT
Start: 2022-08-31 | End: 2022-08-31 | Stop reason: SDUPTHER

## 2022-08-31 RX ORDER — ONDANSETRON 2 MG/ML
4 INJECTION INTRAMUSCULAR; INTRAVENOUS
Status: DISCONTINUED | OUTPATIENT
Start: 2022-08-31 | End: 2022-08-31 | Stop reason: HOSPADM

## 2022-08-31 RX ORDER — OXYCODONE HYDROCHLORIDE AND ACETAMINOPHEN 5; 325 MG/1; MG/1
1 TABLET ORAL EVERY 6 HOURS PRN
Status: DISCONTINUED | OUTPATIENT
Start: 2022-08-31 | End: 2022-09-01 | Stop reason: HOSPADM

## 2022-08-31 RX ADMIN — DEXAMETHASONE SODIUM PHOSPHATE 5 MG: 10 INJECTION INTRAMUSCULAR; INTRAVENOUS at 07:29

## 2022-08-31 RX ADMIN — LIDOCAINE HYDROCHLORIDE 50 MG: 10 INJECTION, SOLUTION EPIDURAL; INFILTRATION; INTRACAUDAL; PERINEURAL at 07:15

## 2022-08-31 RX ADMIN — SUGAMMADEX 100 MG: 100 INJECTION, SOLUTION INTRAVENOUS at 08:58

## 2022-08-31 RX ADMIN — ROCURONIUM BROMIDE 70 MG: 10 INJECTION INTRAVENOUS at 07:15

## 2022-08-31 RX ADMIN — MORPHINE SULFATE 2 MG: 2 INJECTION, SOLUTION INTRAMUSCULAR; INTRAVENOUS at 10:15

## 2022-08-31 RX ADMIN — SODIUM CHLORIDE, POTASSIUM CHLORIDE, SODIUM LACTATE AND CALCIUM CHLORIDE 1000 ML: 600; 310; 30; 20 INJECTION, SOLUTION INTRAVENOUS at 06:37

## 2022-08-31 RX ADMIN — FENTANYL CITRATE 25 MCG: 50 INJECTION, SOLUTION INTRAMUSCULAR; INTRAVENOUS at 08:46

## 2022-08-31 RX ADMIN — PROPOFOL 20 MG: 10 INJECTION, EMULSION INTRAVENOUS at 08:53

## 2022-08-31 RX ADMIN — FENTANYL CITRATE 50 MCG: 50 INJECTION, SOLUTION INTRAMUSCULAR; INTRAVENOUS at 07:34

## 2022-08-31 RX ADMIN — PHENYLEPHRINE HYDROCHLORIDE 100 MCG: 10 INJECTION INTRAVENOUS at 08:04

## 2022-08-31 RX ADMIN — HEPARIN SODIUM 5000 UNITS: 5000 INJECTION INTRAVENOUS; SUBCUTANEOUS at 22:00

## 2022-08-31 RX ADMIN — ONDANSETRON 4 MG: 2 INJECTION INTRAMUSCULAR; INTRAVENOUS at 08:40

## 2022-08-31 RX ADMIN — DIPHENHYDRAMINE HYDROCHLORIDE 12.5 MG: 50 INJECTION INTRAMUSCULAR; INTRAVENOUS at 07:30

## 2022-08-31 RX ADMIN — FENTANYL CITRATE 25 MCG: 50 INJECTION, SOLUTION INTRAMUSCULAR; INTRAVENOUS at 08:44

## 2022-08-31 RX ADMIN — MIDAZOLAM 2 MG: 1 INJECTION INTRAMUSCULAR; INTRAVENOUS at 07:10

## 2022-08-31 RX ADMIN — GLYCOPYRROLATE 0.1 MG: 0.2 INJECTION INTRAMUSCULAR; INTRAVENOUS at 07:11

## 2022-08-31 RX ADMIN — FENTANYL CITRATE 25 MCG: 50 INJECTION, SOLUTION INTRAMUSCULAR; INTRAVENOUS at 08:48

## 2022-08-31 RX ADMIN — SUGAMMADEX 100 MG: 100 INJECTION, SOLUTION INTRAVENOUS at 08:51

## 2022-08-31 RX ADMIN — SODIUM CHLORIDE, POTASSIUM CHLORIDE, SODIUM LACTATE AND CALCIUM CHLORIDE: 600; 310; 30; 20 INJECTION, SOLUTION INTRAVENOUS at 08:40

## 2022-08-31 RX ADMIN — MORPHINE SULFATE 2 MG: 2 INJECTION, SOLUTION INTRAMUSCULAR; INTRAVENOUS at 09:56

## 2022-08-31 RX ADMIN — FENTANYL CITRATE 25 MCG: 50 INJECTION, SOLUTION INTRAMUSCULAR; INTRAVENOUS at 08:50

## 2022-08-31 RX ADMIN — MORPHINE SULFATE 2 MG: 2 INJECTION, SOLUTION INTRAMUSCULAR; INTRAVENOUS at 09:35

## 2022-08-31 RX ADMIN — PROPOFOL 200 MG: 10 INJECTION, EMULSION INTRAVENOUS at 07:15

## 2022-08-31 RX ADMIN — FENTANYL CITRATE 50 MCG: 50 INJECTION, SOLUTION INTRAMUSCULAR; INTRAVENOUS at 07:24

## 2022-08-31 RX ADMIN — PHENYLEPHRINE HYDROCHLORIDE 100 MCG: 10 INJECTION INTRAVENOUS at 08:12

## 2022-08-31 RX ADMIN — PROPOFOL 30 MG: 10 INJECTION, EMULSION INTRAVENOUS at 08:50

## 2022-08-31 RX ADMIN — SUGAMMADEX 100 MG: 100 INJECTION, SOLUTION INTRAVENOUS at 08:45

## 2022-08-31 RX ADMIN — OXYCODONE HYDROCHLORIDE AND ACETAMINOPHEN 1 TABLET: 5; 325 TABLET ORAL at 14:13

## 2022-08-31 RX ADMIN — MORPHINE SULFATE 2 MG: 2 INJECTION, SOLUTION INTRAMUSCULAR; INTRAVENOUS at 09:49

## 2022-08-31 RX ADMIN — PANTOPRAZOLE SODIUM 40 MG: 40 TABLET, DELAYED RELEASE ORAL at 21:10

## 2022-08-31 RX ADMIN — FENTANYL CITRATE 25 MCG: 50 INJECTION, SOLUTION INTRAMUSCULAR; INTRAVENOUS at 08:58

## 2022-08-31 RX ADMIN — HEPARIN SODIUM 5000 UNITS: 5000 INJECTION, SOLUTION INTRAVENOUS; SUBCUTANEOUS at 06:37

## 2022-08-31 RX ADMIN — PHENYLEPHRINE HYDROCHLORIDE 100 MCG: 10 INJECTION INTRAVENOUS at 08:18

## 2022-08-31 RX ADMIN — ROCURONIUM BROMIDE 10 MG: 10 INJECTION INTRAVENOUS at 08:04

## 2022-08-31 RX ADMIN — FENTANYL CITRATE 25 MCG: 50 INJECTION, SOLUTION INTRAMUSCULAR; INTRAVENOUS at 09:06

## 2022-08-31 RX ADMIN — SUGAMMADEX 200 MG: 100 INJECTION, SOLUTION INTRAVENOUS at 09:05

## 2022-08-31 RX ADMIN — PHENYLEPHRINE HYDROCHLORIDE 100 MCG: 10 INJECTION INTRAVENOUS at 07:57

## 2022-08-31 RX ADMIN — OXYCODONE HYDROCHLORIDE AND ACETAMINOPHEN 2 TABLET: 5; 325 TABLET ORAL at 22:00

## 2022-08-31 RX ADMIN — PHENYLEPHRINE HYDROCHLORIDE 150 MCG: 10 INJECTION INTRAVENOUS at 08:30

## 2022-08-31 RX ADMIN — Medication 3000 MG: at 07:25

## 2022-08-31 RX ADMIN — PHENYLEPHRINE HYDROCHLORIDE 100 MCG: 10 INJECTION INTRAVENOUS at 07:51

## 2022-08-31 ASSESSMENT — LIFESTYLE VARIABLES: SMOKING_STATUS: 0

## 2022-08-31 ASSESSMENT — PAIN SCALES - GENERAL
PAINLEVEL_OUTOF10: 7
PAINLEVEL_OUTOF10: 9
PAINLEVEL_OUTOF10: 6
PAINLEVEL_OUTOF10: 9
PAINLEVEL_OUTOF10: 9
PAINLEVEL_OUTOF10: 7

## 2022-08-31 ASSESSMENT — PAIN DESCRIPTION - LOCATION: LOCATION: ABDOMEN

## 2022-08-31 ASSESSMENT — PAIN DESCRIPTION - ORIENTATION: ORIENTATION: MID

## 2022-08-31 ASSESSMENT — PAIN - FUNCTIONAL ASSESSMENT: PAIN_FUNCTIONAL_ASSESSMENT: 0-10

## 2022-08-31 NOTE — DISCHARGE INSTRUCTIONS
Discharge Instructions for Bariatric Surgery     You had a Laparoscopic Sleeve Gastrectomy surgery to treat obesity. Recovery from this surgery can take several weeks and requires permanent lifestyle changes. What You Will Need   Protein Supplements   Bariatric Vitamins  Abdominal Binder  Incentive spirometer (a breathing device)       Home Care     It is important to keep the incisions clean and dry to promote healing. Shower with soap and rinse and dry thoroughly. If incisions are oozing, you may cover with clean gauze. Use the incentive spirometer every couple of hours. This is to make sure you are breathing deeply and keeping the air sacs within your lungs as open as possible to prevent respiratory problems. Diet    It is important to follow the diet progression very closely in order to prevent complications. When arriving home, follow the Phase 1A Liquid Diet for two weeks. Dehydration and changes in bowel habits can occur after surgery. Refer to your educational binder provided pre-op for additional information. Once you move to solids, food must be chewed well. When making food choices, you'll need to ensure adequate protein intake, while avoiding sweets and fatty foods. Eating too much or too quickly can cause vomiting or intense pain under your breastbone. Most people quickly learn how much food they can tolerate. Physical Activity    When home, we recommend that you take frequent walks, as tolerated, to prevent complications and increase your endurance. Ask your doctor when you will be able to return to work. You may need to wait 2-6 weeks. Do not drive unless you are no longer using pain medications  Do not lift anything over ten pounds for 4 weeks. Medications    Remember to avoid aspirin, aspirin-containing products, and nonsteroidal anti-inflammatory drugs (NSAIDs, such as ibuprofen, naproxen, etc.).    If you were taking these medications before the procedure, and had to stop, ask your doctor when you can resume taking them. Be sure to review your medications with your primary care provider at your follow up office visit. Lifestyle Changes    Changing your diet and level of activity are the biggest lifestyle changes associated with your success. Be aware that you may have emotional ups and downs after this surgery. Follow-up   Follow up with your primary care physician in one week. Follow up with Dr. Remi Prajapati in 1 week. If you don't already have a scheduled  appointment, please call the office at [phone]. Call Your Doctor If Any of the Following Occurs   Monitor your recovery once you leave the hospital. If any of the following occur, call your doctor:   Signs of infection, including fever above 100F    Redness, swelling, increasing pain, excessive bleeding, or any discharge from the incision site   Persistent nausea and/or vomiting   Pain that you can't control with the medications you've been given   Shortness of breath and/or chest pain   Tachycardia (racing heart sensation) unrelieved by rest  Pain, redness and/or swelling in your feet or legs    Sudden onset of severe left shoulder pain or severe abdominal pain  Any symptoms that are causing you concern   In case of an emergency, call 911 immediately.

## 2022-08-31 NOTE — INTERVAL H&P NOTE
Pt Name: Magnus Galeana  MRN: [id number]  [place]: 1975  Date of evaluation: 8/31/2022    I have reviewed the patient's history and physical examination completed in pre-admission testing on 8/16/22    No changes to history or on examination today, unless noted below. Previous red raised lesion on her abdomen mostly gone, skin is dry, intact, without erythema or drainage. She reports surgeon without concern at last visit. No other change.      Gregorio Villareal, LAURA - CNP  8/31/22  6:32 AM

## 2022-08-31 NOTE — OP NOTE
Operative Note      Patient: Kasandra Simons  YOB: 1975  MRN: [id number]    Date of Procedure: 8/31/2022    Pre-Op Diagnosis: MORBID OBESITY, 71, HYPERTENSION, PREDIABETES    Post-Op Diagnosis: Same       Procedure(s):  XI ROBOTIC LAPAROSCOPIC GASTRECTOMY SLEEVE, EGD    Surgeon(s):  Neha Kelsey DO    Assistant:   First Assistant: Cesar Crouch    Anesthesia: General    Estimated Blood Loss (mL): Minimal    Complications: None    Specimens:   ID Type Source Tests Collected by Time Destination   A : Gastric Remnant Tissue Stomach SURGICAL PATHOLOGY Neha Kelsey DO 8/31/2022 8350        Implants:  * No implants in log *      Drains: * No LDAs found *    Findings:   Hemostatic staple lines  Negative leak test  Counts reported to me as correct    Detailed Description of Procedure:   Operative narrative: The patient was taken to the operative suite and administered anesthesia by the anesthetic team.  Next we prepped and draped in normal sterile fashion. Incision was then made at Damon's point for a 12 mm port. The abdomen was surveyed and we entered the abdomen using a optical Visiport trocar of 12 mm in size. This was accomplished at Damon's point. Pneumoperitoneum was then established without complication, the underlying area surveyed. We then placed 4 other ports in standard fashion under direct laparoscopic visualization. Attention was then turned towards placement of the [facility], located within [facility] liver retractor. Small incision made just inferior to the xiphoid process for the liver retractor. The liver retractor was then placed under direct laparoscopic visualization in order to facilitate visualization of the stomach and hiatus. No visible hiatal hernia. We then turned our attention towards formation of the gastric sleeve.   Starting at 6 cm proximal to the pylorus bite dissection was undertaken of the greater curvature and the short gastric vessels taken down using the Vessel Sealer. We mobilized this from our 6 cm starting point to the left kailash. Satisfactory mobilization was undertaken and there were noted to be no adhesions posteriorly on the stomach between the stomach and the pancreas or retroperitoneum. I then had anesthesia pass a 36 Bel[place]ian bougie under direct visualization. This was visualized at the tip of the bougie as well as along the lesser curve. I then placed my first staple load a green 60 mm load to start sleeve formation. The bougie was noted be along the lesser curve and was freely mobile before the first green load was fired. A second green load 60 mm stapler was then placed again using the bougie for assistance and guidance along the lesser curvature. I then used blue 60 mm stapler loads to complete sleeve formation. The last staple load was noted to fire in proper orientation to prevent staple line formation along the esophageal fibers. Satisfactory sleeve formation was noted at that time and the staple line was hemostatic. I then had the bougie removed. I then reinforced my staple line with 3-0 Vicryl sutures along the length of the staple line at the staple line confluences. At that time leak test was then started. The patient was placed in a Trendelenburg position and we irrigated the upper abdomen with saline. I then passed an Olympus endoscope into the oropharynx down the esophagus under direct visualization. The scope was passed down through the esophagus down into the lumen of the new gastric sleeve the scope passed easily through the incisura and into the antrum. The scope was then passed into the duodenum through the pylorus. The pylorus and duodenum appeared intact and the scope was slowly withdrawn while visualizing the staple line throughout the course of the staple line. I then clamped distally by applying pressure near the pyloric region to allow for distention of the gastric sleeve.   We then further irrigated and there was no evidence of leak using a bubble test.  The staple line on the inner lumen of the stomach appeared intact and hemostatic throughout. The scope was slowly withdrawn to the GE junction and no evidence of stricture noted. The scope was then withdrawn from the esophagus and no other lesion noted. Attention was then turned back towards the abdomen the abdomen was aspirated of the prior placed saline for leak test.  I then placed an anchoring suture using 3-0 Vicryl suture to prevent torsion of the sleeve and migration of the sleeve superiorly. Specimen was withdrawn from the left upper quadrant incision. We then irrigated this incision thoroughly with saline. Next counts reported to me as correct. The 12 mm port sites were then closed using a suture passer to pass 0 Vicryl suture under direct laparoscopic visualization for proper fascial reapproximation at each port site. All ports were then removed. Pneumoperitoneum was released in entirety. The skin was then closed using 4-0 Vicryl subcuticular stitches in interrupted fashion. The region was cleaned with sterile normal saline followed by placement of TinCoBen and Steri-Strips and sterile bandage. The patient tolerated the procedure well and was transferred to PACU. The patient's family was updated postoperatively.        Electronically signed by Christiano Dubois DO on 8/31/2022 at 8:58 AM

## 2022-08-31 NOTE — ANESTHESIA PRE PROCEDURE
Department of Anesthesiology  Preprocedure Note       Name:  Jason Boswell   Age:  50 y.o.  :  1975                                          MRN:  [id number]         Date:  2022      Surgeon: Roman Maxwell):  Talia Xiao DO    Procedure: Procedure(s):  XI ROBOTIC LAPAROSCOPIC GASTRECTOMY SLEEVE, LIVER BIOPSY, EGD - GI SCHEDULED    Medications prior to admission:   Prior to Admission medications    Medication Sig Start Date End Date Taking?  Authorizing Provider   cetirizine (ZYRTEC) 10 MG tablet TAKE 1 TABLET BY MOUTH EVERY DAY 8/15/22   Kari Jo DO   vitamin D (ERGOCALCIFEROL) 1.25 MG (05996 UT) CAPS capsule Take 1 capsule by mouth once a week for 8 doses  Patient taking differently: Take 50,000 Units by mouth once a week On 22  LAURA Srivastava - CNP   famotidine (PEPCID) 40 MG tablet Take 1 tablet by mouth 2 times daily 22   Kari Jo DO   lisinopril (PRINIVIL;ZESTRIL) 20 MG tablet TAKE 1 TABLET BY MOUTH ONE TIME A DAY 22   [facility],    montelukast (SINGULAIR) 10 MG tablet Take 1 tablet by mouth daily  Patient taking differently: Take 10 mg by mouth nightly 22   Kari Jo DO   furosemide (LASIX) 40 MG tablet Take 1 tablet by mouth 2 times daily as needed (edema)  Patient taking differently: Take 20 mg by mouth daily 22   Kari Jo DO   pioglitazone (ACTOS) 15 MG tablet TAKE 1 TABLET BY MOUTH ONE TIME A DAY 22   [facility],    acetaminophen (TYLENOL) 500 MG tablet Take 2 tablets by mouth 3 times daily as needed for Pain 22   Kari Jo DO   albuterol sulfate  (90 Base) MCG/ACT inhaler Inhale 2 puffs into the lungs 4 times daily as needed for Wheezing  Patient not taking: Reported on 2022 7/15/19   aKri Jo DO   nystatin (MYCOSTATIN) 029265 UNIT/GM powder APPLY EXTERNALLY THREE TIMES A DAY   Patient not taking: Reported on 2022   [facility] DO Deanne       Current medications:    No current facility-administered medications for this visit. No current outpatient medications on file. Facility-Administered Medications Ordered in Other Visits   Medication Dose Route Frequency Provider Last Rate Last Admin    lactated ringers infusion 1,000 mL  1,000 mL IntraVENous Continuous Lupillo Gautam MD 50 mL/hr at 08/31/22 0637 1,000 mL at 08/31/22 0637    ceFAZolin (ANCEF) 3000 mg in dextrose 5 % 100 mL IVPB  3,000 mg IntraVENous Once Ty Cortez DO           Allergies:     Allergies   Allergen Reactions    Dicloxacillin      Not sure what reaction    Metformin And Related Diarrhea       Problem List:    Patient Active Problem List   Diagnosis Code    Essential hypertension, benign I10    Allergic rhinitis J30.9    Hiatal hernia with GERD K21.9, K44.9    Insulin resistance E88.81    Neural foraminal stenosis of cervical spine M48.02    Morbid obesity ([facility] [address] E66.01    Stress incontinence N39.3    Urge incontinence of urine N39.41    Mild intermittent asthma without complication Y48.53    Prediabetes R73.03    Lymphedema I89.0    Arthritis M19.90       Past Medical History:        Diagnosis Date    Allergic rhinitis     Arthritis     Asthma     Caffeine use     1 coffee, 1-3 tea daily    Class 3 obesity with serious comorbidity and body mass index (BMI) of 60.0 to 69.9 in adult 01/18/2018    GERD (gastroesophageal reflux disease)     Heartburn     History of echocardiogram 02/2022    Hypertension     Insulin resistance 09/09/2014    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 Debbie Serra       Past Surgical History:        Procedure Laterality Date    CARDIOVASCULAR STRESS TEST  2017    ENDOMETRIAL ABLATION      FOOT SURGERY Bilateral     ingrown toenail big toe    TONSILLECTOMY      UPPER GASTROINTESTINAL ENDOSCOPY N/A 03/25/2022    EGD BIOPSY [facility]derr[facility] DO at [facility]       Social History:    Social History     Tobacco Use    Smoking status: Never    Smokeless tobacco: Never   Substance Use Topics    Alcohol use: No                                Counseling given: Not Answered      Vital Signs (Current): There were no vitals filed for this visit.                                            BP Readings from Last 3 Encounters:   08/31/22 (!) 109/51   08/23/22 110/60   08/16/22 (!) 159/91       NPO Status:  gatorade 5:30 AM                                                                               BMI:   Wt Readings from Last 3 Encounters:   08/31/22 (!) 378 lb (171.5 kg)   08/23/22 (!) 360 lb (163.3 kg)   08/16/22 (!) 378 lb (171.5 kg)     There is no height or weight on file to calculate BMI.    CBC:   Lab Results   Component Value Date/Time    WBC 9.0 08/16/2022 12:18 PM    RBC 4.50 08/16/2022 12:18 PM    HGB 12.3 08/16/2022 12:18 PM    HCT 39.3 08/16/2022 12:18 PM    MCV 87.3 08/16/2022 12:18 PM    RDW 14.0 08/16/2022 12:18 PM     08/16/2022 12:18 PM       CMP:   Lab Results   Component Value Date/Time     08/16/2022 12:18 PM    K 4.1 08/16/2022 12:18 PM     08/16/2022 12:18 PM    CO2 24 08/16/2022 12:18 PM    BUN 19 08/16/2022 12:18 PM    CREATININE 0.74 08/16/2022 12:18 PM    GFRAA >60 08/16/2022 12:18 PM    LABGLOM >60 08/16/2022 12:18 PM    GLUCOSE 95 08/16/2022 12:18 PM    PROT 6.6 12/30/2021 04:55 AM    CALCIUM 9.5 08/16/2022 12:18 PM    BILITOT 0.30 12/30/2021 04:55 AM    ALKPHOS 63 12/30/2021 04:55 AM    AST 12 12/30/2021 04:55 AM    ALT 15 12/30/2021 04:55 AM       POC Tests:   Recent Labs     08/31/22  0634   POCGLU 87   POCK 3.9       Coags:   Lab Results   Component Value Date/Time    PROTIME 10.7 08/16/2022 12:18 PM    INR 1.0 08/16/2022 12:18 PM    APTT 26.4 08/16/2022 12:18 PM       HCG (If Applicable): No results found for: PREGTESTUR, PREGSERUM, HCG, HCGQUANT     ABGs: No results found for: PHART, PO2ART, PPR8ZKZ, MVO2PSG, BEART, T2QRLJFS     Type & Screen (If Applicable):  No results found for: LABABO, LABRH    Drug/Infectious Status (If Applicable):  No results found for: HIV, HEPCAB    COVID-19 Screening (If Applicable): No results found for: COVID19        Anesthesia Evaluation  Patient summary reviewed no history of anesthetic complications:   Airway: Mallampati: III     Neck ROM: full     Dental:          Pulmonary:   (+) sleep apnea: on CPAP,  decreased breath sounds asthma:     (-) recent URI and not a current smoker                           Cardiovascular:  Exercise tolerance: poor (<4 METS),   (+) hypertension:, GUZMÁN:,         Rhythm: regular  Rate: normal                    Neuro/Psych:   (+) neuromuscular disease:,    (-) seizures and CVA            ROS comment: Neural foraminal stenosis of cervical spine GI/Hepatic/Renal:   (+) hiatal hernia, GERD:, morbid obesity          Endo/Other:    (+) : arthritis:., .    (-) diabetes mellitus               Abdominal:             Vascular: negative vascular ROS. Other Findings:             Anesthesia Plan      general     ASA 4       Induction: intravenous.                             Andre Stacy MD   8/31/2022

## 2022-08-31 NOTE — ANESTHESIA POSTPROCEDURE EVALUATION
Department of Anesthesiology  Postprocedure Note    Patient: Mick Cruz  MRN: [id number]  YOB: 1975  Date of evaluation: 8/31/2022      Procedure Summary     Date: 08/31/22 Room / Location: [address]    Anesthesia Start: [facility] Anesthesia Stop: 2369    Procedure: XI ROBOTIC LAPAROSCOPIC GASTRECTOMY SLEEVE, EGD Diagnosis:       Morbid obesity ([address]      Hypertension, unspecified type      Prediabetes      (MORBID OBESITY, HYPERTENSION, PREDIABETES)    Surgeons: Marisol Spencer DO Responsible Provider: Mary Kay Arceo MD    Anesthesia Type: general ASA Status: 4          Anesthesia Type: No value filed.     Nils Phase I: Nils Score: 9    Nils Phase II:        Anesthesia Post Evaluation    Patient location during evaluation: PACU  Patient participation: complete - patient participated  Level of consciousness: sleepy but conscious  Pain score: 7 (sleeping)  Nausea & Vomiting: no nausea  Cardiovascular status: hemodynamically stable  Respiratory status: room air

## 2022-09-01 VITALS
TEMPERATURE: 97.8 F | OXYGEN SATURATION: 95 % | SYSTOLIC BLOOD PRESSURE: 127 MMHG | HEART RATE: 77 BPM | HEIGHT: 61 IN | RESPIRATION RATE: 18 BRPM | BODY MASS INDEX: 55.32 KG/M2 | WEIGHT: 293 LBS | DIASTOLIC BLOOD PRESSURE: 78 MMHG

## 2022-09-01 LAB
ANION GAP SERPL CALCULATED.3IONS-SCNC: 16 MMOL/L (ref 9–17)
BUN BLDV-MCNC: 15 MG/DL (ref 6–20)
CALCIUM SERPL-MCNC: 8.4 MG/DL (ref 8.6–10.4)
CHLORIDE BLD-SCNC: 101 MMOL/L (ref 98–107)
CO2: 19 MMOL/L (ref 20–31)
CREAT SERPL-MCNC: 0.74 MG/DL (ref 0.5–0.9)
GFR AFRICAN AMERICAN: >60 ML/MIN
GFR NON-AFRICAN AMERICAN: >60 ML/MIN
GFR SERPL CREATININE-BSD FRML MDRD: ABNORMAL ML/MIN/{1.73_M2}
GLUCOSE BLD-MCNC: 94 MG/DL (ref 70–99)
HCT VFR BLD CALC: 35 % (ref 36.3–47.1)
HEMOGLOBIN: 12 G/DL (ref 11.9–15.1)
MCH RBC QN AUTO: 28.5 PG (ref 25.2–33.5)
MCHC RBC AUTO-ENTMCNC: 34.3 G/DL (ref 28.4–34.8)
MCV RBC AUTO: 83.1 FL (ref 82.6–102.9)
NRBC AUTOMATED: 0 PER 100 WBC
PDW BLD-RTO: 13.9 % (ref 11.8–14.4)
PLATELET # BLD: 197 K/UL (ref 138–453)
PMV BLD AUTO: 10.1 FL (ref 8.1–13.5)
POTASSIUM SERPL-SCNC: 4.5 MMOL/L (ref 3.7–5.3)
RBC # BLD: 4.21 M/UL (ref 3.95–5.11)
SODIUM BLD-SCNC: 136 MMOL/L (ref 135–144)
SURGICAL PATHOLOGY REPORT: NORMAL
WBC # BLD: 10.2 K/UL (ref 3.5–11.3)

## 2022-09-01 PROCEDURE — 85027 COMPLETE CBC AUTOMATED: CPT

## 2022-09-01 PROCEDURE — 96372 THER/PROPH/DIAG INJ SC/IM: CPT

## 2022-09-01 PROCEDURE — G0378 HOSPITAL OBSERVATION PER HR: HCPCS

## 2022-09-01 PROCEDURE — 6370000000 HC RX 637 (ALT 250 FOR IP): Performed by: SURGERY

## 2022-09-01 PROCEDURE — 2700000000 HC OXYGEN THERAPY PER DAY

## 2022-09-01 PROCEDURE — 6360000002 HC RX W HCPCS: Performed by: SURGERY

## 2022-09-01 PROCEDURE — 94761 N-INVAS EAR/PLS OXIMETRY MLT: CPT

## 2022-09-01 PROCEDURE — 80048 BASIC METABOLIC PNL TOTAL CA: CPT

## 2022-09-01 PROCEDURE — 36415 COLL VENOUS BLD VENIPUNCTURE: CPT

## 2022-09-01 PROCEDURE — 6370000000 HC RX 637 (ALT 250 FOR IP): Performed by: STUDENT IN AN ORGANIZED HEALTH CARE EDUCATION/TRAINING PROGRAM

## 2022-09-01 RX ORDER — ACETAMINOPHEN 325 MG/1
650 TABLET ORAL EVERY 4 HOURS PRN
Status: DISCONTINUED | OUTPATIENT
Start: 2022-09-01 | End: 2022-09-01 | Stop reason: HOSPADM

## 2022-09-01 RX ADMIN — PANTOPRAZOLE SODIUM 40 MG: 40 TABLET, DELAYED RELEASE ORAL at 08:59

## 2022-09-01 RX ADMIN — HEPARIN SODIUM 5000 UNITS: 5000 INJECTION INTRAVENOUS; SUBCUTANEOUS at 06:14

## 2022-09-01 RX ADMIN — ACETAMINOPHEN 650 MG: 325 TABLET ORAL at 08:25

## 2022-09-01 ASSESSMENT — PAIN DESCRIPTION - LOCATION: LOCATION: ABDOMEN

## 2022-09-01 ASSESSMENT — PAIN DESCRIPTION - FREQUENCY: FREQUENCY: CONTINUOUS

## 2022-09-01 ASSESSMENT — PAIN DESCRIPTION - ORIENTATION: ORIENTATION: MID

## 2022-09-01 ASSESSMENT — PAIN DESCRIPTION - ONSET: ONSET: ON-GOING

## 2022-09-01 ASSESSMENT — PAIN SCALES - GENERAL: PAINLEVEL_OUTOF10: 4

## 2022-09-01 NOTE — PROGRESS NOTES
CLINICAL PHARMACY NOTE: MEDS TO BEDS    Total # of Prescriptions Filled: 4   The following medications were delivered to the patient:  Cyclobenzaprine 10mg  Percocet 5-325mg  Promethazine 25mg  Enoxaparin 40mg/0.4ml    Additional Documentation: delivered to patient in room 234 9/1 at 10:33am. Co-pay $9.10 clo[employer]

## 2022-09-01 NOTE — PROGRESS NOTES
Discharge instructions reviewed with patient and daughter. All questions answered. Patient given IS and hibiclens to take home. Patient sent home with all belongings. Patient taken to waiting transportation via wheelchair x 1 staff.

## 2022-09-01 NOTE — PROGRESS NOTES
Bariatric surgery:  Daily Progress Note          s/p robotic assisted laparoscopic sleeve gastrectomy          PATIENT NAME: Magnus Galeana     TODAY'S DATE: 9/1/2022, 7:37 AM  CC: Postoperative pain    SUBJECTIVE:     Pt seen and examined at bedside this morning, no acute events overnight. Pain is controlled. Denies nausea or emesis. Tolerating bariatric clears. Voiding appropriately    OBJECTIVE:   VITALS:  BP (!) 115/57   Pulse 79   Temp 97.5 °F (36.4 °C) (Oral)   Resp 19   Ht 5' 1\" (1.549 m)   Wt (!) 378 lb (171.5 kg)   SpO2 94%   BMI 71.42 kg/m²      INTAKE/OUTPUT:      Intake/Output Summary (Last 24 hours) at 9/1/2022 0737  Last data filed at 9/1/2022 0000  Gross per 24 hour   Intake 1882 ml   Output 2005 ml   Net -123 ml       PHYSICAL EXAM:  General Appearance: awake, alert, oriented, in no acute distress  HEENT:  Normocephalic, atraumatic, mucus membranes moist   Heart: Heart regular rate and rhythm  Lungs: Normal expansion. No wheezing. Abdomen: Soft, nondistended, appropriately tender, incisions C/D/I  Extremities: No cyanosis, pitting edema, rashes noted. Skin: Skin color, texture, turgor normal. No rashes or lesions.     Data:  CBC with Differential:    Lab Results   Component Value Date/Time    WBC 10.2 09/01/2022 06:16 AM    RBC 4.21 09/01/2022 06:16 AM    HGB 12.0 09/01/2022 06:16 AM    HCT 35.0 09/01/2022 06:16 AM     09/01/2022 06:16 AM    MCV 83.1 09/01/2022 06:16 AM    MCH 28.5 09/01/2022 06:16 AM    MCHC 34.3 09/01/2022 06:16 AM    RDW 13.9 09/01/2022 06:16 AM    LYMPHOPCT 28 12/30/2021 04:55 AM    MONOPCT 11 12/30/2021 04:55 AM    BASOPCT 0 12/30/2021 04:55 AM    MONOSABS 0.80 12/30/2021 04:55 AM    LYMPHSABS 2.00 12/30/2021 04:55 AM    EOSABS 0.20 12/30/2021 04:55 AM    BASOSABS 0.00 12/30/2021 04:55 AM    DIFFTYPE NOT REPORTED 12/30/2021 04:55 AM     BMP:    Lab Results   Component Value Date/Time     09/01/2022 06:16 AM    K 4.5 09/01/2022 06:16 AM     09/01/2022 06:16 AM    CO2 19 09/01/2022 06:16 AM    BUN 15 09/01/2022 06:16 AM    LABALBU 4.0 12/30/2021 04:55 AM    CREATININE 0.74 09/01/2022 06:16 AM    CALCIUM 8.4 09/01/2022 06:16 AM    GFRAA >60 09/01/2022 06:16 AM    LABGLOM >60 09/01/2022 06:16 AM    GLUCOSE 94 09/01/2022 06:16 AM       Radiology Review:   No results found.       ASSESSMENT:  Active Hospital Problems    Diagnosis Date Noted    S/P laparoscopic sleeve gastrectomy [Z98.84] 08/31/2022     Priority: Medium       50 y.o. female status post robotic assisted laparoscopic sleeve gastrectomy 8/31/2022    Plan:  Diet: Bariatric clear liquids  Analgesia: Continue current pain regiment, PO>IV  DVT prophylaxis: SQH  Activity:  Continue to encourage ambulation/activity  Continue to encourage incentive spirometry  DC planning: Likely home later today    Electronically signed by Alycia Lanes, DO  on 9/1/2022 at 7:37 AM

## 2022-09-02 ENCOUNTER — CARE COORDINATION (OUTPATIENT)
Dept: CARE COORDINATION | Age: 47
End: 2022-09-02

## 2022-09-02 ENCOUNTER — TELEPHONE (OUTPATIENT)
Dept: BARIATRICS/WEIGHT MGMT | Age: 47
End: 2022-09-02

## 2022-09-02 DIAGNOSIS — Z98.84 S/P LAPAROSCOPIC SLEEVE GASTRECTOMY: Primary | ICD-10-CM

## 2022-09-02 NOTE — CARE COORDINATION
[facility] Transitions Initial Follow Up Call    Call within 2 business days of discharge: Yes    Patient: Thierno Delgado Patient : 1975   MRN: [id number]  Reason for Admission: Gastric Sleeve  Discharge Date: 22 RARS: No data recorded    Last Discharge [facility]       Date Complaint Diagnosis Description Type Department Provider    22  Postoperative pain . .. Admission (Discharged) Tushari Erzs[address]bet [address] DO             Spoke with: Patient    Facility: [facility] with patient, states she is doing well and following all discharge instructions. She denies any fever, chills, chest pain, SOB, change in leg edema, dizziness. She does repot that she has not had a BM in 3 days. She was instructed to use MOM on day 3 per .  She does have some on hand. Med rec completed with patient and she has all meds on hand as ordered. She has only used her Percocet once at bedtime and has been using Tylenol otherwise for the pain. She states the pain is tolerable. She has been using her Incentive Spirometer and getting up and ambulating every hour. Her incisions are clean and dry without signs of infection. She has her Post Op appt scheduled with Dr Augusto Carcamo on 22. She has her daughter at home to assist her and denies any needs at this time. Transitions of Care Initial Call    Was this an external facility discharge? No Discharge Facility: [facility]    Challenges to be reviewed by the provider   Additional needs identified to be addressed with provider: No  none             Method of communication with provider : none      Advance Care Planning:   Does patient have an Advance Directive: reviewed and current, reviewed and needs to be updated, not on file; education provided, not on file, patient declined education, decision maker updated, and referral to internal ACP facilitator. Was this a readmission?  No  Patient stated reason for admission: Gastric Sleeve  Patients top risk factors for readmission: medical condition-Post Op    Care Transition Nurse (CTN) contacted the patient by telephone to perform post hospital discharge assessment. Verified name and  with patient as identifiers. Provided introduction to self, and explanation of the CTN role. CTN reviewed discharge instructions, medical action plan and red flags with patient who verbalized understanding. Patient given an opportunity to ask questions and does not have any further questions or concerns at this time. Were discharge instructions available to patient? Yes. Reviewed appropriate site of care based on symptoms and resources available to patient including: PCP  Specialist  When to call 911. The patient agrees to contact the PCP office for questions related to their healthcare. Medication reconciliation was performed with patient, who verbalizes understanding of administration of home medications. Advised obtaining a 90-day supply of all daily and as-needed medications. CTN provided contact information. Plan for follow-up call in 5-7 days based on severity of symptoms and risk factors.   Plan for next call:  - sxs            - discuss appt    Ron Dominguez LPN  [address] [place] [address] Transition Nurse  [phone]      Care Transitions 24 Hour Call    Schedule Follow Up Appointment with PCP: Completed  Do you have a copy of your discharge instructions?: Yes  Do you have all of your prescriptions and are they filled?: Yes  Have you been contacted by a [id number] [employer] Pharmacist?: No  Have you scheduled your follow up appointment?: Yes  How are you going to get to your appointment?: Car - family or friend to transport  Do you feel like you have everything you need to keep you well at home?: Yes  Care Transitions Interventions   Meds to Bed: Completed              Follow Up  Future Appointments   Date Time Provider Philip Betancourt   2022  9:15 AM SCHEDULE, [facility] BARIATRIC DIETICIAN bariatric colin [facility]   10/12/2022 10:30 AM SCHEDULE, [facility] BARIATRIC DIETICIAN bariatric colin [facility]   1/16/2023 10:30 AM DO PAUL Quinones [facility] None       Savana Sandoval, RUBY

## 2022-09-02 NOTE — TELEPHONE ENCOUNTER
Post-op outreach call made to pt. Pt reports frequent ambulation around home. Pt wearing abd binder. No complaints of SOB or tachycardia. Laparoscopic incisional sites without problems. Pt has not had a BM since surgery. Not Passing flatus. Agrees to use Milk of Magnesia or Miriaax and monitor for BM. Pt reports urine output of at least 4 times in a 24 hour period. Intake is described as 64 oz, will start protein shake. Rates pain as 2 on a 0-10 scale. Pt using pain medication as directed. Allowed pt the opportunity to ask questions. Reminded patient to reference the patient education materials as needed. Reminded pt that they may phone the office or the \"after hours telephone number\"  that is listed in the patient education binder as needed. Pt verbalized understanding. Pt without concerns at this time    Post op office appt date and time reviewed with pt.     Has Lovenox is going well

## 2022-09-08 ENCOUNTER — OFFICE VISIT (OUTPATIENT)
Dept: BARIATRICS/WEIGHT MGMT | Age: 47
End: 2022-09-08

## 2022-09-08 VITALS
DIASTOLIC BLOOD PRESSURE: 74 MMHG | BODY MASS INDEX: 55.32 KG/M2 | WEIGHT: 293 LBS | SYSTOLIC BLOOD PRESSURE: 114 MMHG | HEIGHT: 61 IN | HEART RATE: 82 BPM | TEMPERATURE: 98 F

## 2022-09-08 DIAGNOSIS — Z98.84 S/P LAPAROSCOPIC SLEEVE GASTRECTOMY: Primary | ICD-10-CM

## 2022-09-08 PROCEDURE — 99024 POSTOP FOLLOW-UP VISIT: CPT | Performed by: SURGERY

## 2022-09-08 NOTE — PROGRESS NOTES
Medical Nutrition Therapy  Metabolic and Bariatric surgery  1 week Post operative follow up         Pt reports: Tolerating protein shakes, water, and vitamins. Vitals:   Vitals:    09/08/22 0918   BP: 114/74   Site: Right Upper Arm   Position: Sitting   Cuff Size: Large Adult   Pulse: 82   Temp: 98 °F (36.7 °C)   TempSrc: Oral   Weight: (!) 349 lb (158.3 kg)   Height: 5' 1\" (1.549 m)      Body mass index is 65.94 kg/m². Labs reviewed:              Nutrition Assessment:   PES: Inadequate food and beverage intake r/t WLS as evidenced by loss of excess body weight lost 29 lbs over 1 week.       Goals   60-80gm of protein  48-64oz of fluid       [x] met     []  Not met        Plan:  Pt questions answered re diet advancement;  F/u 5 weeks post-op      Kaleb Sams, MS, RD, LD

## 2022-09-09 ENCOUNTER — CARE COORDINATION (OUTPATIENT)
Dept: CASE MANAGEMENT | Age: 47
End: 2022-09-09

## 2022-09-09 NOTE — CARE COORDINATION
[facility] Transitions Follow Up Call    2022    Patient: Rice Dance  Patient : 1975   MRN: [id number]  Reason for Admission: Gastric Sleeve  Discharge Date: 22 RARS: No data recorded       Spoke with: Called to speak with patient for update with transition of care. Left HIPPA compliant voice message with contact information [phone] for a call  Back with an update. Care Transitions Subsequent and Final Call    Subsequent and Final Calls  Care Transitions Interventions  Other Interventions:              Follow Up  Future Appointments   Date Time Provider Philip Betancourt   10/12/2022 10:30 AM SCHEDULE, [facility] BARIATRIC DIETICIAN bariatric colin [facility]   2023 10:30 AM DO PAUL Blair SylvLkFP None       Omero Newman LPN

## 2022-09-14 ENCOUNTER — CARE COORDINATION (OUTPATIENT)
Dept: CARE COORDINATION | Age: 47
End: 2022-09-14

## 2022-09-14 NOTE — CARE COORDINATION
Care Transitions Subsequent Outreach Attempt #2 final      Attempted to reach patient for transitions of care follow up. Unable to reach patient. HIPAA compliant message left on  requesting a return call. Patient: Eduardo Escobar Patient : 1975 MRN: [id number]    Last Discharge [facility]       Date Complaint Diagnosis Description Type Department Provider    22  Postoperative pain . .. Admission (Discharged) [address] DO              Was this an external facility discharge?  No Discharge Facility: [facility]    Noted following upcoming appointments from discharge chart review:   [facility] follow up appointment(s):   Future Appointments   Date Time Provider Philip Betancourt   10/12/2022 10:30 AM SCHEDULE, [facility]   2023 10:30 AM DO Professor Drew Juan [address] [place] [address] Health/ Care Transition Nurse  [phone]

## 2022-09-16 NOTE — PROGRESS NOTES
[address] [facility] INVASIVE BARIATRIC SURG  [address]  [address]  [facility] [address]  Dept: [phone]    9/8/2022    CC: Post Bariatric Surgery    History:  50year old female 1 week post bariatric surgery. Overall doing well. Tolerating diet. No nausea or vomiting. Pain is controlled. Had a BM. No new complaints. No chest pain, shortness of breath, fevers/chills.       Review of Systems:  General  Negative for: [] Weight Change   [x] Fatigue   [x] Fevers & Chills    [] Appetite change [] Other:    Positive for: [x] Weight Change   [] Fatigue   [] Fevers & Chills    [] Appetite change [] Other:   Cardiac  Negative for: [x] Chest Pain   [x] Difficulty Breathing   [] Leg Cramps [x] Edema of Lower Extremeties    [] Left   [] Right      Positive for: [] Chest Pain   [] Difficulty Breathing   [] Leg Cramps [] Edema of Lower Extremeties    [] Left   [] Right   Pulmonary  Negative for: [x] Shortness of Breath [] Wheeze [x] Cough  [] Calf Pain     Positive for: [] Shortness of Breath [] Wheeze [] Cough  [] Calf Pain   Gastro-Intestinal Negative for: [] Heartburn   [] Reflux   [] Dysphagia   [] Melena   [] BRBPR  [x] Vomiting   [x] Abdominal Pain   [] Diarrhea     [] Constipation  [] Other:     Positive for: [] Heartburn   [] Reflux   [] Dysphagia   [] Melena   [] BRBPR  [] Vomiting   [] Abdominal Pain   [] Diarrhea     [] Constipation  [] Other:    Muskuloskeletal Negative for: [] Joint pain   [] Back pain   [] Knee Pain   [x] Muscle weakness [x] Hernia   [] Edema [] Other:     Positive for: [] Joint pain   [] Back pain   [] Knee Pain   [] Muscle weakness [] Hernia   [] Edema [] Other:    Neurologic Negative for: [x] Syncope   [x] Insomnia   [] Being treated for depression  [] Other:     Positive for: [] Syncope   [] Insomnia   [] Being treated for depression  [] Other:    Skin Negative for: [] Wound:   [] Open   [] Draining   [] Incisional     [x] Rash   [x] Hair Loss  [] Other:     Positive for: [] Wound:   [] Open   [] Draining    [] Incisional  [] Rash   [] Hair Loss  [] Other:        Physical Exam:  /74 (Site: Right Upper Arm, Position: Sitting, Cuff Size: Large Adult)   Pulse 82   Temp 98 °F (36.7 °C) (Oral)   Ht 5' 1\" (1.549 m)   Wt (!) 349 lb (158.3 kg)   BMI 65.94 kg/m²       Constitutional:  Vital signs are normal. The patient appears well-developed and well-nourished. HEENT:   Head: Normocephalic. Atraumatic  Eyes: pupils are equal and reactive. No scleral icterus is present. Neck: No mass and no thyromegaly present. Cardiovascular: Normal rate, regular rhythm, S1 normal and S2 normal.  Radial pulses present   Pulmonary/Chest: Effort normal and breath sounds normal. No retractions  Abdominal: Soft. Normal appearance. There is no organomegaly. No tenderness. There is no rigidity, no rebound, no guarding and no Vela's sign. Musculoskeletal:        Right lower leg: Normal. No tenderness and no edema. Left lower leg: Normal. No tenderness and no edema. Neurological: The patient is alert and oriented. Moving all 4 extremities, sensation grossly intact bilateral  Skin: Skin is warm, dry and intact. Incisions CDI  Psychiatric: The patient has a normal mood and affect.  Speech is normal and behavior is normal. Judgment and thought content normal. Cognition and memory are normal.     Assessment:  1 week post sleeve  Progressing well    Plan:  Full liquids  Vitamin replacement discussed  Lovenox  Dietician visit  Doing well  Pathology reviewed with patient

## 2022-10-12 ENCOUNTER — OFFICE VISIT (OUTPATIENT)
Dept: BARIATRICS/WEIGHT MGMT | Age: 47
End: 2022-10-12

## 2022-10-12 VITALS
DIASTOLIC BLOOD PRESSURE: 62 MMHG | SYSTOLIC BLOOD PRESSURE: 100 MMHG | BODY MASS INDEX: 53.92 KG/M2 | HEART RATE: 71 BPM | WEIGHT: 293 LBS | HEIGHT: 62 IN

## 2022-10-12 DIAGNOSIS — I89.0 LYMPHEDEMA: ICD-10-CM

## 2022-10-12 DIAGNOSIS — E66.01 MORBID OBESITY (HCC): ICD-10-CM

## 2022-10-12 DIAGNOSIS — K44.9 HIATAL HERNIA WITH GERD: ICD-10-CM

## 2022-10-12 DIAGNOSIS — J45.20 MILD INTERMITTENT ASTHMA WITHOUT COMPLICATION: ICD-10-CM

## 2022-10-12 DIAGNOSIS — R73.03 PREDIABETES: ICD-10-CM

## 2022-10-12 DIAGNOSIS — Z98.84 S/P LAPAROSCOPIC SLEEVE GASTRECTOMY: ICD-10-CM

## 2022-10-12 DIAGNOSIS — K21.9 HIATAL HERNIA WITH GERD: ICD-10-CM

## 2022-10-12 DIAGNOSIS — I10 ESSENTIAL HYPERTENSION, BENIGN: Primary | ICD-10-CM

## 2022-10-12 DIAGNOSIS — M19.90 ARTHRITIS: ICD-10-CM

## 2022-10-12 PROCEDURE — 99024 POSTOP FOLLOW-UP VISIT: CPT | Performed by: NURSE PRACTITIONER

## 2022-10-12 NOTE — PROGRESS NOTES
Medical Nutrition Therapy  Metabolic and Bariatric surgery  1 month after surgery follow up note         Pt reports: Tolerating regular textures and eating protein foods at each meal. She has 3 meals a day with 2 snacks. Vitals:   Vitals:    10/12/22 1025 10/12/22 1114   BP: (!) 159/77 100/62   Site: Right Lower Arm Right Upper Arm   Position: Sitting Sitting   Cuff Size: Medium Adult Large Adult   Pulse: 71    Weight: (!) 328 lb (148.8 kg)    Height: 5' 2\" (1.575 m)       Body mass index is 59.99 kg/m². Labs reviewed:     Multivitamin/mineral intake: BA mvi  Calcium intake: BA ca supple   Other:            Nutrition Assessment:   PES: Inadequate food and beverage intake r/t WLS as evidenced by loss of excess body weight lost 50 lbs over 1 mo.       Goals   60-80gm of protein  48-64oz of fluid     [x] met     []  Not met        Plan:  Questions answered re diet advancement and tolerance  F/u 3 months after surgery         Sebastian Jama, MS, RD, LD

## 2022-10-12 NOTE — PROGRESS NOTES
Post-op Bariatric Surgery Note    Subjective     Patient is 6 weeks s/p laparoscopic sleeve gastrectomy, down 50 lbs. Overall, doing well. Incisions well healed. Consistent use of MVI and calcium. Tolerating po intake. Bowel function normal.  Physical activity includes walking. Off BP medication. GERD controlled on Pepcid daily. No pain or other specific problems or concerns. 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 Anitha Morel   .     Past Surgical History:  Past Surgical History:   Procedure Laterality Date    CARDIOVASCULAR STRESS TEST  2017    ENDOMETRIAL ABLATION      FOOT SURGERY Bilateral     ingrown toenail big toe    SLEEVE GASTRECTOMY  08/31/2022    ROBOTIC LAPAROSCOPIC GASTRECTOMY SLEEVE, EGD    SLEEVE GASTRECTOMY N/A 8/31/2022    XI ROBOTIC LAPAROSCOPIC GASTRECTOMY SLEEVE, EGD performed by Jerman Jenkins DO at [address] N/A 03/25/2022    EGD BIOPSY performed by Jerman Jenkins DO at [facility]       Family History:  Family History   Problem Relation Age of Onset    High Blood Pressure Mother Lung Cancer Mother     Cancer Mother     High Blood Pressure Father     Diabetes Father     COPD Father     High Blood Pressure Brother     Thyroid Cancer Brother     Cancer Brother     Heart Disease Maternal Grandmother     Depression Other     High Blood Pressure Other        Social History:  Social History     Socioeconomic History    Marital status: Single     Spouse name: Not on file    Number of children: Not on file    Years of education: Not on file    Highest education level: Not on file   Occupational History    Not on file   Tobacco Use    Smoking status: Never    Smokeless tobacco: Never   Substance and Sexual Activity    Alcohol use: No    Drug use: No    Sexual activity: Yes     Partners: Male   Other Topics Concern    Not on file   Social History Narrative    Not on file     Social Determinants of Health     Financial Resource Strain: Not on file   Food Insecurity: Not on file   Transportation Needs: Not on file   Physical Activity: Not on file   Stress: Not on file   Social Connections: Not on file   Intimate Partner Violence: Not on file   Housing Stability: Not on file       Current Medications:  Current Outpatient Medications   Medication Sig Dispense Refill    nystatin (MYCOSTATIN) 643231 UNIT/ML suspension Take 5 mLs by mouth 4 times daily 100 mL 0    cetirizine (ZYRTEC) 10 MG tablet TAKE 1 TABLET BY MOUTH EVERY DAY 90 tablet 1    famotidine (PEPCID) 40 MG tablet Take 1 tablet by mouth 2 times daily 180 tablet 1    acetaminophen (TYLENOL) 500 MG tablet Take 2 tablets by mouth 3 times daily as needed for Pain 180 tablet 1    cyclobenzaprine (FLEXERIL) 10 MG tablet Take 1 tablet by mouth 3 times daily as needed for Muscle spasms (Patient not taking: No sig reported) 28 tablet 0    promethazine (PHENERGAN) 25 MG tablet Take 1 tablet by mouth every 6 hours as needed for Nausea (Patient not taking: No sig reported) 28 tablet 0    montelukast (SINGULAIR) 10 MG tablet Take 1 tablet by mouth daily (Patient not taking: No sig reported) 90 tablet 1    albuterol sulfate  (90 Base) MCG/ACT inhaler Inhale 2 puffs into the lungs 4 times daily as needed for Wheezing (Patient not taking: No sig reported) 1 Inhaler 0    nystatin (MYCOSTATIN) 157018 UNIT/GM powder APPLY EXTERNALLY THREE TIMES A DAY  (Patient not taking: No sig reported) 60 g 0     No current facility-administered medications for this visit. Vital Signs:  /62 (Site: Right Upper Arm, Position: Sitting, Cuff Size: Large Adult)   Pulse 71   Ht 5' 2\" (1.575 m)   Wt (!) 328 lb (148.8 kg)   BMI 59.99 kg/m²     BMI/Height/Weight:  Body mass index is 59.99 kg/m². Review of Systems - A review of systems was performed. All was negative unless otherwise documented in HPI. Constitutional: Negative for fever, chills. HENT: Negative for trouble swallowing. Eyes: Negative for visual disturbance. Respiratory: Negative for cough, shortness of breath. Cardiovascular: Negative for chest pain and palpitations. Gastrointestinal: Negative for nausea, vomiting, abdominal pain, diarrhea, constipation, blood in stool and abdominal distention. Endocrine: Negative for polydipsia, polyphagia and polyuria. Genitourinary: Negative for dysuria, frequency, hematuria and difficulty urinating. Musculoskeletal: Negative for myalgias, joint swelling. Skin: Negative for pallor and rash. Neurological: Negative for dizziness, tremors, light-headedness and headaches. Psychiatric/Behavioral: Negative for sleep disturbance and dysphoric mood. Objective:      Physical Exam   Vital signs reviewed. General: Well-developed and well-nourished. No acute distress. Skin: Warm, dry and intact. HEENT: Normocephalic. EOMs intact. Conjunctivae normal. Neck supple. Cardiovascular: Normal rate, regular rhythm. Pulmonary/Chest: Normal effort. Lungs clear to auscultation. No rales, rhonchi or wheezing. Abdominal: Positive bowel sounds.  Soft, nontender. Nondistended. No rigidity, rebound, or guarding. Incisions well-healed. Musculoskeletal: Movement x4. No edema. Neurological: Gait normal. Alert and oriented to person, place, and time. Psychiatric: Normal mood and affect. Speech and behavior normal. Judgment and thought content normal. Cognition and memory intact. Assessment:       Diagnosis Orders   1. Essential hypertension, benign  CBC with Auto Differential    Comprehensive Metabolic Panel    Ferritin    Hemoglobin A1C    Iron and TIBC    Lipid Panel    Magnesium    PTH, Intact    TSH    Vitamin A    Vitamin B1    Vitamin B12 & Folate    Zinc    Vitamin D 25 Hydroxy      2. Hiatal hernia with GERD  CBC with Auto Differential    Comprehensive Metabolic Panel    Ferritin    Hemoglobin A1C    Iron and TIBC    Lipid Panel    Magnesium    PTH, Intact    TSH    Vitamin A    Vitamin B1    Vitamin B12 & Folate    Zinc    Vitamin D 25 Hydroxy      3. Morbid obesity (HCC)  CBC with Auto Differential    Comprehensive Metabolic Panel    Ferritin    Hemoglobin A1C    Iron and TIBC    Lipid Panel    Magnesium    PTH, Intact    TSH    Vitamin A    Vitamin B1    Vitamin B12 & Folate    Zinc    Vitamin D 25 Hydroxy      4. S/P laparoscopic sleeve gastrectomy  CBC with Auto Differential    Comprehensive Metabolic Panel    Ferritin    Hemoglobin A1C    Iron and TIBC    Lipid Panel    Magnesium    PTH, Intact    TSH    Vitamin A    Vitamin B1    Vitamin B12 & Folate    Zinc    Vitamin D 25 Hydroxy      5. Mild intermittent asthma without complication  CBC with Auto Differential    Comprehensive Metabolic Panel    Ferritin    Hemoglobin A1C    Iron and TIBC    Lipid Panel    Magnesium    PTH, Intact    TSH    Vitamin A    Vitamin B1    Vitamin B12 & Folate    Zinc    Vitamin D 25 Hydroxy      6.  Prediabetes  CBC with Auto Differential    Comprehensive Metabolic Panel    Ferritin    Hemoglobin A1C    Iron and TIBC    Lipid Panel    Magnesium    PTH, Intact    TSH Vitamin A    Vitamin B1    Vitamin B12 & Folate    Zinc    Vitamin D 25 Hydroxy      7. Lymphedema  CBC with Auto Differential    Comprehensive Metabolic Panel    Ferritin    Hemoglobin A1C    Iron and TIBC    Lipid Panel    Magnesium    PTH, Intact    TSH    Vitamin A    Vitamin B1    Vitamin B12 & Folate    Zinc    Vitamin D 25 Hydroxy      8. Arthritis  CBC with Auto Differential    Comprehensive Metabolic Panel    Ferritin    Hemoglobin A1C    Iron and TIBC    Lipid Panel    Magnesium    PTH, Intact    TSH    Vitamin A    Vitamin B1    Vitamin B12 & Folate    Zinc    Vitamin D 25 Hydroxy          Plan:    Dietitian visit today. Patient to continue to increase protein to obtain 60-80g/day, at least 48-64oz of fluid daily, and gradually increase exercise regimen. Post-op bariatric labs ordered. Follow-up  Return in about 2 months (around 12/12/2022). Orders this encounter:  Orders Placed This Encounter   Procedures    CBC with Auto Differential     Standing Status:   Future     Standing Expiration Date:   10/12/2023    Comprehensive Metabolic Panel     Standing Status:   Future     Standing Expiration Date:   10/12/2023    Ferritin     Standing Status:   Future     Standing Expiration Date:   10/12/2023    Hemoglobin A1C     Standing Status:   Future     Standing Expiration Date:   10/12/2023    Iron and TIBC     Standing Status:   Future     Standing Expiration Date:   10/12/2023     Order Specific Question:   Is Patient Fasting? Answer:   no     Order Specific Question:   No of Hours?      Answer:   0    Lipid Panel     Standing Status:   Future     Standing Expiration Date:   10/12/2023     Order Specific Question:   Is Patient Fasting?/# of Hours     Answer:   12    Magnesium     Standing Status:   Future     Standing Expiration Date:   10/12/2023    PTH, Intact     Standing Status:   Future     Standing Expiration Date:   10/12/2023    TSH     Standing Status:   Future     Standing Expiration Date:   10/12/2023    Vitamin A     Standing Status:   Future     Standing Expiration Date:   10/12/2023    Vitamin B1     Standing Status:   Future     Standing Expiration Date:   10/12/2023    Vitamin B12 & Folate     Standing Status:   Future     Standing Expiration Date:   10/12/2023    Zinc     Standing Status:   Future     Standing Expiration Date:   10/12/2023    Vitamin D 25 Hydroxy     Standing Status:   Future     Standing Expiration Date:   10/12/2023       Prescriptions this encounter:  No orders of the defined types were placed in this encounter.       Electronically signed by:  Sarah Cardoso CNP

## 2022-12-14 ENCOUNTER — HOSPITAL ENCOUNTER (OUTPATIENT)
Age: 47
Setting detail: SPECIMEN
Discharge: HOME | End: 2022-12-14

## 2022-12-14 DIAGNOSIS — Z98.84 S/P LAPAROSCOPIC SLEEVE GASTRECTOMY: ICD-10-CM

## 2022-12-14 DIAGNOSIS — I89.0 LYMPHEDEMA: ICD-10-CM

## 2022-12-14 DIAGNOSIS — K44.9 HIATAL HERNIA WITH GERD: ICD-10-CM

## 2022-12-14 DIAGNOSIS — J45.20 MILD INTERMITTENT ASTHMA WITHOUT COMPLICATION: ICD-10-CM

## 2022-12-14 DIAGNOSIS — R73.03 PREDIABETES: ICD-10-CM

## 2022-12-14 DIAGNOSIS — K21.9 HIATAL HERNIA WITH GERD: ICD-10-CM

## 2022-12-14 DIAGNOSIS — M19.90 ARTHRITIS: ICD-10-CM

## 2022-12-14 DIAGNOSIS — I10 ESSENTIAL HYPERTENSION, BENIGN: ICD-10-CM

## 2022-12-14 DIAGNOSIS — E66.01 MORBID OBESITY (HCC): ICD-10-CM

## 2022-12-14 LAB
ABSOLUTE EOS #: 0.13 K/UL (ref 0–0.44)
ABSOLUTE IMMATURE GRANULOCYTE: <0.03 K/UL (ref 0–0.3)
ABSOLUTE LYMPH #: 2.53 K/UL (ref 1.1–3.7)
ABSOLUTE MONO #: 0.71 K/UL (ref 0.1–1.2)
ALBUMIN SERPL-MCNC: 4.1 G/DL (ref 3.5–5.2)
ALBUMIN/GLOBULIN RATIO: 1.3 (ref 1–2.5)
ALP BLD-CCNC: 57 U/L (ref 35–104)
ALT SERPL-CCNC: 21 U/L (ref 5–33)
ANION GAP SERPL CALCULATED.3IONS-SCNC: 14 MMOL/L (ref 9–17)
AST SERPL-CCNC: 20 U/L
BASOPHILS # BLD: 0 % (ref 0–2)
BASOPHILS ABSOLUTE: <0.03 K/UL (ref 0–0.2)
BILIRUB SERPL-MCNC: 0.5 MG/DL (ref 0.3–1.2)
BUN BLDV-MCNC: 13 MG/DL (ref 6–20)
CALCIUM SERPL-MCNC: 9.6 MG/DL (ref 8.6–10.4)
CHLORIDE BLD-SCNC: 101 MMOL/L (ref 98–107)
CHOLESTEROL/HDL RATIO: 3.9
CHOLESTEROL: 141 MG/DL
CO2: 25 MMOL/L (ref 20–31)
CREAT SERPL-MCNC: 0.72 MG/DL (ref 0.5–0.9)
EOSINOPHILS RELATIVE PERCENT: 2 % (ref 1–4)
ESTIMATED AVERAGE GLUCOSE: 94 MG/DL
FERRITIN: 160 NG/ML (ref 13–150)
FOLATE: >20 NG/ML
GFR SERPL CREATININE-BSD FRML MDRD: >60 ML/MIN/1.73M2
GLUCOSE BLD-MCNC: 91 MG/DL (ref 70–99)
HBA1C MFR BLD: 4.9 % (ref 4–6)
HCT VFR BLD CALC: 44.3 % (ref 36.3–47.1)
HDLC SERPL-MCNC: 36 MG/DL
HEMOGLOBIN: 14.1 G/DL (ref 11.9–15.1)
IMMATURE GRANULOCYTES: 0 %
IRON SATURATION: 19 % (ref 20–55)
IRON: 53 UG/DL (ref 37–145)
LDL CHOLESTEROL: 73 MG/DL (ref 0–130)
LYMPHOCYTES # BLD: 36 % (ref 24–43)
MAGNESIUM: 1.8 MG/DL (ref 1.6–2.6)
MCH RBC QN AUTO: 27.6 PG (ref 25.2–33.5)
MCHC RBC AUTO-ENTMCNC: 31.8 G/DL (ref 28.4–34.8)
MCV RBC AUTO: 86.7 FL (ref 82.6–102.9)
MONOCYTES # BLD: 10 % (ref 3–12)
NRBC AUTOMATED: 0 PER 100 WBC
PDW BLD-RTO: 14.1 % (ref 11.8–14.4)
PLATELET # BLD: 231 K/UL (ref 138–453)
PMV BLD AUTO: 11.3 FL (ref 8.1–13.5)
POTASSIUM SERPL-SCNC: 4.2 MMOL/L (ref 3.7–5.3)
PTH INTACT: 43.7 PG/ML (ref 14–72)
RBC # BLD: 5.11 M/UL (ref 3.95–5.11)
SEG NEUTROPHILS: 52 % (ref 36–65)
SEGMENTED NEUTROPHILS ABSOLUTE COUNT: 3.65 K/UL (ref 1.5–8.1)
SODIUM BLD-SCNC: 140 MMOL/L (ref 135–144)
TOTAL IRON BINDING CAPACITY: 281 UG/DL (ref 250–450)
TOTAL PROTEIN: 7.3 G/DL (ref 6.4–8.3)
TRIGL SERPL-MCNC: 160 MG/DL
TSH SERPL DL<=0.05 MIU/L-ACNC: 2.41 UIU/ML (ref 0.3–5)
UNSATURATED IRON BINDING CAPACITY: 228 UG/DL (ref 112–347)
VITAMIN B-12: >2000 PG/ML (ref 232–1245)
VITAMIN D 25-HYDROXY: 52.1 NG/ML
WBC # BLD: 7.1 K/UL (ref 3.5–11.3)

## 2022-12-15 ENCOUNTER — OFFICE VISIT (OUTPATIENT)
Dept: BARIATRICS/WEIGHT MGMT | Age: 47
End: 2022-12-15
Payer: COMMERCIAL

## 2022-12-15 VITALS
DIASTOLIC BLOOD PRESSURE: 82 MMHG | BODY MASS INDEX: 53.92 KG/M2 | HEART RATE: 67 BPM | SYSTOLIC BLOOD PRESSURE: 121 MMHG | HEIGHT: 62 IN | WEIGHT: 293 LBS

## 2022-12-15 DIAGNOSIS — E66.01 MORBID OBESITY (HCC): ICD-10-CM

## 2022-12-15 DIAGNOSIS — R73.03 PREDIABETES: ICD-10-CM

## 2022-12-15 DIAGNOSIS — I89.0 LYMPHEDEMA: ICD-10-CM

## 2022-12-15 DIAGNOSIS — K21.9 HIATAL HERNIA WITH GERD: ICD-10-CM

## 2022-12-15 DIAGNOSIS — M19.90 ARTHRITIS: ICD-10-CM

## 2022-12-15 DIAGNOSIS — K44.9 HIATAL HERNIA WITH GERD: ICD-10-CM

## 2022-12-15 DIAGNOSIS — I10 ESSENTIAL HYPERTENSION, BENIGN: Primary | ICD-10-CM

## 2022-12-15 PROCEDURE — 3074F SYST BP LT 130 MM HG: CPT | Performed by: NURSE PRACTITIONER

## 2022-12-15 PROCEDURE — 3078F DIAST BP <80 MM HG: CPT | Performed by: NURSE PRACTITIONER

## 2022-12-15 PROCEDURE — 99213 OFFICE O/P EST LOW 20 MIN: CPT | Performed by: NURSE PRACTITIONER

## 2022-12-15 NOTE — PROGRESS NOTES
Medical Nutrition Therapy  Metabolic and Bariatric surgery  3 month follow up        Pt reports:         Vitals:   Vitals:    12/15/22 1133   BP: 121/82   Site: Right Upper Arm   Position: Sitting   Cuff Size: Large Adult   Pulse: 67   Weight: 294 lb (133.4 kg)   Height: 5' 2\" (1.575 m)      Body mass index is 53.77 kg/m². Labs reviewed:     Multivitamin/mineral intake:one daily  Calcium intake:   2 daily  Other:            Nutrition Assessment:   PES: Inadequate food and beverage intake r/t WLS as evidenced by loss of excess body weight 84lb      Goals   60-80gm of protein  48-64oz of fluid  Vitamin adherance  Basic adherance to WLS behavious and this document has been scanned into the chart.        [] met     []  Not met        Plan:   F/u 6 months after surgery         Davey Viveros RD, LD

## 2022-12-15 NOTE — PROGRESS NOTES
Post-op Bariatric Surgery Note    Subjective     Patient is 3 months s/p laparoscopic sleeve gastrectomy, down 84 lbs. Overall, doing well. Incisions well healed. Consistent use of MVI and calcium. Tolerating po intake. Bowel function normal.  Physical activity includes walking. Off BP medication. GERD controlled on Pepcid daily. Peripheral edema improving. No pain or other specific problems or concerns. 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 Hector Cortez   .     Past Surgical History:  Past Surgical History:   Procedure Laterality Date    CARDIOVASCULAR STRESS TEST  2017    ENDOMETRIAL ABLATION      FOOT SURGERY Bilateral     ingrown toenail big toe    SLEEVE GASTRECTOMY  08/31/2022    ROBOTIC LAPAROSCOPIC GASTRECTOMY SLEEVE, EGD    SLEEVE GASTRECTOMY N/A 8/31/2022    XI ROBOTIC LAPAROSCOPIC GASTRECTOMY SLEEVE, EGD performed by Ender Hein DO at [place] [address] N/A 03/25/2022    EGD BIOPSY performed by Ender Hein DO at [facility]       Family History:  Family History   Problem Relation Age of Onset High Blood Pressure Mother     Lung Cancer Mother     Cancer Mother     High Blood Pressure Father     Diabetes Father     COPD Father     High Blood Pressure Brother     Thyroid Cancer Brother     Cancer Brother     Heart Disease Maternal Grandmother     Depression Other     High Blood Pressure Other        Social History:  Social History     Socioeconomic History    Marital status: Single     Spouse name: Not on file    Number of children: Not on file    Years of education: Not on file    Highest education level: Not on file   Occupational History    Not on file   Tobacco Use    Smoking status: Never    Smokeless tobacco: Never   Substance and Sexual Activity    Alcohol use: No    Drug use: No    Sexual activity: Yes     Partners: Male   Other Topics Concern    Not on file   Social History Narrative    Not on file     Social Determinants of Health     Financial Resource Strain: Not on file   Food Insecurity: Not on file   Transportation Needs: Not on file   Physical Activity: Not on file   Stress: Not on file   Social Connections: Not on file   Intimate Partner Violence: Not on file   Housing Stability: Not on file       Current Medications:  Current Outpatient Medications   Medication Sig Dispense Refill    cetirizine (ZYRTEC) 10 MG tablet TAKE 1 TABLET BY MOUTH EVERY DAY 90 tablet 1    famotidine (PEPCID) 40 MG tablet Take 1 tablet by mouth 2 times daily 180 tablet 1    acetaminophen (TYLENOL) 500 MG tablet Take 2 tablets by mouth 3 times daily as needed for Pain 180 tablet 1    nystatin (MYCOSTATIN) 956002 UNIT/ML suspension Take 5 mLs by mouth 4 times daily (Patient not taking: Reported on 12/15/2022) 100 mL 0    cyclobenzaprine (FLEXERIL) 10 MG tablet Take 1 tablet by mouth 3 times daily as needed for Muscle spasms (Patient not taking: No sig reported) 28 tablet 0    promethazine (PHENERGAN) 25 MG tablet Take 1 tablet by mouth every 6 hours as needed for Nausea (Patient not taking: No sig reported) 28 tablet 0    montelukast (SINGULAIR) 10 MG tablet Take 1 tablet by mouth daily (Patient not taking: No sig reported) 90 tablet 1    albuterol sulfate  (90 Base) MCG/ACT inhaler Inhale 2 puffs into the lungs 4 times daily as needed for Wheezing (Patient not taking: No sig reported) 1 Inhaler 0    nystatin (MYCOSTATIN) 316901 UNIT/GM powder APPLY EXTERNALLY THREE TIMES A DAY  (Patient not taking: No sig reported) 60 g 0     No current facility-administered medications for this visit. Vital Signs:  /82 (Site: Right Upper Arm, Position: Sitting, Cuff Size: Large Adult)   Pulse 67   Ht 5' 2\" (1.575 m)   Wt 294 lb (133.4 kg)   BMI 53.77 kg/m²     BMI/Height/Weight:  Body mass index is 53.77 kg/m². Review of Systems - A review of systems was performed. All was negative unless otherwise documented in HPI. Constitutional: Negative for fever, chills. HENT: Negative for trouble swallowing. Eyes: Negative for visual disturbance. Respiratory: Negative for cough, shortness of breath. Cardiovascular: Negative for chest pain and palpitations. Gastrointestinal: Negative for nausea, vomiting, abdominal pain, diarrhea, constipation, blood in stool and abdominal distention. Endocrine: Negative for polydipsia, polyphagia and polyuria. Genitourinary: Negative for dysuria, frequency, hematuria and difficulty urinating. Musculoskeletal: Negative for myalgias, joint swelling. Skin: Negative for pallor and rash. Neurological: Negative for dizziness, tremors, light-headedness and headaches. Psychiatric/Behavioral: Negative for sleep disturbance and dysphoric mood. Objective:      Physical Exam   Vital signs reviewed. General: Well-developed and well-nourished. No acute distress. Skin: Warm, dry and intact. HEENT: Normocephalic. EOMs intact. Conjunctivae normal. Neck supple. Cardiovascular: Normal rate, regular rhythm. Pulmonary/Chest: Normal effort. Lungs clear to auscultation.  No rales, rhonchi or wheezing. Abdominal: Positive bowel sounds. Soft, nontender. Nondistended. No rigidity, rebound, or guarding. Incisions well-healed. Musculoskeletal: Movement x4. Bilateral lower extremity edema. Neurological: Gait normal. Alert and oriented to person, place, and time. Psychiatric: Normal mood and affect. Speech and behavior normal. Judgment and thought content normal. Cognition and memory intact. Assessment:       Diagnosis Orders   1. Essential hypertension, benign  Comprehensive Metabolic Panel    TSH    T4, Free    Hemoglobin A1C    Vitamin B12 & Folate    Vitamin B1    Vitamin D 25 Hydroxy    Magnesium    Iron and TIBC    Vitamin A    Lipid Panel    PTH, Intact    CBC with Auto Differential    Zinc    Ferritin      2. Hiatal hernia with GERD  Comprehensive Metabolic Panel    TSH    T4, Free    Hemoglobin A1C    Vitamin B12 & Folate    Vitamin B1    Vitamin D 25 Hydroxy    Magnesium    Iron and TIBC    Vitamin A    Lipid Panel    PTH, Intact    CBC with Auto Differential    Zinc    Ferritin      3. Morbid obesity (HCC)  Comprehensive Metabolic Panel    TSH    T4, Free    Hemoglobin A1C    Vitamin B12 & Folate    Vitamin B1    Vitamin D 25 Hydroxy    Magnesium    Iron and TIBC    Vitamin A    Lipid Panel    PTH, Intact    CBC with Auto Differential    Zinc    Ferritin      4. Prediabetes  Comprehensive Metabolic Panel    TSH    T4, Free    Hemoglobin A1C    Vitamin B12 & Folate    Vitamin B1    Vitamin D 25 Hydroxy    Magnesium    Iron and TIBC    Vitamin A    Lipid Panel    PTH, Intact    CBC with Auto Differential    Zinc    Ferritin      5. Arthritis  Comprehensive Metabolic Panel    TSH    T4, Free    Hemoglobin A1C    Vitamin B12 & Folate    Vitamin B1    Vitamin D 25 Hydroxy    Magnesium    Iron and TIBC    Vitamin A    Lipid Panel    PTH, Intact    CBC with Auto Differential    Zinc    Ferritin      6.  Lymphedema  Comprehensive Metabolic Panel    TSH    T4, Free    Hemoglobin A1C    Vitamin B12 & Folate    Vitamin B1    Vitamin D 25 Hydroxy    Magnesium    Iron and TIBC    Vitamin A    Lipid Panel    PTH, Intact    CBC with Auto Differential    Zinc    Ferritin          Plan:    Dietitian visit today. Patient to continue to increase protein to obtain 60-80g/day, at least 48-64oz of fluid daily, and gradually increase exercise regimen. Labs reviewed and discussed with patient. Acceptable. Zinc, Vitamin B1 and Vitamin A still pending. Post-op bariatric labs ordered for next visit. Follow-up  Return in about 3 months (around 3/15/2023). Orders this encounter:  Orders Placed This Encounter   Procedures    Comprehensive Metabolic Panel     Standing Status:   Future     Standing Expiration Date:   12/15/2023    TSH     Standing Status:   Future     Standing Expiration Date:   12/15/2023    T4, Free     Standing Status:   Future     Standing Expiration Date:   12/15/2023    Hemoglobin A1C     Standing Status:   Future     Standing Expiration Date:   12/15/2023    Vitamin B12 & Folate     Standing Status:   Future     Standing Expiration Date:   12/15/2023    Vitamin B1     Standing Status:   Future     Standing Expiration Date:   12/15/2023    Vitamin D 25 Hydroxy     Standing Status:   Future     Standing Expiration Date:   12/15/2023    Magnesium     Standing Status:   Future     Standing Expiration Date:   12/15/2023    Iron and TIBC     Standing Status:   Future     Standing Expiration Date:   12/15/2023     Order Specific Question:   Is Patient Fasting? Answer:   Yes     Order Specific Question:   No of Hours?      Answer:   12 hours    Vitamin A     Standing Status:   Future     Standing Expiration Date:   12/15/2023    Lipid Panel     Standing Status:   Future     Standing Expiration Date:   12/15/2023     Order Specific Question:   Is Patient Fasting?/# of Hours     Answer:   12 hrs    PTH, Intact     Standing Status:   Future     Standing Expiration Date: 12/15/2023    CBC with Auto Differential     Standing Status:   Future     Standing Expiration Date:   12/15/2023    Zinc     Standing Status:   Future     Standing Expiration Date:   12/16/2023    Ferritin     Standing Status:   Future     Standing Expiration Date:   12/16/2023       Prescriptions this encounter:  No orders of the defined types were placed in this encounter.       Electronically signed by:  Jyoti Ambriz CNP

## 2022-12-17 LAB
RETINYL PALMITATE: <0.02 MG/L (ref 0–0.1)
VITAMIN A LEVEL: 0.62 MG/L (ref 0.3–1.2)
VITAMIN A, INTERP: NORMAL
ZINC: 70.3 UG/DL (ref 60–120)

## 2023-03-09 ENCOUNTER — HOSPITAL ENCOUNTER (OUTPATIENT)
Age: 48
Setting detail: SPECIMEN
Discharge: HOME | End: 2023-03-09

## 2023-03-09 DIAGNOSIS — M19.90 ARTHRITIS: ICD-10-CM

## 2023-03-09 DIAGNOSIS — I89.0 LYMPHEDEMA: ICD-10-CM

## 2023-03-09 DIAGNOSIS — I10 ESSENTIAL HYPERTENSION, BENIGN: ICD-10-CM

## 2023-03-09 DIAGNOSIS — R73.03 PREDIABETES: ICD-10-CM

## 2023-03-09 DIAGNOSIS — K21.9 HIATAL HERNIA WITH GERD: ICD-10-CM

## 2023-03-09 DIAGNOSIS — E66.01 MORBID OBESITY (HCC): ICD-10-CM

## 2023-03-09 DIAGNOSIS — K44.9 HIATAL HERNIA WITH GERD: ICD-10-CM

## 2023-03-09 LAB
25(OH)D3 SERPL-MCNC: 58.6 NG/ML
ABSOLUTE EOS #: 0.12 K/UL (ref 0–0.44)
ABSOLUTE IMMATURE GRANULOCYTE: <0.03 K/UL (ref 0–0.3)
ABSOLUTE LYMPH #: 3.36 K/UL (ref 1.1–3.7)
ABSOLUTE MONO #: 0.65 K/UL (ref 0.1–1.2)
ALBUMIN SERPL-MCNC: 4.7 G/DL (ref 3.5–5.2)
ALBUMIN/GLOBULIN RATIO: 1.5 (ref 1–2.5)
ALP SERPL-CCNC: 61 U/L (ref 35–104)
ALT SERPL-CCNC: 18 U/L (ref 5–33)
ANION GAP SERPL CALCULATED.3IONS-SCNC: 10 MMOL/L (ref 9–17)
AST SERPL-CCNC: 18 U/L
BASOPHILS # BLD: 0 % (ref 0–2)
BASOPHILS ABSOLUTE: 0.03 K/UL (ref 0–0.2)
BILIRUB SERPL-MCNC: 0.6 MG/DL (ref 0.3–1.2)
BUN SERPL-MCNC: 18 MG/DL (ref 6–20)
CALCIUM SERPL-MCNC: 10.2 MG/DL (ref 8.6–10.4)
CHLORIDE SERPL-SCNC: 97 MMOL/L (ref 98–107)
CHOLEST SERPL-MCNC: 162 MG/DL
CHOLESTEROL/HDL RATIO: 4
CO2 SERPL-SCNC: 30 MMOL/L (ref 20–31)
CREAT SERPL-MCNC: 0.73 MG/DL (ref 0.5–0.9)
EOSINOPHILS RELATIVE PERCENT: 2 % (ref 1–4)
EST. AVERAGE GLUCOSE BLD GHB EST-MCNC: 94 MG/DL
FERRITIN SERPL-MCNC: 246 NG/ML (ref 13–150)
FOLATE SERPL-MCNC: >20 NG/ML
GFR SERPL CREATININE-BSD FRML MDRD: >60 ML/MIN/1.73M2
GLUCOSE SERPL-MCNC: 90 MG/DL (ref 70–99)
HBA1C MFR BLD: 4.9 % (ref 4–6)
HCT VFR BLD AUTO: 44.2 % (ref 36.3–47.1)
HDLC SERPL-MCNC: 41 MG/DL
HGB BLD-MCNC: 14.4 G/DL (ref 11.9–15.1)
IMMATURE GRANULOCYTES: 0 %
IRON SATURATION: 29 % (ref 20–55)
IRON SERPL-MCNC: 81 UG/DL (ref 37–145)
LDLC SERPL CALC-MCNC: 91 MG/DL (ref 0–130)
LYMPHOCYTES # BLD: 44 % (ref 24–43)
MAGNESIUM SERPL-MCNC: 1.9 MG/DL (ref 1.6–2.6)
MCH RBC QN AUTO: 27.7 PG (ref 25.2–33.5)
MCHC RBC AUTO-ENTMCNC: 32.6 G/DL (ref 28.4–34.8)
MCV RBC AUTO: 85 FL (ref 82.6–102.9)
MONOCYTES # BLD: 9 % (ref 3–12)
NRBC AUTOMATED: 0 PER 100 WBC
PDW BLD-RTO: 13.7 % (ref 11.8–14.4)
PLATELET # BLD AUTO: 237 K/UL (ref 138–453)
PMV BLD AUTO: 11.1 FL (ref 8.1–13.5)
POTASSIUM SERPL-SCNC: 4.3 MMOL/L (ref 3.7–5.3)
PROT SERPL-MCNC: 7.8 G/DL (ref 6.4–8.3)
PTH-INTACT SERPL-MCNC: 28.8 PG/ML (ref 14–72)
RBC # BLD: 5.2 M/UL (ref 3.95–5.11)
SEG NEUTROPHILS: 45 % (ref 36–65)
SEGMENTED NEUTROPHILS ABSOLUTE COUNT: 3.42 K/UL (ref 1.5–8.1)
SODIUM SERPL-SCNC: 137 MMOL/L (ref 135–144)
T4 FREE SERPL-MCNC: 1.51 NG/DL (ref 0.93–1.7)
TIBC SERPL-MCNC: 282 UG/DL (ref 250–450)
TRIGL SERPL-MCNC: 149 MG/DL
TSH SERPL-ACNC: 1.66 UIU/ML (ref 0.3–5)
UNSATURATED IRON BINDING CAPACITY: 201 UG/DL (ref 112–347)
VIT B12 SERPL-MCNC: >2000 PG/ML (ref 232–1245)
WBC # BLD AUTO: 7.6 K/UL (ref 3.5–11.3)

## 2023-03-11 LAB — ZINC: 81.5 UG/DL (ref 60–120)

## 2023-03-12 LAB
RETINYL PALMITATE: <0.02 MG/L (ref 0–0.1)
VITAMIN A LEVEL: 0.73 MG/L (ref 0.3–1.2)
VITAMIN A, INTERP: NORMAL

## 2023-03-15 ENCOUNTER — OFFICE VISIT (OUTPATIENT)
Dept: BARIATRICS/WEIGHT MGMT | Age: 48
End: 2023-03-15
Payer: COMMERCIAL

## 2023-03-15 VITALS
BODY MASS INDEX: 48.58 KG/M2 | DIASTOLIC BLOOD PRESSURE: 82 MMHG | SYSTOLIC BLOOD PRESSURE: 137 MMHG | WEIGHT: 264 LBS | HEIGHT: 62 IN | HEART RATE: 71 BPM

## 2023-03-15 DIAGNOSIS — K21.9 HIATAL HERNIA WITH GERD: Primary | ICD-10-CM

## 2023-03-15 DIAGNOSIS — R73.03 PREDIABETES: ICD-10-CM

## 2023-03-15 DIAGNOSIS — Z98.84 S/P LAPAROSCOPIC SLEEVE GASTRECTOMY: ICD-10-CM

## 2023-03-15 DIAGNOSIS — M19.90 ARTHRITIS: ICD-10-CM

## 2023-03-15 DIAGNOSIS — I89.0 LYMPHEDEMA: ICD-10-CM

## 2023-03-15 DIAGNOSIS — J45.20 MILD INTERMITTENT ASTHMA WITHOUT COMPLICATION: ICD-10-CM

## 2023-03-15 DIAGNOSIS — E66.01 MORBID OBESITY (HCC): ICD-10-CM

## 2023-03-15 DIAGNOSIS — K44.9 HIATAL HERNIA WITH GERD: Primary | ICD-10-CM

## 2023-03-15 PROCEDURE — 99213 OFFICE O/P EST LOW 20 MIN: CPT | Performed by: NURSE PRACTITIONER

## 2023-03-15 PROCEDURE — 3079F DIAST BP 80-89 MM HG: CPT | Performed by: NURSE PRACTITIONER

## 2023-03-15 PROCEDURE — 3075F SYST BP GE 130 - 139MM HG: CPT | Performed by: NURSE PRACTITIONER

## 2023-03-15 NOTE — PROGRESS NOTES
Medical Nutrition Therapy  Metabolic and Bariatric surgery  6 month follow up note         Pt reports: Patient is doing well. Tolerating diet. Patient is eating protein first and eating small, frequent meals. She is listening to her hunger and satiety cues and doing many fun activities with her daughter. Patient is drinking at least 64 oz of fluid and sugar free beverages. Consistently taking vitamins and minerals. Patient is doing aqua aerobics to remain active.       Vitals:   Vitals:    03/15/23 0957   BP: 137/82   Site: Right Upper Arm   Position: Sitting   Cuff Size: Large Adult   Pulse: 71   Weight: 264 lb (119.7 kg)   Height: 5' 2\" (1.575 m)      Body mass index is 48.29 kg/m².      Multivitamin/mineral intake: Bariatric Advantage MVI  Calcium intake: Bariatric Advantage Calcium Citrate       Nutrition Assessment:   PES: Inadequate food and beverage intake r/t WLS as evidenced by loss of excess body weight lost 114 lbs over 6 months.      Goals   60-80gm of protein  48-64oz of fluid  Vitamin adherance  Basic adherance to WLS behavious and this document has been scanned into the chart.       [x] met     []  Not met      Plan: F/u 9 months after surgery         Deborah Diamond, MS, RD, LD

## 2023-03-15 NOTE — PROGRESS NOTES
Post-op Bariatric Surgery Note    Subjective     Patient is 6 months s/p laparoscopic sleeve gastrectomy, down 114 lbs. Overall, doing well. Incisions well healed. Consistent use of MVI and calcium. Tolerating po intake. Bowel function normal.  Physical activity includes aquarobics. Off DM and BP medication. GERD controlled on Pepcid daily. Peripheral edema improving. Some back pain. No other specific problems or concerns. 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 Lia Gonsales   .     Past Surgical History:  Past Surgical History:   Procedure Laterality Date    CARDIOVASCULAR STRESS TEST  2017    ENDOMETRIAL ABLATION      FOOT SURGERY Bilateral     ingrown toenail big toe    SLEEVE GASTRECTOMY  08/31/2022    ROBOTIC LAPAROSCOPIC GASTRECTOMY SLEEVE, EGD    SLEEVE GASTRECTOMY N/A 8/31/2022    XI ROBOTIC LAPAROSCOPIC GASTRECTOMY SLEEVE, EGD performed by Ammon Newberry DO at [place] [address] N/A 03/25/2022    EGD BIOPSY performed by Ammon Newberry DO at [facility]       Family History:  Family History   Problem Relation Age of Onset    High Blood Pressure Mother     Lung Cancer Mother     Cancer Mother     High Blood Pressure Father     Diabetes Father     COPD Father     High Blood Pressure Brother     Thyroid Cancer Brother     Cancer Brother     Heart Disease Maternal Grandmother     Depression Other     High Blood Pressure Other        Social History:  Social History     Socioeconomic History    Marital status: Single     Spouse name: Not on file    Number of children: Not on file    Years of education: Not on file    Highest education level: Not on file   Occupational History    Not on file   Tobacco Use    Smoking status: Never    Smokeless tobacco: Never   Substance and Sexual Activity    Alcohol use: No    Drug use: No    Sexual activity: Yes     Partners: Male   Other Topics Concern    Not on file   Social History Narrative    Not on file     Social Determinants of Health     Financial Resource Strain: Not on file   Food Insecurity: Not on file   Transportation Needs: Not on file   Physical Activity: Not on file   Stress: Not on file   Social Connections: Not on file   Intimate Partner Violence: Not on file   Housing Stability: Not on file       Current Medications:  Current Outpatient Medications   Medication Sig Dispense Refill    cetirizine (ZYRTEC) 10 MG tablet TAKE 1 TABLET BY MOUTH EVERY DAY 90 tablet 1    famotidine (PEPCID) 40 MG tablet Take 1 tablet by mouth 2 times daily 180 tablet 1    montelukast (SINGULAIR) 10 MG tablet Take 1 tablet by mouth daily 90 tablet 1    albuterol sulfate HFA (PROVENTIL;VENTOLIN;PROAIR) 108 (90 Base) MCG/ACT inhaler Inhale 2 puffs into the lungs 4 times daily as needed for Wheezing 1 each 0     No current facility-administered medications for this visit.        Vital Signs:  /82 (Site: Right Upper Arm, Position: Sitting, Cuff Size: Large Adult)   Pulse 71   Ht 5' 2\" (1.575 m)   Wt 264 lb (119.7 kg)   BMI 48.29 kg/m²     BMI/Height/Weight:  Body mass index is 48.29 kg/m². Review of Systems - A review of systems was performed. All was negative unless otherwise documented in HPI. Constitutional: Negative for fever, chills. HENT: Negative for trouble swallowing. Eyes: Negative for visual disturbance. Respiratory: Negative for cough, shortness of breath. Cardiovascular: Negative for chest pain and palpitations. Gastrointestinal: Negative for nausea, vomiting, abdominal pain, diarrhea, constipation, blood in stool and abdominal distention. Endocrine: Negative for polydipsia, polyphagia and polyuria. Genitourinary: Negative for dysuria, frequency, hematuria and difficulty urinating. Musculoskeletal: Negative for myalgias, joint swelling. Positive for back pain. Skin: Negative for pallor and rash. Neurological: Negative for dizziness, tremors, light-headedness and headaches. Psychiatric/Behavioral: Negative for sleep disturbance and dysphoric mood. Objective:      Physical Exam   Vital signs reviewed. General: Well-developed and well-nourished. No acute distress. Skin: Warm, dry and intact. HEENT: Normocephalic. EOMs intact. Conjunctivae normal. Neck supple. Cardiovascular: Normal rate, regular rhythm. Pulmonary/Chest: Normal effort. Lungs clear to auscultation. No rales, rhonchi or wheezing. Abdominal: Positive bowel sounds. Soft, nontender. Nondistended. No rigidity, rebound, or guarding. Incisions well-healed. Musculoskeletal: Movement x4. Bilateral lower extremity edema. Neurological: Gait normal. Alert and oriented to person, place, and time. Psychiatric: Normal mood and affect. Speech and behavior normal. Judgment and thought content normal. Cognition and memory intact. Assessment:       Diagnosis Orders   1. Hiatal hernia with GERD        2. Morbid obesity ([address]        3. S/P laparoscopic sleeve gastrectomy        4. Mild intermittent asthma without complication        5. Prediabetes        6. Lymphedema        7. Arthritis            Plan:    Dietitian visit today. Patient to continue to increase protein to obtain 60-80g/day, at least 48-64oz of fluid daily, and gradually increase exercise regimen. Labs reviewed and discussed with patient. Ferritin mildly elevated, otherwise acceptable. Follow-up  Return in about 3 months (around 6/15/2023). Orders this encounter:  No orders of the defined types were placed in this encounter. Prescriptions this encounter:  No orders of the defined types were placed in this encounter.       Electronically signed by:  Doris Scott CNP

## 2023-07-20 ENCOUNTER — HOSPITAL ENCOUNTER (EMERGENCY)
Facility: CLINIC | Age: 48
Discharge: HOME | End: 2023-07-20
Attending: EMERGENCY MEDICINE
Payer: COMMERCIAL

## 2023-07-20 ENCOUNTER — APPOINTMENT (OUTPATIENT)
Dept: CT IMAGING | Facility: CLINIC | Age: 48
End: 2023-07-20
Payer: COMMERCIAL

## 2023-07-20 VITALS
SYSTOLIC BLOOD PRESSURE: 157 MMHG | OXYGEN SATURATION: 98 % | RESPIRATION RATE: 18 BRPM | DIASTOLIC BLOOD PRESSURE: 100 MMHG | BODY MASS INDEX: 45.18 KG/M2 | WEIGHT: 247 LBS | HEART RATE: 78 BPM | TEMPERATURE: 98.2 F

## 2023-07-20 DIAGNOSIS — K62.89 PROCTITIS: Primary | ICD-10-CM

## 2023-07-20 DIAGNOSIS — N94.9 ADNEXAL CYST: ICD-10-CM

## 2023-07-20 LAB
ALBUMIN SERPL-MCNC: 4.6 G/DL (ref 3.5–5.2)
ALBUMIN/GLOB SERPL: 1.2 {RATIO} (ref 1–2.5)
ALP SERPL-CCNC: 56 U/L (ref 35–104)
ALT SERPL-CCNC: 16 U/L (ref 5–33)
ANION GAP SERPL CALCULATED.3IONS-SCNC: 11 MMOL/L (ref 9–17)
AST SERPL-CCNC: 19 U/L
BACTERIA URNS QL MICRO: ABNORMAL
BASOPHILS # BLD: 0.1 K/UL (ref 0–0.2)
BASOPHILS NFR BLD: 1 % (ref 0–2)
BILIRUB SERPL-MCNC: 0.5 MG/DL (ref 0.3–1.2)
BILIRUB UR QL STRIP: NEGATIVE
BUN SERPL-MCNC: 24 MG/DL (ref 6–20)
CALCIUM SERPL-MCNC: 10.4 MG/DL (ref 8.6–10.4)
CHARACTER UR: ABNORMAL
CHLORIDE SERPL-SCNC: 99 MMOL/L (ref 98–107)
CLARITY UR: ABNORMAL
CO2 SERPL-SCNC: 30 MMOL/L (ref 20–31)
COLOR UR: YELLOW
CREAT SERPL-MCNC: 0.7 MG/DL (ref 0.5–0.9)
EOSINOPHIL # BLD: 0.1 K/UL (ref 0–0.4)
EOSINOPHILS RELATIVE PERCENT: 1 % (ref 1–4)
EPI CELLS #/AREA URNS HPF: ABNORMAL /HPF (ref 0–5)
ERYTHROCYTE [DISTWIDTH] IN BLOOD BY AUTOMATED COUNT: 13.5 % (ref 12.5–15.4)
GFR SERPL CREATININE-BSD FRML MDRD: >60 ML/MIN/1.73M2
GLUCOSE SERPL-MCNC: 101 MG/DL (ref 70–99)
GLUCOSE UR STRIP-MCNC: NEGATIVE MG/DL
HCT VFR BLD AUTO: 44.4 % (ref 36–46)
HGB BLD-MCNC: 15 G/DL (ref 12–16)
HGB UR QL STRIP.AUTO: ABNORMAL
INR PPP: 1
KETONES UR STRIP-MCNC: ABNORMAL MG/DL
LEUKOCYTE ESTERASE UR QL STRIP: ABNORMAL
LIPASE SERPL-CCNC: 29 U/L (ref 13–60)
LYMPHOCYTES NFR BLD: 2.7 K/UL (ref 1–4.8)
LYMPHOCYTES RELATIVE PERCENT: 24 % (ref 24–44)
MAGNESIUM SERPL-MCNC: 1.8 MG/DL (ref 1.6–2.6)
MCH RBC QN AUTO: 28.4 PG (ref 26–34)
MCHC RBC AUTO-ENTMCNC: 33.8 G/DL (ref 31–37)
MCV RBC AUTO: 84 FL (ref 80–100)
MONOCYTES NFR BLD: 0.9 K/UL (ref 0.1–1.2)
MONOCYTES NFR BLD: 8 % (ref 2–11)
NEUTROPHILS NFR BLD: 66 % (ref 36–66)
NEUTS SEG NFR BLD: 7.5 K/UL (ref 1.8–7.7)
NITRITE UR QL STRIP: NEGATIVE
PARTIAL THROMBOPLASTIN TIME: 26.4 SEC (ref 21.3–31.3)
PH UR STRIP: 5.5 [PH] (ref 5–8)
PLATELET # BLD AUTO: 222 K/UL (ref 140–450)
PMV BLD AUTO: 8.2 FL (ref 6–12)
POTASSIUM SERPL-SCNC: 4.2 MMOL/L (ref 3.7–5.3)
PROT SERPL-MCNC: 8.3 G/DL (ref 6.4–8.3)
PROT UR STRIP-MCNC: ABNORMAL MG/DL
PROTHROMBIN TIME: 10.4 SEC (ref 9.4–12.6)
RBC # BLD AUTO: 5.28 M/UL (ref 4–5.2)
RBC #/AREA URNS HPF: ABNORMAL /HPF (ref 0–2)
SODIUM SERPL-SCNC: 140 MMOL/L (ref 135–144)
SP GR UR STRIP: 1.03 (ref 1–1.03)
UROBILINOGEN UR STRIP-ACNC: NORMAL EU/DL (ref 0–1)
WBC #/AREA URNS HPF: ABNORMAL /HPF (ref 0–5)
WBC OTHER # BLD: 11.2 K/UL (ref 3.5–11)

## 2023-07-20 PROCEDURE — 6370000000 HC RX 637 (ALT 250 FOR IP): Performed by: EMERGENCY MEDICINE

## 2023-07-20 PROCEDURE — 81001 URINALYSIS AUTO W/SCOPE: CPT

## 2023-07-20 PROCEDURE — 85730 THROMBOPLASTIN TIME PARTIAL: CPT

## 2023-07-20 PROCEDURE — 83735 ASSAY OF MAGNESIUM: CPT

## 2023-07-20 PROCEDURE — 99285 EMERGENCY DEPT VISIT HI MDM: CPT

## 2023-07-20 PROCEDURE — 80053 COMPREHEN METABOLIC PANEL: CPT

## 2023-07-20 PROCEDURE — 85610 PROTHROMBIN TIME: CPT

## 2023-07-20 PROCEDURE — 74177 CT ABD & PELVIS W/CONTRAST: CPT

## 2023-07-20 PROCEDURE — 36415 COLL VENOUS BLD VENIPUNCTURE: CPT

## 2023-07-20 PROCEDURE — 2580000003 HC RX 258: Performed by: EMERGENCY MEDICINE

## 2023-07-20 PROCEDURE — 83690 ASSAY OF LIPASE: CPT

## 2023-07-20 PROCEDURE — 6360000004 HC RX CONTRAST MEDICATION: Performed by: EMERGENCY MEDICINE

## 2023-07-20 PROCEDURE — 85027 COMPLETE CBC AUTOMATED: CPT

## 2023-07-20 RX ORDER — CIPROFLOXACIN 250 MG/1
500 TABLET, FILM COATED ORAL ONCE
Status: COMPLETED | OUTPATIENT
Start: 2023-07-20 | End: 2023-07-20

## 2023-07-20 RX ORDER — SODIUM CHLORIDE 0.9 % (FLUSH) 0.9 %
10 SYRINGE (ML) INJECTION PRN
Status: DISCONTINUED | OUTPATIENT
Start: 2023-07-20 | End: 2023-07-20 | Stop reason: HOSPADM

## 2023-07-20 RX ORDER — CIPROFLOXACIN 500 MG/1
500 TABLET, FILM COATED ORAL 2 TIMES DAILY
Qty: 14 TABLET | Refills: 0 | Status: SHIPPED | OUTPATIENT
Start: 2023-07-20 | End: 2023-07-27

## 2023-07-20 RX ORDER — METRONIDAZOLE 500 MG/1
500 TABLET ORAL ONCE
Status: COMPLETED | OUTPATIENT
Start: 2023-07-20 | End: 2023-07-20

## 2023-07-20 RX ORDER — 0.9 % SODIUM CHLORIDE 0.9 %
80 INTRAVENOUS SOLUTION INTRAVENOUS ONCE
Status: COMPLETED | OUTPATIENT
Start: 2023-07-20 | End: 2023-07-20

## 2023-07-20 RX ORDER — 0.9 % SODIUM CHLORIDE 0.9 %
1000 INTRAVENOUS SOLUTION INTRAVENOUS ONCE
Status: COMPLETED | OUTPATIENT
Start: 2023-07-20 | End: 2023-07-20

## 2023-07-20 RX ORDER — METRONIDAZOLE 500 MG/1
500 TABLET ORAL 2 TIMES DAILY
Qty: 14 TABLET | Refills: 0 | Status: SHIPPED | OUTPATIENT
Start: 2023-07-20 | End: 2023-07-27

## 2023-07-20 RX ADMIN — IOPAMIDOL 75 ML: 755 INJECTION, SOLUTION INTRAVENOUS at 17:19

## 2023-07-20 RX ADMIN — METRONIDAZOLE 500 MG: 500 TABLET ORAL at 18:37

## 2023-07-20 RX ADMIN — SODIUM CHLORIDE 80 ML: 9 INJECTION, SOLUTION INTRAVENOUS at 17:20

## 2023-07-20 RX ADMIN — SODIUM CHLORIDE 1000 ML: 9 INJECTION, SOLUTION INTRAVENOUS at 16:56

## 2023-07-20 RX ADMIN — SODIUM CHLORIDE, PRESERVATIVE FREE 10 ML: 5 INJECTION INTRAVENOUS at 17:20

## 2023-07-20 RX ADMIN — CIPROFLOXACIN 500 MG: 250 TABLET, FILM COATED ORAL at 18:37

## 2023-07-20 ASSESSMENT — PAIN DESCRIPTION - LOCATION: LOCATION: ABDOMEN

## 2023-07-20 ASSESSMENT — ENCOUNTER SYMPTOMS
RHINORRHEA: 0
VOMITING: 0
SHORTNESS OF BREATH: 0
BACK PAIN: 0
COUGH: 0
SORE THROAT: 0
EYE PAIN: 0
DIARRHEA: 0
ABDOMINAL PAIN: 1
BLOOD IN STOOL: 1
NAUSEA: 0

## 2023-07-20 ASSESSMENT — PAIN SCALES - GENERAL: PAINLEVEL_OUTOF10: 3

## 2023-07-20 ASSESSMENT — PAIN DESCRIPTION - DESCRIPTORS: DESCRIPTORS: CRAMPING

## 2023-07-20 ASSESSMENT — PAIN DESCRIPTION - PAIN TYPE: TYPE: ACUTE PAIN

## 2023-07-20 ASSESSMENT — PAIN DESCRIPTION - ORIENTATION: ORIENTATION: LOWER;MID

## 2023-07-20 ASSESSMENT — PAIN - FUNCTIONAL ASSESSMENT: PAIN_FUNCTIONAL_ASSESSMENT: 0-10

## 2023-07-20 NOTE — DISCHARGE INSTRUCTIONS
PLEASE RETURN TO THE EMERGENCY DEPARTMENT IMMEDIATELY if your symptoms worsen in anyway or in 8-12 hours if not improved for re-evaluation. You should immediately return to the ER for symptoms such as increasing pain, bloody stool, fever, a feeling of passing out, light headed, dizziness, chest pain, shortness of breath, persistent nausea and/or vomiting, numbness or weakness to the arms or legs, coolness or color change of the arms or legs. Take your medication as indicated and prescribed. If you are given an antibiotic then, make sure you get the prescription filled and take the antibiotics until finished. Please understand that at this time there is no evidence for a more serious underlying process, but that early in the process of an illness or injury, an emergency department workup can be falsely reassuring. You should contact your family doctor within the next 24 hours for a follow up appointment    [facility]!!!    From [facility] ([facility]) and [facility]    On behalf of the Emergency Department staff at [facility]), I would like to thank you for giving us the opportunity to address your health care needs and concerns. We hope that during your visit, our service was delivered in a professional and caring manner. Please keep [facility]) in mind as we walk with you down the path to your own personal wellness. Please expect an automated text message or email from us so we can ask a few questions about your health and progress. Based on your answers, a clinician may call you back to offer help and instructions. Please understand that early in the process of an illness or injury, an emergency department workup can be falsely reassuring. If you notice any worsening, changing or persistent symptoms please call your family doctor or return to the ER immediately. Tell us how we did during your visit at [email or web address]   and let us know about your experience

## 2023-08-10 ENCOUNTER — HOSPITAL ENCOUNTER (OUTPATIENT)
Age: 48
Setting detail: SPECIMEN
Discharge: HOME | End: 2023-08-10

## 2023-08-10 DIAGNOSIS — Z11.3 ROUTINE SCREENING FOR STI (SEXUALLY TRANSMITTED INFECTION): ICD-10-CM

## 2023-08-10 LAB
CANDIDA SPECIES: NEGATIVE
GARDNERELLA VAGINALIS: NEGATIVE
HBV SURFACE AG SERPL QL IA: NONREACTIVE
HCV AB SERPL QL IA: NONREACTIVE
HIV 1+2 AB+HIV1 P24 AG SERPL QL IA: NONREACTIVE
SOURCE: NORMAL
T PALLIDUM AB SER QL IA: NONREACTIVE
TRICHOMONAS: NEGATIVE

## 2023-08-11 LAB
C TRACH DNA SPEC QL PROBE+SIG AMP: NEGATIVE
N GONORRHOEA DNA SPEC QL PROBE+SIG AMP: NEGATIVE
SPECIMEN DESCRIPTION: NORMAL

## 2023-09-19 ENCOUNTER — HOSPITAL ENCOUNTER (OUTPATIENT)
Age: 48
Setting detail: SPECIMEN
Discharge: HOME | End: 2023-09-19

## 2023-09-19 DIAGNOSIS — K44.9 HIATAL HERNIA WITH GERD: ICD-10-CM

## 2023-09-19 DIAGNOSIS — I10 ESSENTIAL HYPERTENSION, BENIGN: ICD-10-CM

## 2023-09-19 DIAGNOSIS — J45.20 MILD INTERMITTENT ASTHMA WITHOUT COMPLICATION: ICD-10-CM

## 2023-09-19 DIAGNOSIS — I89.0 LYMPHEDEMA: ICD-10-CM

## 2023-09-19 DIAGNOSIS — K21.9 HIATAL HERNIA WITH GERD: ICD-10-CM

## 2023-09-19 DIAGNOSIS — M19.90 ARTHRITIS: ICD-10-CM

## 2023-09-19 DIAGNOSIS — E66.01 MORBID OBESITY (HCC): ICD-10-CM

## 2023-09-19 DIAGNOSIS — Z98.84 S/P LAPAROSCOPIC SLEEVE GASTRECTOMY: ICD-10-CM

## 2023-09-19 DIAGNOSIS — R73.03 PREDIABETES: ICD-10-CM

## 2023-09-19 LAB
25(OH)D3 SERPL-MCNC: 55.2 NG/ML
ALBUMIN SERPL-MCNC: 4.3 G/DL (ref 3.5–5.2)
ALBUMIN/GLOB SERPL: 1.4 {RATIO} (ref 1–2.5)
ALP SERPL-CCNC: 44 U/L (ref 35–104)
ALT SERPL-CCNC: 13 U/L (ref 5–33)
ANION GAP SERPL CALCULATED.3IONS-SCNC: 11 MMOL/L (ref 9–17)
AST SERPL-CCNC: 14 U/L
BASOPHILS # BLD: <0.03 K/UL (ref 0–0.2)
BASOPHILS NFR BLD: 0 % (ref 0–2)
BILIRUB SERPL-MCNC: 0.6 MG/DL (ref 0.3–1.2)
BUN SERPL-MCNC: 20 MG/DL (ref 6–20)
CALCIUM SERPL-MCNC: 9.7 MG/DL (ref 8.6–10.4)
CHLORIDE SERPL-SCNC: 98 MMOL/L (ref 98–107)
CHOLEST SERPL-MCNC: 184 MG/DL
CHOLESTEROL/HDL RATIO: 4.1
CO2 SERPL-SCNC: 28 MMOL/L (ref 20–31)
CREAT SERPL-MCNC: 0.7 MG/DL (ref 0.5–0.9)
EOSINOPHIL # BLD: 0.1 K/UL (ref 0–0.44)
EOSINOPHILS RELATIVE PERCENT: 1 % (ref 1–4)
ERYTHROCYTE [DISTWIDTH] IN BLOOD BY AUTOMATED COUNT: 13.7 % (ref 11.8–14.4)
FERRITIN SERPL-MCNC: 247 NG/ML (ref 13–150)
FOLATE SERPL-MCNC: >20 NG/ML
GFR SERPL CREATININE-BSD FRML MDRD: >60 ML/MIN/1.73M2
GLUCOSE SERPL-MCNC: 84 MG/DL (ref 70–99)
HCT VFR BLD AUTO: 45.1 % (ref 36.3–47.1)
HDLC SERPL-MCNC: 45 MG/DL
HGB BLD-MCNC: 14.8 G/DL (ref 11.9–15.1)
IMM GRANULOCYTES # BLD AUTO: <0.03 K/UL (ref 0–0.3)
IMM GRANULOCYTES NFR BLD: 0 %
IRON SATN MFR SERPL: 33 % (ref 20–55)
IRON SERPL-MCNC: 88 UG/DL (ref 37–145)
LDLC SERPL CALC-MCNC: 111 MG/DL (ref 0–130)
LYMPHOCYTES NFR BLD: 2.68 K/UL (ref 1.1–3.7)
LYMPHOCYTES RELATIVE PERCENT: 38 % (ref 24–43)
MAGNESIUM SERPL-MCNC: 1.9 MG/DL (ref 1.6–2.6)
MCH RBC QN AUTO: 28.8 PG (ref 25.2–33.5)
MCHC RBC AUTO-ENTMCNC: 32.8 G/DL (ref 28.4–34.8)
MCV RBC AUTO: 87.7 FL (ref 82.6–102.9)
MONOCYTES NFR BLD: 0.65 K/UL (ref 0.1–1.2)
MONOCYTES NFR BLD: 9 % (ref 3–12)
NEUTROPHILS NFR BLD: 52 % (ref 36–65)
NEUTS SEG NFR BLD: 3.52 K/UL (ref 1.5–8.1)
NRBC BLD-RTO: 0 PER 100 WBC
PLATELET # BLD AUTO: 247 K/UL (ref 138–453)
PMV BLD AUTO: 10.8 FL (ref 8.1–13.5)
POTASSIUM SERPL-SCNC: 4.6 MMOL/L (ref 3.7–5.3)
PROT SERPL-MCNC: 7.4 G/DL (ref 6.4–8.3)
PTH-INTACT SERPL-MCNC: 38.4 PG/ML (ref 14–72)
RBC # BLD AUTO: 5.14 M/UL (ref 3.95–5.11)
SODIUM SERPL-SCNC: 137 MMOL/L (ref 135–144)
TIBC SERPL-MCNC: 268 UG/DL (ref 250–450)
TRIGL SERPL-MCNC: 138 MG/DL
TSH SERPL DL<=0.05 MIU/L-ACNC: 1.11 UIU/ML (ref 0.3–5)
UNSATURATED IRON BINDING CAPACITY: 180 UG/DL (ref 112–347)
VIT B12 SERPL-MCNC: >2000 PG/ML (ref 232–1245)
WBC OTHER # BLD: 7 K/UL (ref 3.5–11.3)

## 2023-09-20 ENCOUNTER — OFFICE VISIT (OUTPATIENT)
Dept: BARIATRICS/WEIGHT MGMT | Age: 48
End: 2023-09-20
Payer: COMMERCIAL

## 2023-09-20 VITALS
HEART RATE: 67 BPM | OXYGEN SATURATION: 98 % | HEIGHT: 62 IN | DIASTOLIC BLOOD PRESSURE: 82 MMHG | WEIGHT: 238 LBS | BODY MASS INDEX: 43.79 KG/M2 | SYSTOLIC BLOOD PRESSURE: 122 MMHG

## 2023-09-20 DIAGNOSIS — R73.03 PREDIABETES: ICD-10-CM

## 2023-09-20 DIAGNOSIS — I10 ESSENTIAL HYPERTENSION, BENIGN: Primary | ICD-10-CM

## 2023-09-20 DIAGNOSIS — K44.9 HIATAL HERNIA WITH GERD: ICD-10-CM

## 2023-09-20 DIAGNOSIS — K21.9 HIATAL HERNIA WITH GERD: ICD-10-CM

## 2023-09-20 DIAGNOSIS — J45.20 MILD INTERMITTENT ASTHMA WITHOUT COMPLICATION: ICD-10-CM

## 2023-09-20 DIAGNOSIS — L98.7 EXCESS SKIN OF ABDOMEN: ICD-10-CM

## 2023-09-20 DIAGNOSIS — M19.90 ARTHRITIS: ICD-10-CM

## 2023-09-20 DIAGNOSIS — E66.01 MORBID OBESITY (HCC): ICD-10-CM

## 2023-09-20 DIAGNOSIS — I89.0 LYMPHEDEMA: ICD-10-CM

## 2023-09-20 DIAGNOSIS — Z98.84 S/P LAPAROSCOPIC SLEEVE GASTRECTOMY: ICD-10-CM

## 2023-09-20 PROCEDURE — 3079F DIAST BP 80-89 MM HG: CPT | Performed by: NURSE PRACTITIONER

## 2023-09-20 PROCEDURE — 3074F SYST BP LT 130 MM HG: CPT | Performed by: NURSE PRACTITIONER

## 2023-09-20 PROCEDURE — 99213 OFFICE O/P EST LOW 20 MIN: CPT | Performed by: NURSE PRACTITIONER

## 2023-09-20 NOTE — PROGRESS NOTES
Post-op Bariatric Surgery Note    Subjective     Patient is 1 year s/p laparoscopic sleeve gastrectomy, down 140 lbs. Overall, doing well. Incisions well healed. Consistent use of MVI and calcium. Tolerating po intake. Bowel function normal.  Physical activity includes aquarobics. Off DM and BP medication. GERD controlled on Pepcid daily. Bothered by excess abdominal skin. No other specific problems or concerns. 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 Sancho Garland   .     Past Surgical History:  Past Surgical History:   Procedure Laterality Date    CARDIOVASCULAR STRESS TEST  2017    ENDOMETRIAL ABLATION      FOOT SURGERY Bilateral     ingrown toenail big toe    SLEEVE GASTRECTOMY  08/31/2022    ROBOTIC LAPAROSCOPIC GASTRECTOMY SLEEVE, EGD    SLEEVE GASTRECTOMY N/A 8/31/2022    XI ROBOTIC LAPAROSCOPIC GASTRECTOMY SLEEVE, EGD performed by Lawrence Pratt DO at [facility] N/A 03/25/2022    EGD BIOPSY performed by Lawrence Pratt DO at [facility]       Family History:  Family History   Problem Relation Age of

## 2023-09-21 LAB — ZINC SERPL-MCNC: 78.7 UG/DL (ref 60–120)

## 2023-09-22 LAB
RETINYL PALMITATE: <0.02 MG/L (ref 0–0.1)
VITAMIN A LEVEL: 0.82 MG/L (ref 0.3–1.2)
VITAMIN A, INTERP: NORMAL

## 2023-09-24 LAB — VIT B1 PYROPHOSHATE BLD-SCNC: 152 NMOL/L (ref 70–180)

## 2023-09-26 ENCOUNTER — TELEPHONE (OUTPATIENT)
Dept: SURGERY | Age: 48
End: 2023-09-26

## 2023-09-26 NOTE — TELEPHONE ENCOUNTER
9/26/23- I called pt to sched consult with dr Robie Merlin, however pt is not willing to drive to [place] so referral will be routed back to referring provider

## 2023-11-28 DIAGNOSIS — Z01.818 PRE-OP TESTING: Primary | ICD-10-CM

## 2023-11-29 ENCOUNTER — OFFICE VISIT (OUTPATIENT)
Dept: SURGERY | Age: 48
End: 2023-11-29
Payer: COMMERCIAL

## 2023-11-29 VITALS
BODY MASS INDEX: 43.79 KG/M2 | HEART RATE: 87 BPM | HEIGHT: 62 IN | RESPIRATION RATE: 16 BRPM | OXYGEN SATURATION: 100 % | WEIGHT: 238 LBS

## 2023-11-29 DIAGNOSIS — M62.08 DIASTASIS OF RECTUS ABDOMINIS: ICD-10-CM

## 2023-11-29 DIAGNOSIS — E65 SYMPTOMATIC ABDOMINAL PANNICULUS: Primary | ICD-10-CM

## 2023-11-29 PROCEDURE — 99203 OFFICE O/P NEW LOW 30 MIN: CPT | Performed by: PLASTIC SURGERY

## 2023-11-29 NOTE — PROGRESS NOTES
PANNICULECTOMY HEALTH CHECKLIST:  Height:     Weight:      BMI: There is no height or weight on file to calculate BMI. Does your pannus affect your daily activities and quality of life? Yes  X    No    How long: Which daily living activities are affected in the following ways? Cleaning of feet X   Odor X   Interferes with exercise X   Terrell X   Painful pulling of abdominal skin x   Clothes not fitting  X           Urination X   Pulling of pubic hair x   Seat belt not fitting X   Other:      Back pain? Yes    x  No        Any Treatment? Yes   chiropractic    No        Any Testing? Yes      No   Where/What:     Have you had bariatric surgery? Yes  X 8/30/2022    No        Initial weight: 378 lb        Weight stable for how long? 3 months    Do you have a hernia? Yes     No x        Testing:  CT      Date/location:   Have you had a hernia repair in the past?  Yes      No x        Was mesh used? Yes      No        Surgeon for hernia:    Any Rashes/Ulcers under folds of skin?   Yes   x     No         Medications used:                                                        OTC nystatin                                                                                               RX          Notes:
ADVISORIES    Long-Term Results- Subsequent alterations in the appearance of your body may occur as the result of aging, sun exposure, weight loss, weight gain, pregnancy, menopause or other circumstances not related to your surgery. Metabolic Status of Massive Weight Loss Patients- Your personal metabolic status of blood chemistry and protein levels may be abnormal following massive weight loss and surgical procedures to make a patient loose weight. Individuals with abnormalities may be a risk for serious medical and surgical complications, including delayed wound healing, infection or even in rare cases, death. Body-Piercing Procedures- Individuals who currently wear body-piercing jewelry or are seeking to undergo body-piercing procedures must consider the possibility that an infection could develop anytime following this procedure. Treatment including antibiotics, hospitalization or additional surgery may be necessary. Intimate Relations After Surgery- Surgery involves coagulating of blood vessels and increased activity of any kind may open these vessels leading to a bleed, or hematoma. Increased activity that increased your pulse or heart rate may cause additional bruising, swelling, and the need for return to surgery and control bleeding. It is wise to refrain from sexual activity until your physician states it is safe. Medications-  There are many adverse reactions that occur as the result of taking over-the-counter, herbal, and/or prescription medications. Be sure to check with your physician about any drug interactions that may exist with medications that you are already taking. If you have an adverse reaction, stop the drugs immediately and call your plastic surgeon for further instructions. If the reaction is severe, go immediately to the nearest emergency room.   When taking the prescribed pain medications after surgery, realize that they can affect your thought process and

## 2024-01-24 ENCOUNTER — HOSPITAL ENCOUNTER (OUTPATIENT)
Dept: ULTRASOUND IMAGING | Facility: CLINIC | Age: 49
Discharge: HOME | End: 2024-01-26
Payer: COMMERCIAL

## 2024-01-24 DIAGNOSIS — N94.9 ADNEXAL CYST: ICD-10-CM

## 2024-01-24 PROCEDURE — 76856 US EXAM PELVIC COMPLETE: CPT

## 2024-03-19 ENCOUNTER — HOSPITAL ENCOUNTER (OUTPATIENT)
Age: 49
Setting detail: SPECIMEN
Discharge: HOME | End: 2024-03-19

## 2024-03-19 DIAGNOSIS — I89.0 LYMPHEDEMA: ICD-10-CM

## 2024-03-19 DIAGNOSIS — I10 ESSENTIAL HYPERTENSION, BENIGN: ICD-10-CM

## 2024-03-19 DIAGNOSIS — K44.9 HIATAL HERNIA WITH GERD: ICD-10-CM

## 2024-03-19 DIAGNOSIS — M19.90 ARTHRITIS: ICD-10-CM

## 2024-03-19 DIAGNOSIS — J45.20 MILD INTERMITTENT ASTHMA WITHOUT COMPLICATION: ICD-10-CM

## 2024-03-19 DIAGNOSIS — R73.03 PREDIABETES: ICD-10-CM

## 2024-03-19 DIAGNOSIS — E66.01 MORBID OBESITY (HCC): ICD-10-CM

## 2024-03-19 DIAGNOSIS — Z98.84 S/P LAPAROSCOPIC SLEEVE GASTRECTOMY: ICD-10-CM

## 2024-03-19 DIAGNOSIS — K21.9 HIATAL HERNIA WITH GERD: ICD-10-CM

## 2024-03-19 LAB
25(OH)D3 SERPL-MCNC: 52.2 NG/ML (ref 30–100)
ALBUMIN SERPL-MCNC: 4.3 G/DL (ref 3.5–5.2)
ALBUMIN/GLOB SERPL: 1 {RATIO} (ref 1–2.5)
ALP SERPL-CCNC: 37 U/L (ref 35–104)
ALT SERPL-CCNC: 14 U/L (ref 10–35)
ANION GAP SERPL CALCULATED.3IONS-SCNC: 13 MMOL/L (ref 9–16)
AST SERPL-CCNC: 14 U/L (ref 10–35)
BASOPHILS # BLD: <0.03 K/UL (ref 0–0.2)
BASOPHILS NFR BLD: 0 % (ref 0–2)
BILIRUB SERPL-MCNC: 0.5 MG/DL (ref 0–1.2)
BUN SERPL-MCNC: 21 MG/DL (ref 6–20)
CALCIUM SERPL-MCNC: 9.3 MG/DL (ref 8.6–10.4)
CHLORIDE SERPL-SCNC: 101 MMOL/L (ref 98–107)
CHOLEST SERPL-MCNC: 177 MG/DL (ref 0–199)
CHOLESTEROL/HDL RATIO: 4
CO2 SERPL-SCNC: 26 MMOL/L (ref 20–31)
CREAT SERPL-MCNC: 0.7 MG/DL (ref 0.5–0.9)
EOSINOPHIL # BLD: 0.1 K/UL (ref 0–0.44)
EOSINOPHILS RELATIVE PERCENT: 2 % (ref 1–4)
ERYTHROCYTE [DISTWIDTH] IN BLOOD BY AUTOMATED COUNT: 13.4 % (ref 11.8–14.4)
EST. AVERAGE GLUCOSE BLD GHB EST-MCNC: 80 MG/DL
FERRITIN SERPL-MCNC: 205 NG/ML (ref 13–150)
FOLATE SERPL-MCNC: >20 NG/ML (ref 4.8–24.2)
GFR SERPL CREATININE-BSD FRML MDRD: >60 ML/MIN/1.73M2
GLUCOSE SERPL-MCNC: 84 MG/DL (ref 74–99)
HBA1C MFR BLD: 4.4 % (ref 4–6)
HCT VFR BLD AUTO: 44.2 % (ref 36.3–47.1)
HDLC SERPL-MCNC: 49 MG/DL
HGB BLD-MCNC: 14.5 G/DL (ref 11.9–15.1)
IMM GRANULOCYTES # BLD AUTO: 0.03 K/UL (ref 0–0.3)
IMM GRANULOCYTES NFR BLD: 0 %
IRON SATN MFR SERPL: 27 % (ref 20–55)
IRON SERPL-MCNC: 81 UG/DL (ref 37–145)
LDLC SERPL CALC-MCNC: 98 MG/DL (ref 0–100)
LYMPHOCYTES NFR BLD: 3.03 K/UL (ref 1.1–3.7)
LYMPHOCYTES RELATIVE PERCENT: 45 % (ref 24–43)
MAGNESIUM SERPL-MCNC: 1.8 MG/DL (ref 1.6–2.6)
MCH RBC QN AUTO: 28.7 PG (ref 25.2–33.5)
MCHC RBC AUTO-ENTMCNC: 32.8 G/DL (ref 28.4–34.8)
MCV RBC AUTO: 87.5 FL (ref 82.6–102.9)
MONOCYTES NFR BLD: 0.74 K/UL (ref 0.1–1.2)
MONOCYTES NFR BLD: 11 % (ref 3–12)
NEUTROPHILS NFR BLD: 42 % (ref 36–65)
NEUTS SEG NFR BLD: 2.86 K/UL (ref 1.5–8.1)
NRBC BLD-RTO: 0 PER 100 WBC
PLATELET # BLD AUTO: 210 K/UL (ref 138–453)
PMV BLD AUTO: 10.2 FL (ref 8.1–13.5)
POTASSIUM SERPL-SCNC: 4.1 MMOL/L (ref 3.7–5.3)
PROT SERPL-MCNC: 7.4 G/DL (ref 6.6–8.7)
PTH-INTACT SERPL-MCNC: 40 PG/ML (ref 15–65)
RBC # BLD AUTO: 5.05 M/UL (ref 3.95–5.11)
SODIUM SERPL-SCNC: 140 MMOL/L (ref 136–145)
T4 FREE SERPL-MCNC: 1.3 NG/DL (ref 0.93–1.7)
TIBC SERPL-MCNC: 302 UG/DL (ref 250–450)
TRIGL SERPL-MCNC: 148 MG/DL
TSH SERPL DL<=0.05 MIU/L-ACNC: 1.25 UIU/ML (ref 0.27–4.2)
UNSATURATED IRON BINDING CAPACITY: 221 UG/DL (ref 112–347)
VIT B12 SERPL-MCNC: 1633 PG/ML (ref 232–1245)
VLDLC SERPL CALC-MCNC: 30 MG/DL
WBC OTHER # BLD: 6.8 K/UL (ref 3.5–11.3)

## 2024-03-20 ENCOUNTER — OFFICE VISIT (OUTPATIENT)
Dept: BARIATRICS/WEIGHT MGMT | Age: 49
End: 2024-03-20
Payer: COMMERCIAL

## 2024-03-20 VITALS
SYSTOLIC BLOOD PRESSURE: 110 MMHG | HEART RATE: 65 BPM | WEIGHT: 249 LBS | OXYGEN SATURATION: 98 % | BODY MASS INDEX: 45.82 KG/M2 | HEIGHT: 62 IN | DIASTOLIC BLOOD PRESSURE: 78 MMHG

## 2024-03-20 DIAGNOSIS — I89.0 LYMPHEDEMA: ICD-10-CM

## 2024-03-20 DIAGNOSIS — K44.9 HIATAL HERNIA WITH GERD: ICD-10-CM

## 2024-03-20 DIAGNOSIS — J45.20 MILD INTERMITTENT ASTHMA WITHOUT COMPLICATION: ICD-10-CM

## 2024-03-20 DIAGNOSIS — Z98.84 S/P LAPAROSCOPIC SLEEVE GASTRECTOMY: ICD-10-CM

## 2024-03-20 DIAGNOSIS — R73.03 PREDIABETES: ICD-10-CM

## 2024-03-20 DIAGNOSIS — E66.01 MORBID OBESITY (HCC): ICD-10-CM

## 2024-03-20 DIAGNOSIS — K21.9 HIATAL HERNIA WITH GERD: ICD-10-CM

## 2024-03-20 DIAGNOSIS — I10 ESSENTIAL HYPERTENSION, BENIGN: Primary | ICD-10-CM

## 2024-03-20 PROCEDURE — 99213 OFFICE O/P EST LOW 20 MIN: CPT | Performed by: NURSE PRACTITIONER

## 2024-03-20 PROCEDURE — 3074F SYST BP LT 130 MM HG: CPT | Performed by: NURSE PRACTITIONER

## 2024-03-20 PROCEDURE — 3078F DIAST BP <80 MM HG: CPT | Performed by: NURSE PRACTITIONER

## 2024-03-20 NOTE — PROGRESS NOTES
Medical Nutrition Therapy for Metabolic and Bariatric Surgery  18 Month Post-Op Gastric Sleeve Nutrition Follow-Up      Nutrition Assessment:  Patient reports:  tolerating diet, sometimes eating small, frequent meals.  sometimes eating protein first,  sometimes eating protein portions with all meals and snacks.  Drinking at least 50-55 oz of fluid and sugar free beverages daily. Patient reports sips of soda once in a while.   consistently taking vitamins and minerals.   not adding physical activity to daily life to remain active.   Goals discussed to work on until next visit:  Continue bariatric behaviors.    All questions answered. Patient aware of how to contact RD if needs arise. RD to remain available.    24-hr diet recall scanned into chart.    Multivitamin/Mineral Intake: Yes, bariatric     Calcium Intake: Yes, 1000 mg    Vitals:   Vitals:    03/20/24 1348   BP: 110/78   Site: Left Upper Arm   Position: Sitting   Cuff Size: Large Adult   Pulse: 65   SpO2: 98%   Weight: 112.9 kg (249 lb)   Height: 1.575 m (5' 2\")        Body mass index is 45.54 kg/m².    Nutrition Diagnosis:   Inadequate food and beverage intake r/t WLS as evidenced by loss of excess body weight lost 129 lbs over 18 Months.    Nutrition Intervention:  Diet: Bariatric Diet, 60-80gm of protein, and 48-64oz of fluid    Nutrition Education: Bariatric Binder (diet guidelines and recipes)    Goal:   Continue bariatric diet and continue to add variety to diet as tolerated.   Sip on water or sugar-free fluid throughout the day. Aiming for a minimum of 64 oz per day.   Eat small, frequent meals containing protein, as tolerated.   Consistently take MVIs and minerals to prevent nutrient deficiencies.   Discuss activity with physician and determine a plan for remaining active.    Monitor/Evaluate:  Diet tolerance, protein intake, vitamin adherence, fluid intake, frequency of meals/snacks, activity, and follow-up at next post-op appointment    [place]

## 2024-03-20 NOTE — PROGRESS NOTES
Post-op Bariatric Surgery Note    Subjective     Patient is 18 months s/p laparoscopic sleeve gastrectomy, down 129 lbs.  Overall, doing well.  Consistent use of MVI and calcium.  Tolerating po intake.  Bowel function normal.  Physical activity includes walking.  Off DM and BP medication.  GERD controlled on Pepcid daily.  No other specific problems or concerns.      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 WILLIAM Alicea   .    Past Surgical History:  Past Surgical History:   Procedure Laterality Date    CARDIOVASCULAR STRESS TEST  2017    ENDOMETRIAL ABLATION      FOOT SURGERY Bilateral     ingrown toenail big toe    SLEEVE GASTRECTOMY  08/31/2022    ROBOTIC LAPAROSCOPIC GASTRECTOMY SLEEVE, EGD    SLEEVE GASTRECTOMY N/A 8/31/2022    XI ROBOTIC LAPAROSCOPIC GASTRECTOMY SLEEVE, EGD performed by Larry Lomax DO at [facility] OR    TONSILLECTOMY      UPPER GASTROINTESTINAL ENDOSCOPY N/A 03/25/2022    EGD BIOPSY performed by Larry Lomax DO at [facility]       Family History:  Family History   Problem Relation Age of Onset    High Blood Pressure Mother     Lung Cancer Mother

## 2024-03-21 LAB — ZINC SERPL-MCNC: 76.4 UG/DL (ref 60–120)

## 2024-03-22 LAB
RETINYL PALMITATE: <0.02 MG/L (ref 0–0.1)
VITAMIN A LEVEL: 0.8 MG/L (ref 0.3–1.2)
VITAMIN A, INTERP: NORMAL

## 2024-03-24 LAB — VIT B1 PYROPHOSHATE BLD-SCNC: 140 NMOL/L (ref 70–180)

## 2024-09-16 ENCOUNTER — HOSPITAL ENCOUNTER (OUTPATIENT)
Age: 49
Setting detail: SPECIMEN
Discharge: HOME | End: 2024-09-16

## 2024-09-16 DIAGNOSIS — K44.9 HIATAL HERNIA WITH GERD: ICD-10-CM

## 2024-09-16 DIAGNOSIS — Z98.84 S/P LAPAROSCOPIC SLEEVE GASTRECTOMY: ICD-10-CM

## 2024-09-16 DIAGNOSIS — I89.0 LYMPHEDEMA: ICD-10-CM

## 2024-09-16 DIAGNOSIS — K21.9 HIATAL HERNIA WITH GERD: ICD-10-CM

## 2024-09-16 DIAGNOSIS — I10 ESSENTIAL HYPERTENSION, BENIGN: ICD-10-CM

## 2024-09-16 DIAGNOSIS — J45.20 MILD INTERMITTENT ASTHMA WITHOUT COMPLICATION: ICD-10-CM

## 2024-09-16 DIAGNOSIS — R73.03 PREDIABETES: ICD-10-CM

## 2024-09-16 DIAGNOSIS — E66.01 MORBID OBESITY (HCC): ICD-10-CM

## 2024-09-16 LAB
25(OH)D3 SERPL-MCNC: 53.2 NG/ML (ref 30–100)
ALBUMIN SERPL-MCNC: 4.7 G/DL (ref 3.5–5.2)
ALBUMIN/GLOB SERPL: 1 {RATIO} (ref 1–2.5)
ALP SERPL-CCNC: 51 U/L (ref 35–104)
ALT SERPL-CCNC: 13 U/L (ref 10–35)
ANION GAP SERPL CALCULATED.3IONS-SCNC: 11 MMOL/L (ref 9–16)
AST SERPL-CCNC: 15 U/L (ref 10–35)
BASOPHILS # BLD: <0.03 K/UL (ref 0–0.2)
BASOPHILS NFR BLD: 0 % (ref 0–2)
BILIRUB SERPL-MCNC: 0.7 MG/DL (ref 0–1.2)
BUN SERPL-MCNC: 22 MG/DL (ref 6–20)
CALCIUM SERPL-MCNC: 10.1 MG/DL (ref 8.6–10.4)
CHLORIDE SERPL-SCNC: 100 MMOL/L (ref 98–107)
CHOLEST SERPL-MCNC: 195 MG/DL (ref 0–199)
CHOLESTEROL/HDL RATIO: 4
CO2 SERPL-SCNC: 29 MMOL/L (ref 20–31)
CREAT SERPL-MCNC: 0.9 MG/DL (ref 0.5–0.9)
EOSINOPHIL # BLD: 0.09 K/UL (ref 0–0.44)
EOSINOPHILS RELATIVE PERCENT: 1 % (ref 1–4)
ERYTHROCYTE [DISTWIDTH] IN BLOOD BY AUTOMATED COUNT: 12.9 % (ref 11.8–14.4)
EST. AVERAGE GLUCOSE BLD GHB EST-MCNC: 88 MG/DL
FERRITIN SERPL-MCNC: 248 NG/ML (ref 13–150)
FOLATE SERPL-MCNC: >20 NG/ML (ref 4.8–24.2)
GFR, ESTIMATED: 78 ML/MIN/1.73M2
GLUCOSE SERPL-MCNC: 93 MG/DL (ref 74–99)
HBA1C MFR BLD: 4.7 % (ref 4–6)
HCT VFR BLD AUTO: 45.6 % (ref 36.3–47.1)
HDLC SERPL-MCNC: 49 MG/DL
HGB BLD-MCNC: 15.3 G/DL (ref 11.9–15.1)
IMM GRANULOCYTES # BLD AUTO: <0.03 K/UL (ref 0–0.3)
IMM GRANULOCYTES NFR BLD: 0 %
IRON SATN MFR SERPL: 28 % (ref 20–55)
IRON SERPL-MCNC: 87 UG/DL (ref 37–145)
LDLC SERPL CALC-MCNC: 121 MG/DL (ref 0–100)
LYMPHOCYTES NFR BLD: 2.74 K/UL (ref 1.1–3.7)
LYMPHOCYTES RELATIVE PERCENT: 36 % (ref 24–43)
MAGNESIUM SERPL-MCNC: 1.9 MG/DL (ref 1.6–2.6)
MCH RBC QN AUTO: 28.4 PG (ref 25.2–33.5)
MCHC RBC AUTO-ENTMCNC: 33.6 G/DL (ref 28.4–34.8)
MCV RBC AUTO: 84.6 FL (ref 82.6–102.9)
MONOCYTES NFR BLD: 0.81 K/UL (ref 0.1–1.2)
MONOCYTES NFR BLD: 11 % (ref 3–12)
NEUTROPHILS NFR BLD: 52 % (ref 36–65)
NEUTS SEG NFR BLD: 3.99 K/UL (ref 1.5–8.1)
NRBC BLD-RTO: 0 PER 100 WBC
PLATELET # BLD AUTO: 229 K/UL (ref 138–453)
PMV BLD AUTO: 10.5 FL (ref 8.1–13.5)
POTASSIUM SERPL-SCNC: 4.2 MMOL/L (ref 3.7–5.3)
PROT SERPL-MCNC: 7.9 G/DL (ref 6.6–8.7)
PTH-INTACT SERPL-MCNC: 30 PG/ML (ref 15–65)
RBC # BLD AUTO: 5.39 M/UL (ref 3.95–5.11)
SODIUM SERPL-SCNC: 140 MMOL/L (ref 136–145)
T4 FREE SERPL-MCNC: 1.3 NG/DL (ref 0.92–1.68)
TIBC SERPL-MCNC: 309 UG/DL (ref 250–450)
TRIGL SERPL-MCNC: 125 MG/DL
TSH SERPL DL<=0.05 MIU/L-ACNC: 1.14 UIU/ML (ref 0.27–4.2)
UNSATURATED IRON BINDING CAPACITY: 222 UG/DL (ref 112–347)
VIT B12 SERPL-MCNC: 1948 PG/ML (ref 232–1245)
VLDLC SERPL CALC-MCNC: 25 MG/DL
WBC OTHER # BLD: 7.7 K/UL (ref 3.5–11.3)

## 2024-09-18 ENCOUNTER — OFFICE VISIT (OUTPATIENT)
Dept: BARIATRICS/WEIGHT MGMT | Age: 49
End: 2024-09-18
Payer: COMMERCIAL

## 2024-09-18 VITALS
HEIGHT: 62 IN | HEART RATE: 74 BPM | DIASTOLIC BLOOD PRESSURE: 88 MMHG | WEIGHT: 236 LBS | BODY MASS INDEX: 43.43 KG/M2 | SYSTOLIC BLOOD PRESSURE: 126 MMHG

## 2024-09-18 DIAGNOSIS — I89.0 LYMPHEDEMA: ICD-10-CM

## 2024-09-18 DIAGNOSIS — Z98.84 S/P LAPAROSCOPIC SLEEVE GASTRECTOMY: ICD-10-CM

## 2024-09-18 DIAGNOSIS — M19.90 ARTHRITIS: ICD-10-CM

## 2024-09-18 DIAGNOSIS — E66.01 MORBID OBESITY (HCC): ICD-10-CM

## 2024-09-18 DIAGNOSIS — J45.20 MILD INTERMITTENT ASTHMA WITHOUT COMPLICATION: ICD-10-CM

## 2024-09-18 DIAGNOSIS — I10 ESSENTIAL HYPERTENSION, BENIGN: Primary | ICD-10-CM

## 2024-09-18 DIAGNOSIS — R73.03 PREDIABETES: ICD-10-CM

## 2024-09-18 LAB — ZINC SERPL-MCNC: 84.2 UG/DL (ref 60–120)

## 2024-09-18 PROCEDURE — 3079F DIAST BP 80-89 MM HG: CPT | Performed by: NURSE PRACTITIONER

## 2024-09-18 PROCEDURE — 3074F SYST BP LT 130 MM HG: CPT | Performed by: NURSE PRACTITIONER

## 2024-09-18 PROCEDURE — 99213 OFFICE O/P EST LOW 20 MIN: CPT | Performed by: NURSE PRACTITIONER

## 2024-09-19 LAB
RETINYL PALMITATE: <0.02 MG/L (ref 0–0.1)
VITAMIN A LEVEL: 0.75 MG/L (ref 0.3–1.2)
VITAMIN A, INTERP: NORMAL

## 2024-09-21 LAB — VIT B1 PYROPHOSHATE BLD-SCNC: 133 NMOL/L (ref 70–180)

## 2024-10-14 ENCOUNTER — HOSPITAL ENCOUNTER (OUTPATIENT)
Dept: GENERAL RADIOLOGY | Facility: CLINIC | Age: 49
Discharge: HOME | End: 2024-10-16
Payer: COMMERCIAL

## 2024-10-14 DIAGNOSIS — M54.2 CERVICALGIA: ICD-10-CM

## 2024-10-14 PROCEDURE — 72050 X-RAY EXAM NECK SPINE 4/5VWS: CPT

## 2025-06-29 ENCOUNTER — APPOINTMENT (OUTPATIENT)
Dept: GENERAL RADIOLOGY | Facility: CLINIC | Age: 50
End: 2025-06-29
Payer: COMMERCIAL

## 2025-06-29 ENCOUNTER — HOSPITAL ENCOUNTER (EMERGENCY)
Facility: CLINIC | Age: 50
Discharge: HOME | End: 2025-06-29
Attending: EMERGENCY MEDICINE
Payer: COMMERCIAL

## 2025-06-29 VITALS
WEIGHT: 238 LBS | TEMPERATURE: 98.1 F | HEART RATE: 69 BPM | OXYGEN SATURATION: 95 % | RESPIRATION RATE: 16 BRPM | BODY MASS INDEX: 43.79 KG/M2 | SYSTOLIC BLOOD PRESSURE: 151 MMHG | HEIGHT: 62 IN | DIASTOLIC BLOOD PRESSURE: 76 MMHG

## 2025-06-29 DIAGNOSIS — S93.401A SPRAIN OF RIGHT ANKLE, UNSPECIFIED LIGAMENT, INITIAL ENCOUNTER: Primary | ICD-10-CM

## 2025-06-29 PROCEDURE — 73610 X-RAY EXAM OF ANKLE: CPT

## 2025-06-29 PROCEDURE — 99283 EMERGENCY DEPT VISIT LOW MDM: CPT

## 2025-06-29 ASSESSMENT — PAIN SCALES - GENERAL: PAINLEVEL_OUTOF10: 5

## 2025-06-29 ASSESSMENT — PAIN - FUNCTIONAL ASSESSMENT: PAIN_FUNCTIONAL_ASSESSMENT: 0-10

## 2025-06-29 ASSESSMENT — PAIN DESCRIPTION - ORIENTATION: ORIENTATION: RIGHT

## 2025-06-29 ASSESSMENT — PAIN DESCRIPTION - LOCATION: LOCATION: ANKLE

## 2025-06-29 NOTE — ED PROVIDER NOTES
[facility] DEPARTMENT  eMERGENCY dEPARTMENT eNCOUnter   Independent Attestation     Pt Name: Moni Abrams  MRN: [id number]  Birthdate 1975  Date of evaluation: 6/29/25       Moni Abrams is a 49 y.o. female who presents with Ankle Injury (Right ankle pain after rolling it pta. )        Based on the medical record, the care appears appropriate. I was personally available for consultation in the Emergency Department.    Melba Marcelo DO  Attending Emergency  Physician               Melba Marcelo DO  06/29/25 1924    
      DIAGNOSTIC RESULTS   RADIOLOGY:   Radiology images were visualized by myself.  The Radiologist interpretations were reviewed and are as follows:     XR ANKLE RIGHT (MIN 3 VIEWS) (Final result)  Result time 06/29/25 18:39:31  Final result by Young Pavon MD (06/29/25 18:39:31)                Impression:    Soft tissue swelling at the level of the ankle with no acute osseous  abnormality.            Narrative:    EXAMINATION:  THREE XRAY VIEWS OF THE RIGHT ANKLE    6/29/2025 5:54 pm    COMPARISON:  None.    HISTORY:  ORDERING SYSTEM PROVIDED HISTORY: rolled right ankle  TECHNOLOGIST PROVIDED HISTORY:  rolled right ankle  Reason for Exam: Rolled right ankle - right ankle pain & swelling    FINDINGS:  Mild soft tissue swelling of the right ankle.  No acute fracture or  dislocation.                  LABS:  No results found for this visit on 06/29/25.    MDM:   Patient presents to the ER with an acute injury to the right ankle.  Patient states that she rolled her ankle when moving things to recycle bin.  Patient states that this injury occurred approximately an hour and a half prior to arrival.  She has noted swelling and pain to the ankle.  On exam, there is no gross deformity, she does have diffuse lateral tenderness to the right ankle.  No proximal fibular tenderness.  I will obtain a x-ray of the right ankle to evaluate for acute pathology.  Patient has declined my offer for pain medication in the ER.  She will place ice on the right ankle.    EMERGENCY DEPARTMENT COURSE:   Vitals:    Vitals:    06/29/25 1719   BP: (!) 151/76   Pulse: 69   Resp: 16   Temp: 98.1 °F (36.7 °C)   TempSrc: Oral   SpO2: 95%   Weight: 108 kg (238 lb)   Height: 1.575 m (5' 2\")     -------------------------  BP: (!) 151/76, Temp: 98.1 °F (36.7 °C), Pulse: 69, Respirations: 16    The patient was given the following medications:  No orders of the defined types were placed in this encounter.     Re-evaluation Notes  6:52 p.m. Results

## 2025-06-29 NOTE — DISCHARGE INSTR - COC
Continuity of Care Form    Patient Name: Moni Abrams   :  1975  MRN:  [id number]    Admit date:  2025  Discharge date:  ***    Code Status Order: Prior   Advance Directives:     Admitting Physician:  No admitting provider for patient encounter.  PCP: Heather Alicea DO    Discharging Nurse: ***  Discharging Hospital Unit/Room#: ER07/ER07  Discharging Unit Phone Number: ***    Emergency Contact:   Extended Emergency Contact Information  Primary Emergency Contact: Ivan Goldberg   [facility]  Home Phone: [phone]  Mobile Phone: [phone]  Relation: Child    Past Surgical History:  Past Surgical History:   Procedure Laterality Date    CARDIOVASCULAR STRESS TEST  2017    ENDOMETRIAL ABLATION      FOOT SURGERY Bilateral     ingrown toenail big toe    SLEEVE GASTRECTOMY  2022    ROBOTIC LAPAROSCOPIC GASTRECTOMY SLEEVE, EGD    SLEEVE GASTRECTOMY N/A 2022    XI ROBOTIC LAPAROSCOPIC GASTRECTOMY SLEEVE, EGD performed by Larry Lomax DO at [facility] OR    TONSILLECTOMY      UPPER GASTROINTESTINAL ENDOSCOPY N/A 2022    EGD BIOPSY performed by Larry Lomax DO at [facility] End[facility]       Immunization History:   Immunization History   Administered Date(s) Administered    COVID-19, PFIZER PURPLE top, DILUTE for use, (age 12 y+), 30mcg/0.3mL 2021, 2021    Influenza Virus Vaccine 09/15/2014, 10/05/2015    Influenza, FLUARIX, FLULAVAL, FLUZONE (age 6 mo+) and AFLURIA, (age 3 y+), Quadv PF, 0.5mL 10/22/2018, 10/24/2019, 2023, 01/15/2024    Influenza, FLUARIX, FLULAVAL, FLUZONE, (age 6 mo+), AFLURIA, (age 3 y+), IM, Trivalent PF, 0.5mL 2024    Pneumococcal, PCV20, PREVNAR 20, (age 6w+), IM, 0.5mL 01/15/2024    TDaP, ADACEL (age 10y-64y), BOOSTRIX (age 10y+), IM, 0.5mL 2013, 2024       Active Problems:  Patient Active Problem List   Diagnosis Code    Essential hypertension, benign I10    Allergic rhinitis J30.9    Hiatal hernia with GERD

## (undated) DEVICE — REDUCER: Brand: ENDOWRIST

## (undated) DEVICE — SOLUTION ANTIFOG VIS SYS CLEARIFY LAPSCP

## (undated) DEVICE — NEEDLE SPNL 18GA L3.5IN W/ QNCKE SHARPER BVL DURA CLICK

## (undated) DEVICE — STAPLER 60 RELOAD GREEN: Brand: SUREFORM

## (undated) DEVICE — COVER LT HNDL BLU PLAS

## (undated) DEVICE — SUTURE MCRYL SZ 4-0 L18IN ABSRB UD L16MM PC-3 3/8 CIR PRIM Y845G

## (undated) DEVICE — GLOVE SURG SZ 65 THK91MIL LTX FREE SYN POLYISOPRENE

## (undated) DEVICE — FORCEPS BX L240CM WRK CHN 2.8MM STD CAP W/ NDL MIC MESH

## (undated) DEVICE — APPLICATOR MEDICATED 26 CC SOLUTION HI LT ORNG CHLORAPREP

## (undated) DEVICE — TOWEL,OR,DSP,ST,NATURAL,DLX,4/PK,20PK/CS: Brand: MEDLINE

## (undated) DEVICE — Device

## (undated) DEVICE — ELECTRO LUBE IS A SINGLE PATIENT USE DEVICE THAT IS INTENDED TO BE USED ON ELECTROSURGICAL ELECTRODES TO REDUCE STICKING.: Brand: KEY SURGICAL ELECTRO LUBE

## (undated) DEVICE — TROCAR: Brand: KII FIOS FIRST ENTRY

## (undated) DEVICE — VISIGI 3D®  CALIBRATION SYSTEM  SIZE 36FR STD W/ BULB: Brand: BOEHRINGER® VISIGI 3D™ SLEEVE GASTRECTOMY CALIBRATION SYSTEM, SIZE 36FR W/BULB

## (undated) DEVICE — BINDER ABD XL W15IN 62 74IN CIRC UNISX 5 PNL E CNTCT CLSR

## (undated) DEVICE — HYPODERMIC SAFETY NEEDLE: Brand: MAGELLAN

## (undated) DEVICE — ADHESIVE SKIN CLOSURE TOP 36 CC HI VISC DERMBND MINI

## (undated) DEVICE — BLADELESS OBTURATOR: Brand: WECK VISTA

## (undated) DEVICE — GLOVE ORANGE PI 7   MSG9070

## (undated) DEVICE — STAPLER 60 RELOAD BLUE: Brand: SUREFORM

## (undated) DEVICE — SEAL

## (undated) DEVICE — SUTURE SZ 0 27IN 5/8 CIR UR-6  TAPER PT VIOLET ABSRB VICRYL J603H

## (undated) DEVICE — GLOVE ORANGE PI 8   MSG9080

## (undated) DEVICE — STAPLER 60: Brand: SUREFORM

## (undated) DEVICE — PAD,NON-ADHERENT,3X8,STERILE,LF,1/PK: Brand: MEDLINE

## (undated) DEVICE — CANNULA SEAL

## (undated) DEVICE — VESSEL SEALER EXTEND: Brand: ENDOWRIST

## (undated) DEVICE — INSUFFLATION TUBING SET WITH FILTER, FUNNEL CONNECTOR AND LUER LOCK: Brand: JOSNOE MEDICAL INC

## (undated) DEVICE — SYRINGE, LUER LOCK, 30ML: Brand: MEDLINE

## (undated) DEVICE — ARM DRAPE

## (undated) DEVICE — SUTURE VIC + SH-13-0 27IN  VCP311H